# Patient Record
Sex: MALE | Race: WHITE | Employment: OTHER | URBAN - NONMETROPOLITAN AREA
[De-identification: names, ages, dates, MRNs, and addresses within clinical notes are randomized per-mention and may not be internally consistent; named-entity substitution may affect disease eponyms.]

---

## 2017-01-31 DIAGNOSIS — E10.9 TYPE 1 DIABETES, HBA1C GOAL < 7% (H): Primary | ICD-10-CM

## 2017-02-01 RX ORDER — INSULIN ASPART 100 [IU]/ML
INJECTION, SOLUTION INTRAVENOUS; SUBCUTANEOUS
Qty: 9 ML | Refills: 0 | Status: SHIPPED | OUTPATIENT
Start: 2017-02-01 | End: 2017-07-24

## 2017-02-28 DIAGNOSIS — M79.89 SWELLING OF LIMB: ICD-10-CM

## 2017-02-28 DIAGNOSIS — E78.00 HYPERCHOLESTEROLEMIA: ICD-10-CM

## 2017-02-28 RX ORDER — SIMVASTATIN 10 MG
TABLET ORAL
Qty: 90 TABLET | Refills: 1 | Status: SHIPPED | OUTPATIENT
Start: 2017-02-28 | End: 2017-09-19

## 2017-02-28 RX ORDER — CELECOXIB 200 MG/1
CAPSULE ORAL
Qty: 180 CAPSULE | Refills: 1 | Status: SHIPPED | OUTPATIENT
Start: 2017-02-28 | End: 2017-03-20

## 2017-03-04 ENCOUNTER — TRANSFERRED RECORDS (OUTPATIENT)
Dept: HEALTH INFORMATION MANAGEMENT | Facility: HOSPITAL | Age: 82
End: 2017-03-04

## 2017-03-04 ENCOUNTER — HOSPITAL ENCOUNTER (EMERGENCY)
Facility: HOSPITAL | Age: 82
Discharge: ANOTHER HOSPITAL | End: 2017-03-04
Attending: PHYSICIAN ASSISTANT | Admitting: PHYSICIAN ASSISTANT
Payer: MEDICARE

## 2017-03-04 VITALS
HEART RATE: 95 BPM | DIASTOLIC BLOOD PRESSURE: 62 MMHG | SYSTOLIC BLOOD PRESSURE: 98 MMHG | OXYGEN SATURATION: 96 % | RESPIRATION RATE: 20 BRPM | TEMPERATURE: 98 F

## 2017-03-04 DIAGNOSIS — K92.2 GASTROINTESTINAL HEMORRHAGE, UNSPECIFIED GASTROINTESTINAL HEMORRHAGE TYPE: ICD-10-CM

## 2017-03-04 LAB
ABO + RH BLD: NORMAL
ABO + RH BLD: NORMAL
ANION GAP SERPL CALCULATED.3IONS-SCNC: 10 MMOL/L (ref 3–14)
APTT PPP: 24 SEC (ref 24–37)
BASOPHILS # BLD AUTO: 0 10E9/L (ref 0–0.2)
BASOPHILS NFR BLD AUTO: 0.4 %
BLD GP AB SCN SERPL QL: NORMAL
BLOOD BANK CMNT PATIENT-IMP: NORMAL
BUN SERPL-MCNC: 36 MG/DL (ref 7–30)
CALCIUM SERPL-MCNC: 8.8 MG/DL (ref 8.5–10.1)
CHLORIDE SERPL-SCNC: 106 MMOL/L (ref 94–109)
CO2 SERPL-SCNC: 27 MMOL/L (ref 20–32)
CREAT SERPL-MCNC: 1.72 MG/DL (ref 0.66–1.25)
DIFFERENTIAL METHOD BLD: ABNORMAL
EOSINOPHIL # BLD AUTO: 0.1 10E9/L (ref 0–0.7)
EOSINOPHIL NFR BLD AUTO: 1.8 %
ERYTHROCYTE [DISTWIDTH] IN BLOOD BY AUTOMATED COUNT: 13.5 % (ref 10–15)
GFR SERPL CREATININE-BSD FRML MDRD: 38 ML/MIN/1.7M2
GLUCOSE SERPL-MCNC: 180 MG/DL (ref 70–99)
HCT VFR BLD AUTO: 33.6 % (ref 40–53)
HGB BLD-MCNC: 11 G/DL (ref 13.3–17.7)
IMM GRANULOCYTES # BLD: 0.1 10E9/L (ref 0–0.4)
IMM GRANULOCYTES NFR BLD: 0.8 %
INR PPP: 1.12 (ref 0.8–1.2)
LYMPHOCYTES # BLD AUTO: 1.1 10E9/L (ref 0.8–5.3)
LYMPHOCYTES NFR BLD AUTO: 15.5 %
MCH RBC QN AUTO: 31.6 PG (ref 26.5–33)
MCHC RBC AUTO-ENTMCNC: 32.7 G/DL (ref 31.5–36.5)
MCV RBC AUTO: 97 FL (ref 78–100)
MONOCYTES # BLD AUTO: 0.6 10E9/L (ref 0–1.3)
MONOCYTES NFR BLD AUTO: 8.4 %
NEUTROPHILS # BLD AUTO: 5.3 10E9/L (ref 1.6–8.3)
NEUTROPHILS NFR BLD AUTO: 73.1 %
NRBC # BLD AUTO: 0 10*3/UL
NRBC BLD AUTO-RTO: 0 /100
PLATELET # BLD AUTO: 145 10E9/L (ref 150–450)
POTASSIUM SERPL-SCNC: 4.2 MMOL/L (ref 3.4–5.3)
RBC # BLD AUTO: 3.48 10E12/L (ref 4.4–5.9)
SODIUM SERPL-SCNC: 143 MMOL/L (ref 133–144)
SPECIMEN EXP DATE BLD: NORMAL
WBC # BLD AUTO: 7.2 10E9/L (ref 4–11)

## 2017-03-04 PROCEDURE — 86850 RBC ANTIBODY SCREEN: CPT | Performed by: PHYSICIAN ASSISTANT

## 2017-03-04 PROCEDURE — 86901 BLOOD TYPING SEROLOGIC RH(D): CPT | Performed by: PHYSICIAN ASSISTANT

## 2017-03-04 PROCEDURE — 80048 BASIC METABOLIC PNL TOTAL CA: CPT | Performed by: PHYSICIAN ASSISTANT

## 2017-03-04 PROCEDURE — 86900 BLOOD TYPING SEROLOGIC ABO: CPT | Performed by: PHYSICIAN ASSISTANT

## 2017-03-04 PROCEDURE — 93005 ELECTROCARDIOGRAM TRACING: CPT

## 2017-03-04 PROCEDURE — 25000128 H RX IP 250 OP 636: Performed by: PHYSICIAN ASSISTANT

## 2017-03-04 PROCEDURE — 99284 EMERGENCY DEPT VISIT MOD MDM: CPT | Performed by: PHYSICIAN ASSISTANT

## 2017-03-04 PROCEDURE — 99285 EMERGENCY DEPT VISIT HI MDM: CPT | Mod: 25

## 2017-03-04 PROCEDURE — 93010 ELECTROCARDIOGRAM REPORT: CPT | Performed by: INTERNAL MEDICINE

## 2017-03-04 PROCEDURE — 85730 THROMBOPLASTIN TIME PARTIAL: CPT | Performed by: PHYSICIAN ASSISTANT

## 2017-03-04 PROCEDURE — 96361 HYDRATE IV INFUSION ADD-ON: CPT

## 2017-03-04 PROCEDURE — 85025 COMPLETE CBC W/AUTO DIFF WBC: CPT | Performed by: PHYSICIAN ASSISTANT

## 2017-03-04 PROCEDURE — 96374 THER/PROPH/DIAG INJ IV PUSH: CPT

## 2017-03-04 PROCEDURE — 36415 COLL VENOUS BLD VENIPUNCTURE: CPT | Performed by: PHYSICIAN ASSISTANT

## 2017-03-04 PROCEDURE — 85610 PROTHROMBIN TIME: CPT | Performed by: PHYSICIAN ASSISTANT

## 2017-03-04 PROCEDURE — 25000125 ZZHC RX 250: Performed by: PHYSICIAN ASSISTANT

## 2017-03-04 RX ORDER — SODIUM CHLORIDE 9 MG/ML
INJECTION, SOLUTION INTRAVENOUS CONTINUOUS
Status: DISCONTINUED | OUTPATIENT
Start: 2017-03-04 | End: 2017-03-04 | Stop reason: HOSPADM

## 2017-03-04 RX ORDER — SODIUM CHLORIDE 9 MG/ML
1000 INJECTION, SOLUTION INTRAVENOUS CONTINUOUS
Status: DISCONTINUED | OUTPATIENT
Start: 2017-03-04 | End: 2017-03-04 | Stop reason: HOSPADM

## 2017-03-04 RX ADMIN — PANTOPRAZOLE SODIUM 40 MG: 40 INJECTION, POWDER, FOR SOLUTION INTRAVENOUS at 11:58

## 2017-03-04 RX ADMIN — SODIUM CHLORIDE 1000 ML: 9 INJECTION, SOLUTION INTRAVENOUS at 12:06

## 2017-03-04 RX ADMIN — SODIUM CHLORIDE: 9 INJECTION, SOLUTION INTRAVENOUS at 11:05

## 2017-03-04 RX ADMIN — SODIUM CHLORIDE: 9 INJECTION, SOLUTION INTRAVENOUS at 13:09

## 2017-03-04 ASSESSMENT — ENCOUNTER SYMPTOMS
SHORTNESS OF BREATH: 0
LIGHT-HEADEDNESS: 1
WEAKNESS: 0
NAUSEA: 0
DIAPHORESIS: 0
APPETITE CHANGE: 0
PALPITATIONS: 0
ACTIVITY CHANGE: 0
BRUISES/BLEEDS EASILY: 0
ABDOMINAL PAIN: 0
DIZZINESS: 1
VOMITING: 0

## 2017-03-04 NOTE — ED NOTES
Attempting to get pt's blood pressure; as he is laying on his right side, and keeps moving his arm regardless of telling him to keep still. Blood pressure attempting to be obtained left upper arm with large cuff, arm is upright position.

## 2017-03-04 NOTE — ED PROVIDER NOTES
History     Chief Complaint   Patient presents with     Rectal Bleeding     noted blood in stool after going to the      The history is provided by the patient.     Trey Washington is a 81 year old male who presented to the ED via EMS for evaluation of rectal bleeding.  He tells me that this morning he had a BM that was pure blood.  He felt dizzy and weak so 911 was activated.  No blood thinners other than low dose ASA.  No abdominal pain.  No chest pain.  No unusual dyspnea.  No vomiting.     Past Medical History   Diagnosis Date     Chronic kidney disease, stage III (moderate)      Obesity, unspecified      Obstructive sleep apnea (adult) (pediatric)      Osteoarthrosis, unspecified whether generalized or localized, lower leg      Other abnormal blood chemistry      hyperuricemia     Other B-complex deficiencies      Other choreas      Hemiballism     Pure hypercholesterolemia      Skin ulcer (H)      Type II or unspecified type diabetes mellitus without mention of complication, not stated as uncontrolled      Unspecified essential hypertension       Past Surgical History   Procedure Laterality Date     Genitourinary surgery       removal of kidney stones     Orthopedic surgery       knee arthroscopy     Orthopedic surgery  2003     bilateral knee replacement     Orthopedic surgery       right knee arthroscopy     Tonsillectomy & adenoidectomy  1942     Ent surgery       lanced abcess in left ear     Phacoemulsification with standard intraocular lens implant  2/11/2014     Procedure: PHACOEMULSIFICATION WITH STANDARD INTRAOCULAR LENS IMPLANT;  CATARACT EXTRACTION WITH INTRA OCULAR LENS RIGHT;  Surgeon: Luis James MD;  Location: [facility]     Phacoemulsification with standard intraocular lens implant  3/11/2014     Procedure: PHACOEMULSIFICATION WITH STANDARD INTRAOCULAR LENS IMPLANT;  CATARACT EXTRACTION WITH INTRA OCULAR LENS LEFT  ;  Surgeon: Luis James MD;  Location: [facility] OR     Colonoscopy         Social History     Social History     Marital status:      Spouse name: N/A     Number of children: N/A     Years of education: N/A     Occupational History           retired     Social History Main Topics     Smoking status: Former Smoker     Years: 10.00     Types: Cigars     Quit date: 9/29/1987     Smokeless tobacco: Never Used     Alcohol use No     Drug use: No     Sexual activity: No      Comment:      Other Topics Concern     Parent/Sibling W/ Cabg, Mi Or Angioplasty Before 65f 55m? No      Service Yes     [employer]     Blood Transfusions Yes     Permits if needed     Seat Belt Yes     Social History Narrative      Family History   Problem Relation Age of Onset     C.A.D. Father      MI     Other - See Comments Father      shell shocked in the war     DIABETES Paternal Grandmother      I have reviewed the Medications, Allergies, Past Medical and Surgical History, and Social History in the Epic system.    Review of Systems   Constitutional: Negative for activity change, appetite change and diaphoresis.   Respiratory: Negative for shortness of breath.    Cardiovascular: Negative for chest pain and palpitations.   Gastrointestinal: Negative for abdominal pain, nausea and vomiting.   Genitourinary: Negative.    Skin: Negative.    Neurological: Positive for dizziness and light-headedness. Negative for weakness.        Resolved    Hematological: Does not bruise/bleed easily.       Physical Exam   BP: 96/79  Pulse: 95  Heart Rate: 78  Temp: 98.1  F (36.7  C)  Resp: (!) 28  SpO2: 93 %  Physical Exam   Constitutional: He is oriented to person, place, and time. He appears well-developed and well-nourished. No distress.   Pleasant and talkative    Cardiovascular: Normal rate and regular rhythm.    Pulmonary/Chest: Effort normal and breath sounds normal.   Abdominal: Soft. There is no tenderness. There is no guarding.   Obese    Genitourinary:   Genitourinary Comments: Kendell blood at  rectum    Neurological: He is alert and oriented to person, place, and time.   Skin: Skin is warm and dry.   Psychiatric: He has a normal mood and affect.   Nursing note and vitals reviewed.      ED Course     ED Course     Procedures        Medications   0.9% sodium chloride infusion ( Intravenous Stopped 3/4/17 1204)   0.9% sodium chloride BOLUS (1,000 mLs Intravenous New Bag 3/4/17 1206)     Followed by   0.9% sodium chloride infusion (not administered)   0.9% sodium chloride infusion (not administered)   pantoprazole (PROTONIX) 40 mg IV push injection (40 mg Intravenous Given 3/4/17 1158)     Results for orders placed or performed during the hospital encounter of 03/04/17 (from the past 24 hour(s))   CBC with platelets differential   Result Value Ref Range    WBC 7.2 4.0 - 11.0 10e9/L    RBC Count 3.48 (L) 4.4 - 5.9 10e12/L    Hemoglobin 11.0 (L) 13.3 - 17.7 g/dL    Hematocrit 33.6 (L) 40.0 - 53.0 %    MCV 97 78 - 100 fl    MCH 31.6 26.5 - 33.0 pg    MCHC 32.7 31.5 - 36.5 g/dL    RDW 13.5 10.0 - 15.0 %    Platelet Count 145 (L) 150 - 450 10e9/L    Diff Method Automated Method     % Neutrophils 73.1 %    % Lymphocytes 15.5 %    % Monocytes 8.4 %    % Eosinophils 1.8 %    % Basophils 0.4 %    % Immature Granulocytes 0.8 %    Nucleated RBCs 0 0 /100    Absolute Neutrophil 5.3 1.6 - 8.3 10e9/L    Absolute Lymphocytes 1.1 0.8 - 5.3 10e9/L    Absolute Monocytes 0.6 0.0 - 1.3 10e9/L    Absolute Eosinophils 0.1 0.0 - 0.7 10e9/L    Absolute Basophils 0.0 0.0 - 0.2 10e9/L    Abs Immature Granulocytes 0.1 0 - 0.4 10e9/L    Absolute Nucleated RBC 0.0    Basic metabolic panel   Result Value Ref Range    Sodium 143 133 - 144 mmol/L    Potassium 4.2 3.4 - 5.3 mmol/L    Chloride 106 94 - 109 mmol/L    Carbon Dioxide 27 20 - 32 mmol/L    Anion Gap 10 3 - 14 mmol/L    Glucose 180 (H) 70 - 99 mg/dL    Urea Nitrogen 36 (H) 7 - 30 mg/dL    Creatinine 1.72 (H) 0.66 - 1.25 mg/dL    GFR Estimate 38 (L) >60 mL/min/1.7m2    GFR Estimate  If Black 46 (L) >60 mL/min/1.7m2    Calcium 8.8 8.5 - 10.1 mg/dL   ABO/Rh type and screen   Result Value Ref Range    ABO O     RH(D)  Pos     Antibody Screen Neg     Test Valid Only At [facility]     Specimen Expires 03/07/2017    INR   Result Value Ref Range    INR 1.12 0.80 - 1.20   Partial thromboplastin time   Result Value Ref Range    PTT 24 24.00 - 37.00 sec        Critical Care time:  none               Labs Ordered and Resulted from Time of ED Arrival Up to the Time of Departure from the ED   CBC WITH PLATELETS DIFFERENTIAL - Abnormal; Notable for the following:        Result Value    RBC Count 3.48 (*)     Hemoglobin 11.0 (*)     Hematocrit 33.6 (*)     Platelet Count 145 (*)     All other components within normal limits   BASIC METABOLIC PANEL - Abnormal; Notable for the following:     Glucose 180 (*)     Urea Nitrogen 36 (*)     Creatinine 1.72 (*)     GFR Estimate 38 (*)     GFR Estimate If Black 46 (*)     All other components within normal limits   INR   PARTIAL THROMBOPLASTIN TIME   ABO/RH TYPE AND SCREEN       Assessments & Plan (with Medical Decision Making)   BP soft on arrival.  Responded nicely to fluid.  No further bleeding in the ED.  Asymptomatic.  Discussed with hospitalist service who apparently consulted surgery and they do not believe that he is appropriate for [facility].  Discussed with Dr. Ambrose at [facility], who graciously accepted his care for observation.  Transfer in stable condition, via ground EMS.    I have reviewed the nursing notes.    I have reviewed the findings, diagnosis, plan and need for follow up with the patient.    New Prescriptions    No medications on file       Final diagnoses:   Gastrointestinal hemorrhage, unspecified gastrointestinal hemorrhage type       3/4/2017   [facility]     Meek Stringer PA-C  03/04/17 6111

## 2017-03-04 NOTE — ED NOTES
MD at bedside. Assessing vital signs, pt status.  Pt's home care aid providing a picture of pt's blood in the toilet, which is bright red.  Pt had a bowel movement this morning between 5830-8329. That was bright red in color, large amounts. No nausea/vomiting or abdominal pain. Pt reports no chest pains, and not recent illness

## 2017-03-04 NOTE — ED NOTES
Pt laid supine, reported feeling dizzy, blood pressure noted on monitor to be 67/45. HR 90. IV started and labs drawn

## 2017-03-05 ENCOUNTER — TRANSFERRED RECORDS (OUTPATIENT)
Dept: HEALTH INFORMATION MANAGEMENT | Facility: HOSPITAL | Age: 82
End: 2017-03-05

## 2017-03-11 ENCOUNTER — HOSPITAL ENCOUNTER (EMERGENCY)
Facility: HOSPITAL | Age: 82
Discharge: ANOTHER HOSPITAL | End: 2017-03-11
Payer: MEDICARE

## 2017-03-11 VITALS
RESPIRATION RATE: 23 BRPM | DIASTOLIC BLOOD PRESSURE: 106 MMHG | HEART RATE: 78 BPM | SYSTOLIC BLOOD PRESSURE: 166 MMHG | OXYGEN SATURATION: 94 % | TEMPERATURE: 98.5 F

## 2017-03-11 DIAGNOSIS — D64.9 SYMPTOMATIC ANEMIA: ICD-10-CM

## 2017-03-11 DIAGNOSIS — E87.20 LACTIC ACIDOSIS: ICD-10-CM

## 2017-03-11 DIAGNOSIS — R55 SYNCOPE, NEAR: ICD-10-CM

## 2017-03-11 LAB
ALBUMIN SERPL-MCNC: 3 G/DL (ref 3.4–5)
ALBUMIN UR-MCNC: 100 MG/DL
ALP SERPL-CCNC: 41 U/L (ref 40–150)
ALT SERPL W P-5'-P-CCNC: 12 U/L (ref 0–70)
AMMONIA PLAS-SCNC: 15 UMOL/L (ref 10–50)
ANION GAP SERPL CALCULATED.3IONS-SCNC: 9 MMOL/L (ref 3–14)
APPEARANCE UR: ABNORMAL
AST SERPL W P-5'-P-CCNC: 14 U/L (ref 0–45)
BACTERIA #/AREA URNS HPF: ABNORMAL /HPF
BASOPHILS # BLD AUTO: 0 10E9/L (ref 0–0.2)
BASOPHILS NFR BLD AUTO: 0.3 %
BILIRUB SERPL-MCNC: 0.5 MG/DL (ref 0.2–1.3)
BILIRUB UR QL STRIP: NEGATIVE
BUN SERPL-MCNC: 21 MG/DL (ref 7–30)
CALCIUM SERPL-MCNC: 8.8 MG/DL (ref 8.5–10.1)
CHLORIDE SERPL-SCNC: 106 MMOL/L (ref 94–109)
CO2 SERPL-SCNC: 29 MMOL/L (ref 20–32)
COLOR UR AUTO: YELLOW
CREAT SERPL-MCNC: 1.88 MG/DL (ref 0.66–1.25)
DIFFERENTIAL METHOD BLD: ABNORMAL
EOSINOPHIL # BLD AUTO: 0.2 10E9/L (ref 0–0.7)
EOSINOPHIL NFR BLD AUTO: 1.5 %
ERYTHROCYTE [DISTWIDTH] IN BLOOD BY AUTOMATED COUNT: 15.2 % (ref 10–15)
GFR SERPL CREATININE-BSD FRML MDRD: 35 ML/MIN/1.7M2
GLUCOSE SERPL-MCNC: 170 MG/DL (ref 70–99)
GLUCOSE UR STRIP-MCNC: NEGATIVE MG/DL
HCT VFR BLD AUTO: 27.6 % (ref 40–53)
HEMOCCULT SP1 STL QL: NEGATIVE
HGB BLD-MCNC: 8.9 G/DL (ref 13.3–17.7)
HGB UR QL STRIP: NEGATIVE
HYALINE CASTS #/AREA URNS LPF: 7 /LPF (ref 0–2)
IMM GRANULOCYTES # BLD: 0.1 10E9/L (ref 0–0.4)
IMM GRANULOCYTES NFR BLD: 0.7 %
KETONES UR STRIP-MCNC: 5 MG/DL
LACTATE SERPL-SCNC: 2.9 MMOL/L (ref 0.4–2)
LEUKOCYTE ESTERASE UR QL STRIP: ABNORMAL
LYMPHOCYTES # BLD AUTO: 1 10E9/L (ref 0.8–5.3)
LYMPHOCYTES NFR BLD AUTO: 10.4 %
MCH RBC QN AUTO: 31.4 PG (ref 26.5–33)
MCHC RBC AUTO-ENTMCNC: 32.2 G/DL (ref 31.5–36.5)
MCV RBC AUTO: 98 FL (ref 78–100)
MONOCYTES # BLD AUTO: 0.9 10E9/L (ref 0–1.3)
MONOCYTES NFR BLD AUTO: 9 %
MUCOUS THREADS #/AREA URNS LPF: PRESENT /LPF
NEUTROPHILS # BLD AUTO: 7.8 10E9/L (ref 1.6–8.3)
NEUTROPHILS NFR BLD AUTO: 78.1 %
NITRATE UR QL: NEGATIVE
NRBC # BLD AUTO: 0 10*3/UL
NRBC BLD AUTO-RTO: 0 /100
PH UR STRIP: 5.5 PH (ref 4.7–8)
PLATELET # BLD AUTO: 178 10E9/L (ref 150–450)
POTASSIUM SERPL-SCNC: 4.1 MMOL/L (ref 3.4–5.3)
PROT SERPL-MCNC: 6.6 G/DL (ref 6.8–8.8)
RBC # BLD AUTO: 2.83 10E12/L (ref 4.4–5.9)
RBC #/AREA URNS AUTO: 1 /HPF (ref 0–2)
SODIUM SERPL-SCNC: 144 MMOL/L (ref 133–144)
SP GR UR STRIP: 1.01 (ref 1–1.03)
TROPONIN I SERPL-MCNC: 0.03 UG/L (ref 0–0.04)
TSH SERPL DL<=0.05 MIU/L-ACNC: 1.93 MU/L (ref 0.4–4)
URN SPEC COLLECT METH UR: ABNORMAL
UROBILINOGEN UR STRIP-MCNC: NORMAL MG/DL (ref 0–2)
WBC # BLD AUTO: 9.9 10E9/L (ref 4–11)
WBC #/AREA URNS AUTO: 8 /HPF (ref 0–2)

## 2017-03-11 PROCEDURE — 99285 EMERGENCY DEPT VISIT HI MDM: CPT | Mod: 25

## 2017-03-11 PROCEDURE — 84443 ASSAY THYROID STIM HORMONE: CPT

## 2017-03-11 PROCEDURE — 82274 ASSAY TEST FOR BLOOD FECAL: CPT

## 2017-03-11 PROCEDURE — 93010 ELECTROCARDIOGRAM REPORT: CPT | Performed by: INTERNAL MEDICINE

## 2017-03-11 PROCEDURE — 85025 COMPLETE CBC W/AUTO DIFF WBC: CPT

## 2017-03-11 PROCEDURE — 93005 ELECTROCARDIOGRAM TRACING: CPT

## 2017-03-11 PROCEDURE — 36415 COLL VENOUS BLD VENIPUNCTURE: CPT

## 2017-03-11 PROCEDURE — 84484 ASSAY OF TROPONIN QUANT: CPT

## 2017-03-11 PROCEDURE — 70450 CT HEAD/BRAIN W/O DYE: CPT | Mod: TC

## 2017-03-11 PROCEDURE — 82140 ASSAY OF AMMONIA: CPT

## 2017-03-11 PROCEDURE — 83605 ASSAY OF LACTIC ACID: CPT

## 2017-03-11 PROCEDURE — 80053 COMPREHEN METABOLIC PANEL: CPT

## 2017-03-11 PROCEDURE — 99285 EMERGENCY DEPT VISIT HI MDM: CPT

## 2017-03-11 PROCEDURE — 81001 URINALYSIS AUTO W/SCOPE: CPT

## 2017-03-11 ASSESSMENT — ENCOUNTER SYMPTOMS
FEVER: 0
SHORTNESS OF BREATH: 1
ACTIVITY CHANGE: 1
COLOR CHANGE: 0
HEADACHES: 0
EYE REDNESS: 0
MYALGIAS: 1
ARTHRALGIAS: 1
WEAKNESS: 1
FATIGUE: 1
CONFUSION: 0
APPETITE CHANGE: 1
ABDOMINAL PAIN: 0
NECK STIFFNESS: 0
DIFFICULTY URINATING: 0

## 2017-03-12 ENCOUNTER — TRANSFERRED RECORDS (OUTPATIENT)
Dept: HEALTH INFORMATION MANAGEMENT | Facility: HOSPITAL | Age: 82
End: 2017-03-12

## 2017-03-12 NOTE — PROGRESS NOTES
CT Head results routed to Dr Desai, IMPRESSION:  1.  NO EVIDENCE OF ACUTE INTRACRANIAL ABNORMALITY.    2.  MODERATE GENERALIZED ATROPHY.    3.  WHITE-MATTER CHANGES SUGGEST SMALL-VESSEL DISEASE.    4.  RIGHT MAXILLARY SINUS MUCOUS RETENTION CYST.  Pt transferred to [facility] 3/11/17.

## 2017-03-12 NOTE — ED NOTES
Patient incontinent of stool and urine. Stool loose and semi-formed, medium brown colored. Patient awaiting ambulance transfer. Caregiver family still present at the bedside. Remains alert, oriented, and no further episodes of confusion, speech difficulty or altered mental status. VSS.

## 2017-03-12 NOTE — ED NOTES
Patient left via [facility] to [facility] in [place]. VSS. Family caregivers present at departure.

## 2017-03-12 NOTE — ED NOTES
Patient and caregiver family Adri and Itz updated on the patient's status and recommendation for transfer to [place] for services not available here. Bed status received from [facility] with accepting physician Dr. Gilman.

## 2017-03-12 NOTE — ED NOTES
Stool sample obtained rectally, stool medium brown color and loose. No irritation to the rectal area.

## 2017-03-12 NOTE — ED NOTES
Patient's caregiver Maria Eugenia here. Lab draw in progress. Patient repositioned on his right side for comfort, 4 staff assist.

## 2017-03-12 NOTE — ED NOTES
Patient was discharged from [facility] in [place] yesterday, hospitalized for about a week with lower GI bleed. Episode of confusion last night and today prior to arrival. Patient now reports he is having trouble getting his words out. Negative FAST exam. GCS 15.

## 2017-03-12 NOTE — ED PROVIDER NOTES
History     Chief Complaint   Patient presents with     Altered Mental Status     had a 3 minute episode of confusion-pt did not know who he was or where he was but was aware of incident. similar episode last night. d/c yest from [facility] for GI bleed. recently stopped celebrex and ASA      HPI  Trey Washington is a 81 year old male, IDDM, morbid obesity, discharged yesterday from [facility] with lower GI bleed, internal hemorrhoids presents to ED via EMS for evaluation of altered mental status. PCA reports a 3 minute episode of confusion, altered mental status, couldn't remember his dog/PCA, where he was. Similar episode yesterday. Pt denies confusion at present time, does report to right arm paresthesia, no change in vision or hearing. States he feels weak, denies unilateral weakness. No fever, no cp or sob. Has been taking mediations as prescribed. Reports to nausea today, although has been eating normal meals. Normal BM yesterday. Denies active rectal bleeding.     Per discharge summary Dr. Larkin 3/10: Trey Washington is a 81 year old male Admitted on transfer from [place] ED with painless rectal bleeding and tachycardia. He was stabilized by IV fluid administration. He underwent EGD followed by colonoscopy which did find a small non-bleeding polyp at 30 Cm. No bleeding source was found. He re-developer rectal bleeding on 3/8. Sigmoidoscopy then was also unrevealing as to a bleeding source. 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 He tolerated dietary advancement with no subsequent bleeding. His Hgb on 3/10 was stable at 9.9. The slight increase in creatinine is presumably due to his GI bleeding. He maintained acceptable glycemic control during this hospitalization. Aspirin and celebrex (both taken regularly prior to admission) will be held for at least the next week - until cleared to resume by his primary physician. I talked with him and his caregiver regarding expectations of bowel function after  discharge. Should vigorous bleeding resume, He will need angiographic evaluation for embolization (probably of the inferior mesenteric artery).       I have reviewed the Medications, Allergies, Past Medical and Surgical History, and Social History in the Epic system.    Review of Systems   Constitutional: Positive for activity change, appetite change and fatigue. Negative for fever.   HENT: Negative for congestion.    Eyes: Negative for redness.   Respiratory: Positive for shortness of breath.    Cardiovascular: Negative for chest pain.   Gastrointestinal: Negative for abdominal pain.   Genitourinary: Negative for difficulty urinating.   Musculoskeletal: Positive for arthralgias and myalgias. Negative for neck stiffness.   Skin: Negative for color change.   Neurological: Positive for weakness. Negative for headaches.        Episode of altered mental status; right arm paresthesia.    Psychiatric/Behavioral: Negative for confusion.       Physical Exam   BP: 119/73  Pulse: 78  Temp: 98.5  F (36.9  C)  Resp: 20  SpO2: 97 %  Physical Exam   Constitutional: He is oriented to person, place, and time. No distress.   Morbidly obese   HENT:   Head: Normocephalic and atraumatic.   Mouth/Throat: No oropharyngeal exudate.   Eyes: Conjunctivae and EOM are normal. Pupils are equal, round, and reactive to light. No scleral icterus.   Neck: Normal range of motion. Neck supple. No thyromegaly present.   Cardiovascular: Normal rate and regular rhythm.    Pulmonary/Chest: Effort normal and breath sounds normal. No respiratory distress.   Abdominal: Soft. Bowel sounds are normal. There is no tenderness.   Genitourinary: Rectal exam shows guaiac negative stool.   Musculoskeletal: Normal range of motion.   Neurological: He is alert and oriented to person, place, and time. He has normal strength. No cranial nerve deficit. GCS eye subscore is 4. GCS verbal subscore is 5. GCS motor subscore is 6.   Mild sensory deficit right arm, full rom to  fingers, full hand grasp   Skin: Skin is warm and dry. He is not diaphoretic.   Nursing note and vitals reviewed.      ED Course     Procedures             EKG Interpretation:      Interpreted by Emerita Salinas  Time reviewed: 1900  Symptoms at time of EKG: syncope   Rhythm: 1 degree AV block  Rate: 75  Axis: Normal  Ectopy: none  Conduction: normal  ST Segments/ T Waves: Non-specific ST-T wave changes  Q Waves: none  Comparison to prior: Unchanged    Clinical Impression: no acute changes    Labs Ordered and Resulted from Time of ED Arrival Up to the Time of Departure from the ED   UA MACROSCOPIC WITH REFLEX TO MICRO AND CULTURE - Abnormal; Notable for the following:        Result Value    Ketones Urine 5 (*)     Protein Albumin Urine 100 (*)     Leukocyte Esterase Urine Small (*)     WBC Urine 8 (*)     Bacteria Urine Few (*)     Mucous Urine Present (*)     Hyaline Casts 7 (*)     All other components within normal limits   CBC WITH PLATELETS DIFFERENTIAL - Abnormal; Notable for the following:     RBC Count 2.83 (*)     Hemoglobin 8.9 (*)     Hematocrit 27.6 (*)     RDW 15.2 (*)     All other components within normal limits   COMPREHENSIVE METABOLIC PANEL - Abnormal; Notable for the following:     Glucose 170 (*)     Creatinine 1.88 (*)     GFR Estimate 35 (*)     GFR Estimate If Black 42 (*)     Albumin 3.0 (*)     Protein Total 6.6 (*)     All other components within normal limits   LACTIC ACID - Abnormal; Notable for the following:     Lactic Acid 2.9 (*)     All other components within normal limits   AMMONIA   TROPONIN I   TSH   IMMUNOS OCCULT BLOOD       Assessments & Plan (with Medical Decision Making)   Pt presents with syncopal episode, altered mental status. Discharged from [facility] yesterday with lower GI bleed. VSS, NAD, neuro status as above. CT head shows no acute abnormality. Arm paresthesia likely from laying on it. Labs show Hgb 8.9, 1 gram drop from yesterday. Lactic acid 2.9, glucose 170. Occult  negative.   IV fluids given.   Given syncopal episode, recent discharge with GI bleed, 1 gm drop in Hgb since yesterday, consulted with Dr. Stinson for possible admission/observation. Dr. Stinson recommended transfer to [facility] stating we do not have GI coverage at this facility. Consulted with Dr. Gilman, who felt pt could safely be admitted here for observation, although accepted transfer. Pt agrees with plan.  Pt transferred to [facility] via BLS in stable condition.     I have reviewed the nursing notes.    I have reviewed the findings, diagnosis, plan and need for follow up with the patient.    Current Discharge Medication List          Final diagnoses:   Syncope, near   Symptomatic anemia   Lactic acidosis       3/11/2017   [facility]     Emerita Salinas, ARELY FNP  03/11/17 [id number]

## 2017-03-17 ENCOUNTER — HOSPITAL ENCOUNTER (EMERGENCY)
Facility: HOSPITAL | Age: 82
Discharge: HOME | End: 2017-03-17
Attending: PHYSICIAN ASSISTANT | Admitting: PHYSICIAN ASSISTANT
Payer: MEDICARE

## 2017-03-17 ENCOUNTER — TELEPHONE (OUTPATIENT)
Dept: FAMILY MEDICINE | Facility: OTHER | Age: 82
End: 2017-03-17

## 2017-03-17 VITALS
TEMPERATURE: 98 F | HEART RATE: 61 BPM | DIASTOLIC BLOOD PRESSURE: 102 MMHG | RESPIRATION RATE: 16 BRPM | OXYGEN SATURATION: 100 % | SYSTOLIC BLOOD PRESSURE: 184 MMHG

## 2017-03-17 DIAGNOSIS — N18.30 CKD (CHRONIC KIDNEY DISEASE) STAGE 3, GFR 30-59 ML/MIN (H): ICD-10-CM

## 2017-03-17 DIAGNOSIS — R41.89 SPELL OF ALTERED COGNITION: ICD-10-CM

## 2017-03-17 DIAGNOSIS — D64.9 ANEMIA, UNSPECIFIED TYPE: ICD-10-CM

## 2017-03-17 DIAGNOSIS — N18.30 CHRONIC KIDNEY DISEASE, STAGE III (MODERATE) (H): ICD-10-CM

## 2017-03-17 LAB
ANION GAP SERPL CALCULATED.3IONS-SCNC: 5 MMOL/L (ref 3–14)
BASOPHILS # BLD AUTO: 0 10E9/L (ref 0–0.2)
BASOPHILS NFR BLD AUTO: 0.6 %
BUN SERPL-MCNC: 24 MG/DL (ref 7–30)
CALCIUM SERPL-MCNC: 9.3 MG/DL (ref 8.5–10.1)
CHLORIDE SERPL-SCNC: 104 MMOL/L (ref 94–109)
CO2 SERPL-SCNC: 32 MMOL/L (ref 20–32)
CREAT SERPL-MCNC: 1.73 MG/DL (ref 0.66–1.25)
DIFFERENTIAL METHOD BLD: ABNORMAL
EOSINOPHIL # BLD AUTO: 0.2 10E9/L (ref 0–0.7)
EOSINOPHIL NFR BLD AUTO: 3.4 %
ERYTHROCYTE [DISTWIDTH] IN BLOOD BY AUTOMATED COUNT: 14.6 % (ref 10–15)
GFR SERPL CREATININE-BSD FRML MDRD: 38 ML/MIN/1.7M2
GLUCOSE SERPL-MCNC: 106 MG/DL (ref 70–99)
HCT VFR BLD AUTO: 27.8 % (ref 40–53)
HGB BLD-MCNC: 8.8 G/DL (ref 13.3–17.7)
IMM GRANULOCYTES # BLD: 0 10E9/L (ref 0–0.4)
IMM GRANULOCYTES NFR BLD: 0.8 %
LYMPHOCYTES # BLD AUTO: 1.1 10E9/L (ref 0.8–5.3)
LYMPHOCYTES NFR BLD AUTO: 20.6 %
MCH RBC QN AUTO: 30.8 PG (ref 26.5–33)
MCHC RBC AUTO-ENTMCNC: 31.7 G/DL (ref 31.5–36.5)
MCV RBC AUTO: 97 FL (ref 78–100)
MONOCYTES # BLD AUTO: 0.6 10E9/L (ref 0–1.3)
MONOCYTES NFR BLD AUTO: 11.7 %
NEUTROPHILS # BLD AUTO: 3.3 10E9/L (ref 1.6–8.3)
NEUTROPHILS NFR BLD AUTO: 62.9 %
NRBC # BLD AUTO: 0 10*3/UL
NRBC BLD AUTO-RTO: 0 /100
PLATELET # BLD AUTO: 220 10E9/L (ref 150–450)
POTASSIUM SERPL-SCNC: 4.2 MMOL/L (ref 3.4–5.3)
RBC # BLD AUTO: 2.86 10E12/L (ref 4.4–5.9)
SODIUM SERPL-SCNC: 141 MMOL/L (ref 133–144)
WBC # BLD AUTO: 5.3 10E9/L (ref 4–11)

## 2017-03-17 PROCEDURE — 36415 COLL VENOUS BLD VENIPUNCTURE: CPT | Performed by: PHYSICIAN ASSISTANT

## 2017-03-17 PROCEDURE — 99283 EMERGENCY DEPT VISIT LOW MDM: CPT

## 2017-03-17 PROCEDURE — 99284 EMERGENCY DEPT VISIT MOD MDM: CPT | Performed by: PHYSICIAN ASSISTANT

## 2017-03-17 PROCEDURE — 85025 COMPLETE CBC W/AUTO DIFF WBC: CPT | Performed by: PHYSICIAN ASSISTANT

## 2017-03-17 PROCEDURE — 80048 BASIC METABOLIC PNL TOTAL CA: CPT | Performed by: PHYSICIAN ASSISTANT

## 2017-03-17 ASSESSMENT — ENCOUNTER SYMPTOMS
PHOTOPHOBIA: 0
FEVER: 0
DIZZINESS: 0
VOMITING: 0
ABDOMINAL PAIN: 0
CHILLS: 0
NAUSEA: 0
LIGHT-HEADEDNESS: 0
HEADACHES: 0
SHORTNESS OF BREATH: 0
WEAKNESS: 0
BACK PAIN: 0
SPEECH DIFFICULTY: 1

## 2017-03-17 NOTE — ED NOTES
Pt comes in via [facility] after witnessed seizure at home today at 1300.  Pt has caregiver who cares for him 12 hours a day.  Pt is now alert and oriented.  Pt placed on monitors.

## 2017-03-17 NOTE — ED AVS SNAPSHOT
[facility]    [address]    [facility] [address]    Phone:  [phone]                                       Trey Washington   MRN: [id number]    Department:  [facility]   Date of Visit:  3/17/2017           Patient Information     Date Of Birth          1935        Your diagnoses for this visit were:     Spell of altered cognition     Anemia, unspecified type     CKD (chronic kidney disease) stage 3, GFR 30-59 ml/min     Chronic kidney disease, stage III (moderate)        You were seen by Meek Stringer PA-C.      Follow-up Information     Follow up with Moreno Desai MD.    Specialty:  Family Practice    Contact information:    [facility]  [address] [facility] [address] [phone]          Follow up with [facility].    Specialty:  EMERGENCY MEDICINE    Why:  If symptoms worsen    Contact information:    [address] [address] [phone]    Additional information:    From [place]: Take [address]. Turn left at [address]. Turn left at the first stoplight on [address]. At the first stop sign, take a right onto [address]. Take a left into the parking lot and continue through until you reach the North enterance of the building.       From [place]: Take [address]. Take the [address] ramp towards [place]. Turn left onto [address]. Take a slight right onto [address]. Turn left at the first stoplight on [address]. At the first stop sign, take a right onto [address]. Take a left into the parking lot and continue through until you reach the North enterance of the building.       From [place]: Take [address]. Take a right at [address]. At the first stop sign, take a right onto [address]. Take a left into the parking lot and continue through until you reach the North enterance of the building.         Discharge  Instructions       Please follow-up with Dr. Desai.     Return HERE for any other concerns or questions.     Future Appointments        Provider Department Dept Phone Center    3/20/2017 11:00 AM Moreno Desai MD [facility] [phone] [facility]         Review of your medicines      CONTINUE these medicines which may have CHANGED, or have new prescriptions. If we are uncertain of the size of tablets/capsules you have at home, strength may be listed as something that might have changed.        Dose / Directions Last dose taken    captopril 50 MG tablet   Commonly known as:  CAPOTEN   Dose:  100 mg   What changed:  how much to take   Quantity:  270 tablet        Take 2 tablets (100 mg) by mouth 3 times daily   Refills:  3        * insulin glargine 100 UNIT/ML injection   Commonly known as:  LANTUS SOLOSTAR   What changed:    - how much to take  - how to take this  - when to take this  - additional instructions   Quantity:  15 mL        Inject 18 units subcutaneous at bedtime.   Refills:  6        * LANTUS SOLOSTAR 100 UNIT/ML injection   What changed:  Another medication with the same name was changed. Make sure you understand how and when to take each.   Quantity:  15 mL   Generic drug:  insulin glargine        INJECT 18 UNITS SUBQ EVERY DAY AT BEDTIME   Refills:  2        ketoconazole 2 % cream   Commonly known as:  NIZORAL   What changed:    - when to take this  - reasons to take this   Quantity:  120 g        Apply topically 2 times daily   Refills:  1        * Notice:  This list has 2 medication(s) that are the same as other medications prescribed for you. Read the directions carefully, and ask your doctor or other care provider to review them with you.      Our records show that you are taking the medicines listed below. If these are incorrect, please call your family doctor or clinic.        Dose / Directions Last dose taken    allopurinol 300 MG tablet   Commonly known as:  ZYLOPRIM  "  Quantity:  90 tablet        TAKE 1 TABLET BY MOUTH EVERY DAY   Refills:  0        [facility] EXTRA HYDROF[facility] 4\"X5\" Pads   Dose:  0.25 each   Quantity:  5 each        Externally apply 0.25 each topically every other day Use as directed by wound center   Refills:  3        aspirin 81 MG tablet   Dose:  1 tablet        Take 1 tablet by mouth daily   Refills:  0        blood glucose lancing device   Quantity:  200 each        Use to test blood sugars 2 times daily or as directed.   Refills:  3        blood glucose monitoring meter device kit   Quantity:  1 kit        Use to test blood sugars 2 times daily or as directed.   Refills:  0        blood glucose monitoring test strip   Commonly known as:  KEO CONTOUR   Quantity:  200 each        Use to test blood sugars 2 times daily or as directed.   Refills:  3        calcium carbonate 500 MG tablet   Commonly known as:  OS-MARILIN 500 mg [place]. Ca   Dose:  500 mg   Quantity:  180 tablet        Take 1 tablet (500 mg) by mouth 2 times daily   Refills:  1        celecoxib 200 MG capsule   Commonly known as:  celeBREX   Quantity:  180 capsule        TAKE 1 CAPSULE BY MOUTH TWICE DAILY   Refills:  1        cyanocolbalamin 500 MCG tablet   Commonly known as:  vitamin  B-12   Quantity:  180 tablet        TAKE 2 TABLETS (1,000 MCG) BY MOUTH DAILY   Refills:  1        DOCQLACE 100 MG capsule   Quantity:  100 capsule   Generic drug:  docusate sodium        TAKE 1 CAPSULE BY MOUTH TWICE DAILY   Refills:  3        insulin pen needle 30G X 8 MM   Commonly known as:  NOVOFINE 30   Quantity:  100 each        Use 4 pen needles daily or as directed.   Refills:  6        metFORMIN 1000 MG tablet   Commonly known as:  GLUCOPHAGE   Quantity:  90 tablet        TAKE ONE TABLET BY MOUTH EVERY DAY WITH A MEAL   Refills:  0        metoprolol 25 MG tablet   Commonly known as:  LOPRESSOR   Quantity:  90 tablet        TAKE 1/2 TABLET TWO TIMES A DAY   Refills:  1        miconazole 2 % Aerp powder "   Commonly known as:  MICATIN   Dose:  113 g   Quantity:  1 each        Apply 113 g topically 2 times daily   Refills:  3        NovoLOG FLEXPEN 100 UNIT/ML injection   Quantity:  9 mL   Generic drug:  insulin aspart        INJECT 8 UNITS SUBQ BEFORE BEDTIME IN ADDITION TO SLIDING SCALE   Refills:  0        * order for DME   Quantity:  1 each        Equipment being ordered: Oxygen 2 LPM per nasal cannula during the day, portable also   Refills:  11        * order for DME   Quantity:  1 each        Equipment being ordered: Wheelchair   Refills:  0        * order for DME   Quantity:  1 each        Equipment being ordered: Meplilex Transfer 6 X 8 inch ([id number]) - disp 3;  Aquacel AG 4 X 5 ([id number]); disp 3 - Refills 6   Refills:  6        order for DME   Quantity:  1 Units        4x4 bulk SOFT gauze (disp 1 sleeve, refill x 5)   Refills:  0        Oyster Shell Calcium 500 MG tablet   Commonly known as:  OS-Morris 500 mg [place] Ca   Quantity:  180 tablet        TAKE ONE TABLET BY MOUTH TWO TIMES A DAY   Refills:  3        polyethylene glycol powder   Commonly known as:  MIRALAX/GLYCOLAX   Quantity:  527 g        TAKE 17 GRAMS BY MOUTH DAILY MIXED AS DIRECTED.   Refills:  6        sertraline 100 MG tablet   Commonly known as:  ZOLOFT   Quantity:  90 tablet        TAKE 1 TABLET BY MOUTH ONCE DAILY   Refills:  3        simvastatin 10 MG tablet   Commonly known as:  ZOCOR   Quantity:  90 tablet        TAKE 1 TABLET (10 MG) BY MOUTH AT BEDTIME   Refills:  1        T.E.D. BELOW KNEE/L-REGULAR Misc   Quantity:  6 each        Apply in am and take off in the evening   Refills:  11        terazosin 10 MG capsule   Commonly known as:  HYTRIN   Quantity:  90 capsule        TAKE 1 CAPSULE BY MOUTH NIGHTLY AT BEDTIME   Refills:  2        TYLENOL PO   Dose:  500 mg        Take 500 mg by mouth Takes 2 tablets at HS   Refills:  0        UNABLE TO FIND        CPAP   Refills:  0        UNISTIK 3 NORMAL Misc   Quantity:  100 each        USE TO  TEST BLOOD SUGARS TWO TIMES A DAY   Refills:  1        VITAMIN C PO        Take by mouth daily   Refills:  0        VITAMIN D HIGH POTENCY 1000 UNITS Caps   Quantity:  90 capsule        TAKE 1 CAPSULE BY MOUTH DAILY   Refills:  3        vitamin E 100 UNIT capsule   Commonly known as:  TOCOPHEROL   Dose:  100 Units        Take 100 Units by mouth daily Uncertain of dose   Refills:  0        * Notice:  This list has 3 medication(s) that are the same as other medications prescribed for you. Read the directions carefully, and ask your doctor or other care provider to review them with you.            Procedures and tests performed during your visit     Basic metabolic panel    CBC with platelets differential      Orders Needing Specimen Collection     None      Pending Results     No orders found from 3/15/2017 to 3/18/2017.            Pending Culture Results     No orders found from 3/15/2017 to 3/18/2017.            Thank you for choosing [facility]       Thank you for choosing [facility] for your care. Our goal is always to provide you with excellent care. Hearing back from our patients is one way we can continue to improve our services. Please take a few minutes to complete the written survey that you may receive in the mail after you visit with us. Thank you!        [facility] Information     [facility] gives you secure access to your electronic health record. If you see a primary care provider, you can also send messages to your care team and make appointments. If you have questions, please call your primary care clinic.  If you do not have a primary care provider, please call [phone] and they will assist you.        Care EveryWhere ID     This is your Care EveryWhere ID. This could be used by other organizations to access your [facility] medical records  [id number]        After Visit Summary       This is your record. Keep this with you and show to your community pharmacist(s) and doctor(s) at your next visit.

## 2017-03-17 NOTE — ED PROVIDER NOTES
"  History     Chief Complaint   Patient presents with     Seizures     had witnesed 3-4 minute seizure today at 1300     The history is provided by the patient.     Trey Washington is a 81 year old male who presented to the ED via EMS for evaluation of a possible seizure.  [facility] reports that he had an approx 3-4 minute episode of \"spacing out and slurred speech.\"  Mr. Washington realized when these things happen and the symptoms resolve as fast that they happen.  He was transferred to [place] on 3/11/17 due to near syncope and continued rectal bleeding.  Discharged yesterday. He underwent an extensive work-up for these \"spells.\"  He had a head CT, neck CT, carotid US, and EEG.  He was to [facility] for the MRI machine.  After the work-up, neurology believed that he likely had an underlying seizure disorder. He was started on Keppra.  On his arrival here, he was pleasant and talkative.  No further episodes.  No further rectal bleeding.  He actually had no concerns or questions.     Past Medical History   Diagnosis Date     Chronic kidney disease, stage III (moderate)      Obesity, unspecified      Obstructive sleep apnea (adult) (pediatric)      Osteoarthrosis, unspecified whether generalized or localized, lower leg      Other abnormal blood chemistry      hyperuricemia     Other B-complex deficiencies      Other choreas      Hemiballism     Pure hypercholesterolemia      Skin ulcer (H)      Type II or unspecified type diabetes mellitus without mention of complication, not stated as uncontrolled      Unspecified essential hypertension       Past Surgical History   Procedure Laterality Date     Genitourinary surgery       removal of kidney stones     Orthopedic surgery       knee arthroscopy     Orthopedic surgery  2003     bilateral knee replacement     Orthopedic surgery       right knee arthroscopy     Tonsillectomy & adenoidectomy  1942     Ent surgery       lanced june in left ear     Phacoemulsification with standard " intraocular lens implant  2/11/2014     Procedure: PHACOEMULSIFICATION WITH STANDARD INTRAOCULAR LENS IMPLANT;  CATARACT EXTRACTION WITH INTRA OCULAR LENS RIGHT;  Surgeon: Luis James MD;  Location: [facility]     Phacoemulsification with standard intraocular lens implant  3/11/2014     Procedure: PHACOEMULSIFICATION WITH STANDARD INTRAOCULAR LENS IMPLANT;  CATARACT EXTRACTION WITH INTRA OCULAR LENS LEFT  ;  Surgeon: Luis James MD;  Location: [facility]     Colonoscopy        Social History     Social History     Marital status:      Spouse name: N/A     Number of children: N/A     Years of education: N/A     Occupational History           retired     Social History Main Topics     Smoking status: Former Smoker     Years: 10.00     Types: Cigars     Quit date: 9/29/1987     Smokeless tobacco: Never Used     Alcohol use No     Drug use: No     Sexual activity: No      Comment:      Other Topics Concern     Parent/Sibling W/ Cabg, Mi Or Angioplasty Before 65f 55m? No      Service Yes     [employer]     Blood Transfusions Yes     Permits if needed     Seat Belt Yes     Social History Narrative      Family History   Problem Relation Age of Onset     C.A.D. Father      MI     Other - See Comments Father      shell shocked in the war     DIABETES Paternal Grandmother        I have reviewed the Medications, Allergies, Past Medical and Surgical History, and Social History in the Epic system.    Review of Systems   Constitutional: Negative for chills and fever.   Eyes: Negative for photophobia and visual disturbance.   Respiratory: Negative for shortness of breath.    Cardiovascular: Negative for chest pain.   Gastrointestinal: Negative for abdominal pain, nausea and vomiting.   Genitourinary: Negative.    Musculoskeletal: Negative for back pain.   Neurological: Positive for speech difficulty. Negative for dizziness, weakness, light-headedness and headaches.       Physical Exam   BP:  159/92  Pulse: 66  Temp: 97.4  F (36.3  C)  Resp: 18  SpO2: (!) 91 %  Physical Exam   Constitutional: He is oriented to person, place, and time. He appears well-developed and well-nourished. No distress.   Cardiovascular: Normal rate and regular rhythm.    Pulmonary/Chest: Effort normal and breath sounds normal.   Abdominal: Soft. There is no tenderness. There is no guarding.   Obese    Neurological: He is alert and oriented to person, place, and time.   No focal findings    Skin: Skin is warm and dry.   Psychiatric: He has a normal mood and affect.   Nursing note and vitals reviewed.      ED Course     ED Course     Procedures        Medications - No data to display     Results for orders placed or performed during the hospital encounter of 03/17/17 (from the past 24 hour(s))   CBC with platelets differential   Result Value Ref Range    WBC 5.3 4.0 - 11.0 10e9/L    RBC Count 2.86 (L) 4.4 - 5.9 10e12/L    Hemoglobin 8.8 (L) 13.3 - 17.7 g/dL    Hematocrit 27.8 (L) 40.0 - 53.0 %    MCV 97 78 - 100 fl    MCH 30.8 26.5 - 33.0 pg    MCHC 31.7 31.5 - 36.5 g/dL    RDW 14.6 10.0 - 15.0 %    Platelet Count 220 150 - 450 10e9/L    Diff Method Automated Method     % Neutrophils 62.9 %    % Lymphocytes 20.6 %    % Monocytes 11.7 %    % Eosinophils 3.4 %    % Basophils 0.6 %    % Immature Granulocytes 0.8 %    Nucleated RBCs 0 0 /100    Absolute Neutrophil 3.3 1.6 - 8.3 10e9/L    Absolute Lymphocytes 1.1 0.8 - 5.3 10e9/L    Absolute Monocytes 0.6 0.0 - 1.3 10e9/L    Absolute Eosinophils 0.2 0.0 - 0.7 10e9/L    Absolute Basophils 0.0 0.0 - 0.2 10e9/L    Abs Immature Granulocytes 0.0 0 - 0.4 10e9/L    Absolute Nucleated RBC 0.0    Basic metabolic panel   Result Value Ref Range    Sodium 141 133 - 144 mmol/L    Potassium 4.2 3.4 - 5.3 mmol/L    Chloride 104 94 - 109 mmol/L    Carbon Dioxide 32 20 - 32 mmol/L    Anion Gap 5 3 - 14 mmol/L    Glucose 106 (H) 70 - 99 mg/dL    Urea Nitrogen 24 7 - 30 mg/dL    Creatinine 1.73 (H) 0.66 -  1.25 mg/dL    GFR Estimate 38 (L) >60 mL/min/1.7m2    GFR Estimate If Black 46 (L) >60 mL/min/1.7m2    Calcium 9.3 8.5 - 10.1 mg/dL        Critical Care time:  none               Labs Ordered and Resulted from Time of ED Arrival Up to the Time of Departure from the ED   CBC WITH PLATELETS DIFFERENTIAL - Abnormal; Notable for the following:        Result Value    RBC Count 2.86 (*)     Hemoglobin 8.8 (*)     Hematocrit 27.8 (*)     All other components within normal limits   BASIC METABOLIC PANEL - Abnormal; Notable for the following:     Glucose 106 (*)     Creatinine 1.73 (*)     GFR Estimate 38 (*)     GFR Estimate If Black 46 (*)     All other components within normal limits       Assessments & Plan (with Medical Decision Making)   Ms. Washington was observed in the ED for 2 hours and did well.  He had no spells or seizures.  After reviewing his previous visits at [facility], it appears that they believe he may be having seizures.  He had a CT of his brain and neck, EEG, and carotid US.  He is currently on ASA.  I had a long and detailed discussion with Hari.  He would like to go home.  I believe that this is certainly reasonable.  He has had an extensive work-up at a tertiary center with no actual concrete diagnosis.  This may be from TIAs but neurology believes that it is likely seizures.  He has had multiple rectal bleeds recently so adding plavix or the like would have more risks than benefits. There is nothing we can do in the ED for intermittent absence seizures.  He is currently on Keppra.  His labs are unchanged from baseline.  He is pleasant, talkative, and joking.  No fevers.  No falls.  No reasonable medical indication for repeat imaging or further work-up today.  No indication for admission. This was all discussed with Mr. Washington.  He voiced complete understanding and was agreeable.     I have reviewed the nursing notes.    I have reviewed the findings, diagnosis, plan and need for follow up with the  patient.    New Prescriptions    No medications on file       Final diagnoses:   Spell of altered cognition   Anemia, unspecified type   CKD (chronic kidney disease) stage 3, GFR 30-59 ml/min       3/17/2017   [facility]     Meek Stringer PA-C  03/17/17 1851       Meek Stringer PA-C  03/17/17 1914

## 2017-03-17 NOTE — TELEPHONE ENCOUNTER
I called [facility] and notified she states that they will not do anything for him. They have done this twice. I discussed this with her and she will follow the doctor's advice.

## 2017-03-17 NOTE — PLAN OF CARE
Pt offered meal, declines at this time. Pt caregiver updated via telephone.  Caregiver is able to transfer home when ready.

## 2017-03-17 NOTE — ED AVS SNAPSHOT
[facility]    [address]    [place] [address]    Phone:  [phone]                                       Trey Washington   MRN: [id number]    Department:  [facility]   Date of Visit:  3/17/2017           After Visit Summary Signature Page     I have received my discharge instructions, and my questions have been answered. I have discussed any challenges I see with this plan with the nurse or doctor.    ..........................................................................................................................................  Patient/Patient Representative Signature      ..........................................................................................................................................  Patient Representative Print Name and Relationship to Patient    ..................................................               ................................................  Date                                            Time    ..........................................................................................................................................  Reviewed by Signature/Title    ...................................................              ..............................................  Date                                                            Time

## 2017-03-17 NOTE — TELEPHONE ENCOUNTER
[facility] Eugenia Willett calls pt is out of [facility] and has had another episode today which was longer than the one's he had while in the hospital. He is on Keppra 1,000mg bid. He thinks he was on twice that dose while in the hospital. They cannot get any advice from [facility] what would you recommend?

## 2017-03-18 NOTE — ED NOTES
"Pt caregiver updated on pt discharge.  Caregiver upset that \"nothing is being done\".  Attempted to explain plan of care to caregiver.  Pt states she is home for 24 hours and then she \"has to send them him back in\".    "

## 2017-03-20 ENCOUNTER — OFFICE VISIT (OUTPATIENT)
Dept: FAMILY MEDICINE | Facility: OTHER | Age: 82
End: 2017-03-20
Attending: FAMILY MEDICINE
Payer: MEDICARE

## 2017-03-20 VITALS
RESPIRATION RATE: 20 BRPM | DIASTOLIC BLOOD PRESSURE: 54 MMHG | HEART RATE: 72 BPM | OXYGEN SATURATION: 88 % | TEMPERATURE: 97.3 F | SYSTOLIC BLOOD PRESSURE: 108 MMHG

## 2017-03-20 DIAGNOSIS — Z53.9 ERRONEOUS ENCOUNTER--DISREGARD: Primary | ICD-10-CM

## 2017-03-20 DIAGNOSIS — G40.909 SEIZURE DISORDER (H): ICD-10-CM

## 2017-03-20 DIAGNOSIS — K92.2 GASTROINTESTINAL HEMORRHAGE, UNSPECIFIED GASTROINTESTINAL HEMORRHAGE TYPE: ICD-10-CM

## 2017-03-20 PROBLEM — R55 CONVULSIVE SYNCOPE: Status: ACTIVE | Noted: 2017-03-12

## 2017-03-20 LAB
ANION GAP SERPL CALCULATED.3IONS-SCNC: 11 MMOL/L (ref 3–14)
BASOPHILS # BLD AUTO: 0 10E9/L (ref 0–0.2)
BASOPHILS NFR BLD AUTO: 0.7 %
BUN SERPL-MCNC: 27 MG/DL (ref 7–30)
CALCIUM SERPL-MCNC: 9.2 MG/DL (ref 8.5–10.1)
CHLORIDE SERPL-SCNC: 105 MMOL/L (ref 94–109)
CO2 SERPL-SCNC: 27 MMOL/L (ref 20–32)
CREAT SERPL-MCNC: 1.74 MG/DL (ref 0.66–1.25)
DIFFERENTIAL METHOD BLD: ABNORMAL
EOSINOPHIL # BLD AUTO: 0.2 10E9/L (ref 0–0.7)
EOSINOPHIL NFR BLD AUTO: 4.1 %
ERYTHROCYTE [DISTWIDTH] IN BLOOD BY AUTOMATED COUNT: 14.8 % (ref 10–15)
GFR SERPL CREATININE-BSD FRML MDRD: 38 ML/MIN/1.7M2
GLUCOSE SERPL-MCNC: 139 MG/DL (ref 70–99)
HCT VFR BLD AUTO: 29.3 % (ref 40–53)
HGB BLD-MCNC: 9 G/DL (ref 13.3–17.7)
LYMPHOCYTES # BLD AUTO: 1.2 10E9/L (ref 0.8–5.3)
LYMPHOCYTES NFR BLD AUTO: 19.6 %
MCH RBC QN AUTO: 30.8 PG (ref 26.5–33)
MCHC RBC AUTO-ENTMCNC: 30.7 G/DL (ref 31.5–36.5)
MCV RBC AUTO: 100 FL (ref 78–100)
MONOCYTES # BLD AUTO: 0.6 10E9/L (ref 0–1.3)
MONOCYTES NFR BLD AUTO: 9.7 %
NEUTROPHILS # BLD AUTO: 3.9 10E9/L (ref 1.6–8.3)
NEUTROPHILS NFR BLD AUTO: 65.9 %
PLATELET # BLD AUTO: 221 10E9/L (ref 150–450)
POTASSIUM SERPL-SCNC: 4 MMOL/L (ref 3.4–5.3)
RBC # BLD AUTO: 2.92 10E12/L (ref 4.4–5.9)
SODIUM SERPL-SCNC: 143 MMOL/L (ref 133–144)
WBC # BLD AUTO: 5.9 10E9/L (ref 4–11)

## 2017-03-20 PROCEDURE — 85025 COMPLETE CBC W/AUTO DIFF WBC: CPT | Performed by: FAMILY MEDICINE

## 2017-03-20 PROCEDURE — 99212 OFFICE O/P EST SF 10 MIN: CPT

## 2017-03-20 PROCEDURE — 99214 OFFICE O/P EST MOD 30 MIN: CPT | Performed by: FAMILY MEDICINE

## 2017-03-20 PROCEDURE — 36415 COLL VENOUS BLD VENIPUNCTURE: CPT | Performed by: FAMILY MEDICINE

## 2017-03-20 PROCEDURE — 80048 BASIC METABOLIC PNL TOTAL CA: CPT | Performed by: FAMILY MEDICINE

## 2017-03-20 RX ORDER — LEVETIRACETAM 1000 MG/1
1000 TABLET ORAL 2 TIMES DAILY
COMMUNITY
Start: 2017-03-16 | End: 2017-03-28

## 2017-03-20 RX ORDER — CHLORAL HYDRATE 500 MG
1000 CAPSULE ORAL DAILY
COMMUNITY

## 2017-03-20 RX ORDER — UREA 10 %
220 LOTION (ML) TOPICAL DAILY
COMMUNITY
End: 2017-07-28

## 2017-03-20 ASSESSMENT — PAIN SCALES - GENERAL: PAINLEVEL: NO PAIN (0)

## 2017-03-20 NOTE — NURSING NOTE
"Chief Complaint   Patient presents with     Hospital F/U     Pt is in for a FU after hospitalization. Pt has a 3-4 minutes seizure on 03/17/17 and went to the ER for evaluation. Pt was hospitalized at [facility] for lower GI bleed. Pt has not had an MRI due to weight.  Pt's HGB has decreased from hospital discharge to ER visit. Pt has had no bleeding or abd pain since discharge, He has not had a spell since Friday. Pt was taken off of his Celebrex and arm/shoulder pain has returned. Pt has tingling right arm. This started with the seizures.        continuation     Pt was supposed to be takne off of the Metformin due to kidneys but did not.        Initial /54 (BP Location: Right arm, Patient Position: Chair, Cuff Size: Adult Large)  Pulse 72  Temp 97.3  F (36.3  C) (Tympanic)  Resp 20  SpO2 (!) 88% Estimated body mass index is 50.22 kg/(m^2) as calculated from the following:    Height as of 10/5/16: 5' 10\" (1.778 m).    Weight as of 10/5/16: 350 lb (158.8 kg).  Medication Reconciliation: complete   Krystyna Galindo    "

## 2017-03-20 NOTE — PROGRESS NOTES
Trey Washington    March 20, 2017    Chief Complaint   Patient presents with     Hospital F/U     Pt is in for a FU after hospitalization. Pt has a 3-4 minutes seizure on 03/17/17 and went to the ER for evaluation. Pt was hospitalized at [facility] for lower GI bleed. Pt has not had an MRI due to weight.  Pt's HGB has decreased from hospital discharge to ER visit. Pt has had no bleeding or abd pain since discharge, He has not had a spell since Friday. Pt was taken off of his Celebrex and arm/shoulder pain has returned. Pt has tingling right arm. This started with the seizures.        continuation     Pt was supposed to be takne off of the Metformin due to kidneys but did not.        SUBJECTIVE:  Here for f/u.  Had a GIB and is having confusing spells at home.  Caregiver frustrated as she does not know what is going on.  I reviewed and tried to explain.  See below.      Past Medical History   Diagnosis Date     Chronic kidney disease, stage III (moderate)      Obesity, unspecified      Obstructive sleep apnea (adult) (pediatric)      Osteoarthrosis, unspecified whether generalized or localized, lower leg      Other abnormal blood chemistry      hyperuricemia     Other B-complex deficiencies      Other choreas      Hemiballism     Pure hypercholesterolemia      Skin ulcer (H)      Type II or unspecified type diabetes mellitus without mention of complication, not stated as uncontrolled      Unspecified essential hypertension        Past Surgical History   Procedure Laterality Date     Genitourinary surgery       removal of kidney stones     Orthopedic surgery       knee arthroscopy     Orthopedic surgery  2003     bilateral knee replacement     Orthopedic surgery       right knee arthroscopy     Tonsillectomy & adenoidectomy  1942     Ent surgery       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     Phacoemulsification with standard intraocular lens implant  2/11/2014     Procedure: PHACOEMULSIFICATION WITH STANDARD INTRAOCULAR LENS  IMPLANT;  CATARACT EXTRACTION WITH INTRA OCULAR LENS RIGHT;  Surgeon: Luis James MD;  Location: [facility] OR     Phacoemulsification with standard intraocular lens implant  3/11/2014     Procedure: PHACOEMULSIFICATION WITH STANDARD INTRAOCULAR LENS IMPLANT;  CATARACT EXTRACTION WITH INTRA OCULAR LENS LEFT  ;  Surgeon: Luis James MD;  Location: [facility] OR     Colonoscopy         Current Outpatient Prescriptions   Medication Sig Dispense Refill     levETIRAcetam 1000 MG TABS Take 1,000 mg by mouth 2 times daily       fish oil-omega-3 fatty acids 1000 MG capsule Take 1,000 mg by mouth daily       Zinc Sulfate 220 (50 ZN) MG TABS Take 220 mg by mouth daily       simvastatin (ZOCOR) 10 MG tablet TAKE 1 TABLET (10 MG) BY MOUTH AT BEDTIME 90 tablet 1     Lancets Misc. (UNISTIK 3 NORMAL) MISC USE TO TEST BLOOD SUGARS TWO TIMES A  each 1     allopurinol (ZYLOPRIM) 300 MG tablet TAKE 1 TABLET BY MOUTH EVERY DAY 90 tablet 0     metFORMIN (GLUCOPHAGE) 1000 MG tablet TAKE ONE TABLET BY MOUTH EVERY DAY WITH A MEAL 90 tablet 0     cyanocolbalamin (VITAMIN  B-12) 500 MCG tablet TAKE 2 TABLETS (1,000 MCG) BY MOUTH DAILY 180 tablet 1     NOVOLOG FLEXPEN 100 UNIT/ML soln INJECT 8 UNITS SUBQ BEFORE BEDTIME IN ADDITION TO SLIDING SCALE 9 mL 0     terazosin (HYTRIN) 10 MG capsule TAKE 1 CAPSULE BY MOUTH NIGHTLY AT BEDTIME 90 capsule 2     sertraline (ZOLOFT) 100 MG tablet TAKE 1 TABLET BY MOUTH ONCE DAILY 90 tablet 3     polyethylene glycol (MIRALAX/GLYCOLAX) powder TAKE 17 GRAMS BY MOUTH DAILY MIXED AS DIRECTED. 527 g 6     DOCQLACE 100 MG capsule TAKE 1 CAPSULE BY MOUTH TWICE DAILY 100 capsule 3     Oyster Shell Calcium (OS-MARILIN 500 MG [place]. [place]) 500 MG tablet TAKE ONE TABLET BY MOUTH TWO TIMES A  tablet 3     Cholecalciferol (VITAMIN D HIGH POTENCY) 1000 UNITS CAPS TAKE 1 CAPSULE BY MOUTH DAILY 90 capsule 3     metoprolol (LOPRESSOR) 25 MG tablet TAKE 1/2 TABLET TWO TIMES A DAY 90 tablet 1     Acetaminophen (TYLENOL PO)  "Take 500 mg by mouth Takes 2 tablets at HS       blood glucose monitoring (KEO CONTOUR MONITOR) meter device kit Use to test blood sugars 2 times daily or as directed. 1 kit 0     blood glucose monitoring (KEO CONTOUR) test strip Use to test blood sugars 2 times daily or as directed. 200 each 3     blood glucose (KEO MICROLET 2) lancing device Use to test blood sugars 2 times daily or as directed. 200 each 3     insulin pen needle (NOVOFINE 30) 30G X 8 MM Use 4 pen needles daily or as directed. 100 each 6     Silver-Carboxymethylcellulose (AQUACEL-AG EXTRA HYDROFIBER) 4\"X5\" PADS Externally apply 0.25 each topically every other day Use as directed by wound center 5 each 3     LANTUS SOLOSTAR 100 UNIT/ML soln INJECT 18 UNITS SUBQ EVERY DAY AT BEDTIME 15 mL 2     order for DME 4x4 bulk SOFT gauze (disp 1 sleeve, refill x 5) 1 Units 0     Ascorbic Acid (VITAMIN C PO) Take by mouth daily       vitamin E (TOCOPHEROL) 100 UNIT capsule Take 100 Units by mouth daily Uncertain of dose       miconazole (MICATIN) 2 % AERP Apply 113 g topically 2 times daily 1 each 3     captopril (CAPOTEN) 50 MG tablet Take 2 tablets (100 mg) by mouth 3 times daily (Patient taking differently: Take 50 mg by mouth 3 times daily ) 270 tablet 3     order for DME Equipment being ordered: Meplilex Transfer 6 X 8 inch (327456) - disp 3;  Aquacel AG 4 X 5 ([id number]); disp 3 - Refills 6 1 each 6     order for DME Equipment being ordered: Oxygen 2 LPM per nasal cannula during the day, portable also 1 each 11     order for DME Equipment being ordered: Wheelchair 1 each 0     Elastic Bandages & Supports (T.E.D. BELOW KNEE/L-REGULAR) MISC Apply in am and take off in the evening 6 each 11     insulin glargine (LANTUS SOLOSTAR) 100 UNIT/ML vial Inject 18 units subcutaneous at bedtime. (Patient taking differently: Inject 18 Units Subcutaneous At Bedtime Inject 18 units subcutaneous at bedtime.) 15 mL 6     ketoconazole (NIZORAL) 2 % cream Apply topically " 2 times daily (Patient taking differently: Apply topically 2 times daily as needed ) 120 g 1     calcium carbonate (OS-MARILIN 500 MG [place]. [place]) 500 MG tablet Take 1 tablet (500 mg) by mouth 2 times daily 180 tablet 1     UNABLE TO FIND CPAP       aspirin 81 MG tablet Take 1 tablet by mouth daily         Allergies   Allergen Reactions     Hydrocodone      Intolerance       Penicillins Swelling       Family History   Problem Relation Age of Onset     C.A.D. Father      MI     Other - See Comments Father      shell shocked in the war     DIABETES Paternal Grandmother        Social History     Social History     Marital status:      Spouse name: N/A     Number of children: N/A     Years of education: N/A     Occupational History           retired     Social History Main Topics     Smoking status: Former Smoker     Years: 10.00     Types: Cigars     Quit date: 9/29/1987     Smokeless tobacco: Never Used     Alcohol use No     Drug use: No     Sexual activity: No      Comment:      Other Topics Concern     Parent/Sibling W/ Cabg, Mi Or Angioplasty Before 65f 55m? No      Service Yes     [employer]     Blood Transfusions Yes     Permits if needed     Seat Belt Yes     Social History Narrative       5 point ROS negative except as noted above in HPI, including Gen., Resp., CV, GI &  system review.     OBJECTIVE:  B/P: 108/54, T: 97.3, P: 72, R: 20    GENERAL APPEARANCE: Alert, no acute distress  CV: regular rate and rhythm, no murmur, rub or gallop  RESP: lungs clear to auscultation bilaterally  ABDOMEN: normal bowel sounds, soft, nontender, no hepatosplenomegaly or other masses  SKIN: no suspicious lesions or rashes to visualized skin  NEURO: Alert, oriented x 3, speech and mentation normal    ASSESSMENT and PLAN:  (R60.26) Spells  (primary encounter diagnosis)  Comment: ongoing  Plan: suspect maybe due to low bp following the GIB. Either way, reviewed all studies including EEG and echo and  brain imaging from D/C summary from [facility].      (K92.2) Gastrointestinal hemorrhage, unspecified gastrointestinal hemorrhage type  Comment: discussed.    Plan: no source found.  No ongoing bleeding.  Maria Eugenia knows this is an emergency if it happens.     (G40.909) Seizure disorder (H)  Comment: reviewed.    Plan: continuing the same dose of keppra without change.  Reviewed nonspecific EEG findings.      30 minutes spent with patient and caregiver.  They are pleased with the explanations about the spells, the workup, the GIB.  Over 50% in counseling about all of this.  2 week f/u set up as there is a lot going on here.

## 2017-03-20 NOTE — MR AVS SNAPSHOT
After Visit Summary   3/20/2017    Trey Washington    MRN: [id number]           Patient Information     Date Of Birth          1935        Visit Information        Provider Department      3/20/2017 11:00 AM Moreno Desai MD [facility]        Today's Diagnoses     Spells    -  1    Gastrointestinal hemorrhage, unspecified gastrointestinal hemorrhage type        Seizure disorder (H)          Care Instructions    F/u with ongoing concerns.         Follow-ups after your visit        Your next 10 appointments already scheduled     Apr 03, 2017 10:15 AM CDT   (Arrive by 10:00 AM)   SHORT with Moreno Desai MD   [facility] ([facility])    [address]  [facility] [address]   [phone]              Who to contact     If you have questions or need follow up information about today's clinic visit or your schedule please contact [facility] directly at [phone].  Normal or non-critical lab and imaging results will be communicated to you by MyChart, letter or phone within 4 business days after the clinic has received the results. If you do not hear from us within 7 days, please contact the clinic through [employer] or phone. If you have a critical or abnormal lab result, we will notify you by phone as soon as possible.  Submit refill requests through [employer] or call your pharmacy and they will forward the refill request to us. Please allow 3 business days for your refill to be completed.          Additional Information About Your Visit        MyChart Information     [employer] gives you secure access to your electronic health record. If you see a primary care provider, you can also send messages to your care team and make appointments. If you have questions, please call your primary care clinic.  If you do not have a primary care provider, please call [phone] and they will assist you.        Care EveryWhere ID     This is your Care  EveryWhere ID. This could be used by other organizations to access your [facility] medical records  [id number]        Your Vitals Were     Pulse Temperature Respirations Pulse Oximetry          72 97.3  F (36.3  C) (Tympanic) 20 88%         Blood Pressure from Last 3 Encounters:   03/20/17 108/54   03/17/17 (!) 184/102   03/11/17 (!) 166/106    Weight from Last 3 Encounters:   10/05/16 (!) 350 lb (158.8 kg)   06/29/16 (!) 355 lb (161 kg)   05/12/16 (!) 350 lb (158.8 kg)              We Performed the Following     Basic metabolic panel     CBC with platelets and differential          Today's Medication Changes          These changes are accurate as of: 3/20/17 12:01 PM.  If you have any questions, ask your nurse or doctor.               These medicines have changed or have updated prescriptions.        Dose/Directions    captopril 50 MG tablet   Commonly known as:  CAPOTEN   This may have changed:  how much to take   Used for:  Essential hypertension        Dose:  100 mg   Take 2 tablets (100 mg) by mouth 3 times daily   Quantity:  270 tablet   Refills:  3       * insulin glargine 100 UNIT/ML injection   Commonly known as:  LANTUS SOLOSTAR   This may have changed:    - how much to take  - how to take this  - when to take this  - additional instructions   Used for:  Type II or unspecified type diabetes mellitus without mention of complication, not stated as uncontrolled   Changed by:  Moreno Desai MD        Inject 18 units subcutaneous at bedtime.   Quantity:  15 mL   Refills:  6       * LANTUS SOLOSTAR 100 UNIT/ML injection   This may have changed:  Another medication with the same name was changed. Make sure you understand how and when to take each.   Used for:  Type 2 diabetes mellitus without complication (H)   Generic drug:  insulin glargine   Changed by:  Moreno Desai MD        INJECT 18 UNITS SUBQ EVERY DAY AT BEDTIME   Quantity:  15 mL   Refills:  2       ketoconazole 2 % cream   Commonly known as:  " CLEMENTINAZORAL   This may have changed:    - when to take this  - reasons to take this   Used for:  Tinea cruris        Apply topically 2 times daily   Quantity:  120 g   Refills:  1       * Notice:  This list has 2 medication(s) that are the same as other medications prescribed for you. Read the directions carefully, and ask your doctor or other care provider to review them with you.      Stop taking these medicines if you haven't already. Please contact your care team if you have questions.     metoprolol 25 MG tablet   Commonly known as:  LOPRESSOR   Stopped by:  Moreno Desai MD                    Primary Care Provider Office Phone # Fax #    Moreno Desai [phone]       [address] [address]        Thank you!     Thank you for choosing [facility]  for your care. Our goal is always to provide you with excellent care. Hearing back from our patients is one way we can continue to improve our services. Please take a few minutes to complete the written survey that you may receive in the mail after your visit with us. Thank you!             Your Updated Medication List - Protect others around you: Learn how to safely use, store and throw away your medicines at www.disposemymeds.org.          This list is accurate as of: 3/20/17 12:01 PM.  Always use your most recent med list.                   Brand Name Dispense Instructions for use    allopurinol 300 MG tablet    ZYLOPRIM    90 tablet    TAKE 1 TABLET BY MOUTH EVERY DAY       AQUACEL-AG EXTRA HYDROFIBER 4\"X5\" Pads     5 each    Externally apply 0.25 each topically every other day Use as directed by wound center       aspirin 81 MG tablet      Take 1 tablet by mouth daily       blood glucose lancing device     200 each    Use to test blood sugars 2 times daily or as directed.       blood glucose monitoring meter device kit     1 kit    Use to test blood sugars 2 times daily or as directed.    "    blood glucose monitoring test strip    KEO CONTOUR    200 each    Use to test blood sugars 2 times daily or as directed.       calcium carbonate 500 MG tablet    OS-MARILIN 500 mg [place]. [place]    180 tablet    Take 1 tablet (500 mg) by mouth 2 times daily       captopril 50 MG tablet    CAPOTEN    270 tablet    Take 2 tablets (100 mg) by mouth 3 times daily       cyanocolbalamin 500 MCG tablet    vitamin  B-12    180 tablet    TAKE 2 TABLETS (1,000 MCG) BY MOUTH DAILY       DOCQLACE 100 MG capsule   Generic drug:  docusate sodium     100 capsule    TAKE 1 CAPSULE BY MOUTH TWICE DAILY       fish oil-omega-3 fatty acids 1000 MG capsule      Take 1,000 mg by mouth daily       * insulin glargine 100 UNIT/ML injection    LANTUS SOLOSTAR    15 mL    Inject 18 units subcutaneous at bedtime.       * LANTUS SOLOSTAR 100 UNIT/ML injection   Generic drug:  insulin glargine     15 mL    INJECT 18 UNITS SUBQ EVERY DAY AT BEDTIME       insulin pen needle 30G X 8 MM    NOVOFINE 30    100 each    Use 4 pen needles daily or as directed.       ketoconazole 2 % cream    NIZORAL    120 g    Apply topically 2 times daily       levETIRAcetam 1000 MG Tabs      Take 1,000 mg by mouth 2 times daily       metFORMIN 1000 MG tablet    GLUCOPHAGE    90 tablet    TAKE ONE TABLET BY MOUTH EVERY DAY WITH A MEAL       miconazole 2 % Aerp powder    MICATIN    1 each    Apply 113 g topically 2 times daily       NovoLOG FLEXPEN 100 UNIT/ML injection   Generic drug:  insulin aspart     9 mL    INJECT 8 UNITS SUBQ BEFORE BEDTIME IN ADDITION TO SLIDING SCALE       * order for DME     1 each    Equipment being ordered: Oxygen 2 LPM per nasal cannula during the day, portable also       * order for DME     1 each    Equipment being ordered: Wheelchair       * order for DME     1 each    Equipment being ordered: Meplilex Transfer 6 X 8 inch ([id number]) - disp 3;  Aquacel AG 4 X 5 ([id number]); disp 3 - Refills 6       order for DME     1 Units    4x4 bulk SOFT gauze  (disp 1 sleeve, refill x 5)       Oyster Shell Calcium 500 MG tablet    OS-[place] 500 mg [place]. [place]    180 tablet    TAKE ONE TABLET BY MOUTH TWO TIMES A DAY       polyethylene glycol powder    MIRALAX/GLYCOLAX    527 g    TAKE 17 GRAMS BY MOUTH DAILY MIXED AS DIRECTED.       sertraline 100 MG tablet    ZOLOFT    90 tablet    TAKE 1 TABLET BY MOUTH ONCE DAILY       simvastatin 10 MG tablet    ZOCOR    90 tablet    TAKE 1 TABLET (10 MG) BY MOUTH AT BEDTIME       T.E.D. BELOW KNEE/L-REGULAR Misc     6 each    Apply in am and take off in the evening       terazosin 10 MG capsule    HYTRIN    90 capsule    TAKE 1 CAPSULE BY MOUTH NIGHTLY AT BEDTIME       TYLENOL PO      Take 500 mg by mouth Takes 2 tablets at HS       UNABLE TO FIND      CPAP       UNISTIK 3 NORMAL Misc     100 each    USE TO TEST BLOOD SUGARS TWO TIMES A DAY       VITAMIN C PO      Take by mouth daily       VITAMIN D HIGH POTENCY 1000 UNITS Caps     90 capsule    TAKE 1 CAPSULE BY MOUTH DAILY       vitamin E 100 UNIT capsule    TOCOPHEROL     Take 100 Units by mouth daily Uncertain of dose       Zinc Sulfate 220 (50 ZN) MG Tabs      Take 220 mg by mouth daily       * Notice:  This list has 5 medication(s) that are the same as other medications prescribed for you. Read the directions carefully, and ask your doctor or other care provider to review them with you.

## 2017-03-21 ENCOUNTER — TELEPHONE (OUTPATIENT)
Dept: FAMILY MEDICINE | Facility: OTHER | Age: 82
End: 2017-03-21

## 2017-03-21 DIAGNOSIS — E78.5 HYPERLIPIDEMIA LDL GOAL <100: ICD-10-CM

## 2017-03-21 NOTE — TELEPHONE ENCOUNTER
I called Maria Eugenia with the pt's test results. He needs labs at his next appt. Please sign his lab orders. Would you like to repeat his CBC at that time also?

## 2017-03-28 DIAGNOSIS — G40.909 SEIZURE DISORDER (H): Primary | ICD-10-CM

## 2017-03-28 RX ORDER — LEVETIRACETAM 1000 MG/1
1000 TABLET ORAL 2 TIMES DAILY
Qty: 60 TABLET | Refills: 3 | Status: SHIPPED | OUTPATIENT
Start: 2017-03-28 | End: 2017-06-27

## 2017-03-28 RX ORDER — LEVETIRACETAM 1000 MG/1
1000 TABLET ORAL 2 TIMES DAILY
Qty: 60 TABLET | Refills: 3 | Status: CANCELLED | OUTPATIENT
Start: 2017-03-28

## 2017-04-03 ENCOUNTER — OFFICE VISIT (OUTPATIENT)
Dept: FAMILY MEDICINE | Facility: OTHER | Age: 82
End: 2017-04-03
Attending: FAMILY MEDICINE
Payer: MEDICARE

## 2017-04-03 VITALS
HEIGHT: 70 IN | TEMPERATURE: 97.1 F | SYSTOLIC BLOOD PRESSURE: 132 MMHG | WEIGHT: 315 LBS | HEART RATE: 73 BPM | DIASTOLIC BLOOD PRESSURE: 86 MMHG | RESPIRATION RATE: 20 BRPM | BODY MASS INDEX: 45.1 KG/M2 | OXYGEN SATURATION: 86 %

## 2017-04-03 DIAGNOSIS — I95.1 ORTHOSTATIC HYPOTENSION: ICD-10-CM

## 2017-04-03 DIAGNOSIS — Z51.5 END OF LIFE CARE: ICD-10-CM

## 2017-04-03 DIAGNOSIS — E78.5 HYPERLIPIDEMIA LDL GOAL <100: ICD-10-CM

## 2017-04-03 DIAGNOSIS — E11.9 CONTROLLED TYPE 2 DIABETES MELLITUS WITHOUT COMPLICATION, WITH LONG-TERM CURRENT USE OF INSULIN (H): Primary | ICD-10-CM

## 2017-04-03 DIAGNOSIS — M79.622 PAIN OF LEFT UPPER ARM: ICD-10-CM

## 2017-04-03 DIAGNOSIS — Z79.4 CONTROLLED TYPE 2 DIABETES MELLITUS WITHOUT COMPLICATION, WITH LONG-TERM CURRENT USE OF INSULIN (H): Primary | ICD-10-CM

## 2017-04-03 DIAGNOSIS — D64.9 ANEMIA, UNSPECIFIED TYPE: ICD-10-CM

## 2017-04-03 LAB
ANION GAP SERPL CALCULATED.3IONS-SCNC: 11 MMOL/L (ref 3–14)
BUN SERPL-MCNC: 32 MG/DL (ref 7–30)
CALCIUM SERPL-MCNC: 9.4 MG/DL (ref 8.5–10.1)
CHLORIDE SERPL-SCNC: 104 MMOL/L (ref 94–109)
CHOLEST SERPL-MCNC: 182 MG/DL
CO2 SERPL-SCNC: 27 MMOL/L (ref 20–32)
CREAT SERPL-MCNC: 1.61 MG/DL (ref 0.66–1.25)
CREAT UR-MCNC: 65 MG/DL
ERYTHROCYTE [DISTWIDTH] IN BLOOD BY AUTOMATED COUNT: 14.9 % (ref 10–15)
EST. AVERAGE GLUCOSE BLD GHB EST-MCNC: 100 MG/DL
GFR SERPL CREATININE-BSD FRML MDRD: 41 ML/MIN/1.7M2
GLUCOSE SERPL-MCNC: 113 MG/DL (ref 70–99)
HBA1C MFR BLD: 5.1 % (ref 4.3–6)
HCT VFR BLD AUTO: 33.6 % (ref 40–53)
HDLC SERPL-MCNC: 48 MG/DL
HGB BLD-MCNC: 10.5 G/DL (ref 13.3–17.7)
LDLC SERPL CALC-MCNC: 103 MG/DL
MCH RBC QN AUTO: 31.1 PG (ref 26.5–33)
MCHC RBC AUTO-ENTMCNC: 31.3 G/DL (ref 31.5–36.5)
MCV RBC AUTO: 99 FL (ref 78–100)
MICROALBUMIN UR-MCNC: 469 MG/L
MICROALBUMIN/CREAT UR: 720.43 MG/G CR (ref 0–17)
NONHDLC SERPL-MCNC: 134 MG/DL
PLATELET # BLD AUTO: 165 10E9/L (ref 150–450)
POTASSIUM SERPL-SCNC: 4.1 MMOL/L (ref 3.4–5.3)
RBC # BLD AUTO: 3.38 10E12/L (ref 4.4–5.9)
SODIUM SERPL-SCNC: 142 MMOL/L (ref 133–144)
TRIGL SERPL-MCNC: 155 MG/DL
WBC # BLD AUTO: 5.5 10E9/L (ref 4–11)

## 2017-04-03 PROCEDURE — 85027 COMPLETE CBC AUTOMATED: CPT | Performed by: FAMILY MEDICINE

## 2017-04-03 PROCEDURE — 36415 COLL VENOUS BLD VENIPUNCTURE: CPT | Performed by: FAMILY MEDICINE

## 2017-04-03 PROCEDURE — 82043 UR ALBUMIN QUANTITATIVE: CPT | Performed by: FAMILY MEDICINE

## 2017-04-03 PROCEDURE — 99212 OFFICE O/P EST SF 10 MIN: CPT

## 2017-04-03 PROCEDURE — 40000788 ZZHCL STATISTIC ESTIMATED AVERAGE GLUCOSE: Performed by: FAMILY MEDICINE

## 2017-04-03 PROCEDURE — 80048 BASIC METABOLIC PNL TOTAL CA: CPT | Performed by: FAMILY MEDICINE

## 2017-04-03 PROCEDURE — 83036 HEMOGLOBIN GLYCOSYLATED A1C: CPT | Performed by: FAMILY MEDICINE

## 2017-04-03 PROCEDURE — 99214 OFFICE O/P EST MOD 30 MIN: CPT | Performed by: FAMILY MEDICINE

## 2017-04-03 PROCEDURE — 80061 LIPID PANEL: CPT | Performed by: FAMILY MEDICINE

## 2017-04-03 ASSESSMENT — PAIN SCALES - GENERAL: PAINLEVEL: NO PAIN (0)

## 2017-04-03 NOTE — MR AVS SNAPSHOT
After Visit Summary   4/3/2017    Trey Washington    MRN: [id number]           Patient Information     Date Of Birth          1935        Visit Information        Provider Department      4/3/2017 10:15 AM Moreno Desai MD [facility]        Today's Diagnoses     Controlled type 2 diabetes mellitus without complication, with long-term current use of insulin (H)    -  1    Hyperlipidemia LDL goal <100        IDDM (insulin dependent diabetes mellitus) (H)        Anemia, unspecified type        Orthostatic hypotension        Pain of left upper arm        End of life care          Care Instructions    F/u with ongoing concerns.         Follow-ups after your visit        Who to contact     If you have questions or need follow up information about today's clinic visit or your schedule please contact [facility] directly at [phone].  Normal or non-critical lab and imaging results will be communicated to you by MyChart, letter or phone within 4 business days after the clinic has received the results. If you do not hear from us within 7 days, please contact the clinic through MyChart or phone. If you have a critical or abnormal lab result, we will notify you by phone as soon as possible.  Submit refill requests through [employer] or call your pharmacy and they will forward the refill request to us. Please allow 3 business days for your refill to be completed.          Additional Information About Your Visit        MyChart Information     [employer] gives you secure access to your electronic health record. If you see a primary care provider, you can also send messages to your care team and make appointments. If you have questions, please call your primary care clinic.  If you do not have a primary care provider, please call [phone] and they will assist you.        Care EveryWhere ID     This is your Care EveryWhere ID. This could be used by other organizations to access  "your [place] medical records  [id number]        Your Vitals Were     Pulse Temperature Respirations Height Pulse Oximetry BMI (Body Mass Index)    73 97.1  F (36.2  C) (Tympanic) 20 5' 10\" (1.778 m) 86% 52.52 kg/m2       Blood Pressure from Last 3 Encounters:   04/03/17 132/86   03/20/17 108/54   03/17/17 (!) 184/102    Weight from Last 3 Encounters:   04/03/17 (!) 366 lb (166 kg)   10/05/16 (!) 350 lb (158.8 kg)   06/29/16 (!) 355 lb (161 kg)              We Performed the Following     Basic metabolic panel     CBC with platelets     Estimated Average Glucose     Hemoglobin A1c     Lipid Profile (Chol, Trig, HDL, LDL calc)     Microalbumin quantitative random urine          Today's Medication Changes          These changes are accurate as of: 4/3/17 12:46 PM.  If you have any questions, ask your nurse or doctor.               These medicines have changed or have updated prescriptions.        Dose/Directions    captopril 50 MG tablet   Commonly known as:  CAPOTEN   This may have changed:  how much to take   Used for:  Essential hypertension        Dose:  100 mg   Take 2 tablets (100 mg) by mouth 3 times daily   Quantity:  270 tablet   Refills:  3       * insulin glargine 100 UNIT/ML injection   Commonly known as:  LANTUS SOLOSTAR   This may have changed:    - how much to take  - how to take this  - when to take this  - additional instructions   Used for:  Type II or unspecified type diabetes mellitus without mention of complication, not stated as uncontrolled   Changed by:  Moreno Desai MD        Inject 18 units subcutaneous at bedtime.   Quantity:  15 mL   Refills:  6       * LANTUS SOLOSTAR 100 UNIT/ML injection   This may have changed:  Another medication with the same name was changed. Make sure you understand how and when to take each.   Used for:  Type 2 diabetes mellitus without complication (H)   Generic drug:  insulin glargine   Changed by:  Moreno Desai MD        INJECT 18 UNITS SUBQ EVERY " "DAY AT BEDTIME   Quantity:  15 mL   Refills:  2       ketoconazole 2 % cream   Commonly known as:  NIZORAL   This may have changed:    - when to take this  - reasons to take this   Used for:  Tinea cruris        Apply topically 2 times daily   Quantity:  120 g   Refills:  1       * Notice:  This list has 2 medication(s) that are the same as other medications prescribed for you. Read the directions carefully, and ask your doctor or other care provider to review them with you.             Primary Care Provider Office Phone # Fax #    Moreno Desai [phone]       [facility] [address] [facility] [address]        Thank you!     Thank you for choosing [facility]  for your care. Our goal is always to provide you with excellent care. Hearing back from our patients is one way we can continue to improve our services. Please take a few minutes to complete the written survey that you may receive in the mail after your visit with us. Thank you!             Your Updated Medication List - Protect others around you: Learn how to safely use, store and throw away your medicines at www.disposemymeds.org.          This list is accurate as of: 4/3/17 12:46 PM.  Always use your most recent med list.                   Brand Name Dispense Instructions for use    allopurinol 300 MG tablet    ZYLOPRIM    90 tablet    TAKE 1 TABLET BY MOUTH EVERY DAY       [facility] Health[facility]AG EXTRA HYDROFIBER 4\"X5\" Pads     5 each    Externally apply 0.25 each topically every other day Use as directed by wound center       aspirin 81 MG tablet      Take 1 tablet by mouth daily       blood glucose lancing device     200 each    Use to test blood sugars 2 times daily or as directed.       blood glucose monitoring meter device kit     1 kit    Use to test blood sugars 2 times daily or as directed.       blood glucose monitoring test strip    KEO CONTOUR    200 each    Use to test blood sugars 2 times daily or as " directed.       calcium carbonate 500 MG tablet    OS-MORRIS 500 mg [place]. Ca    180 tablet    Take 1 tablet (500 mg) by mouth 2 times daily       captopril 50 MG tablet    CAPOTEN    270 tablet    Take 2 tablets (100 mg) by mouth 3 times daily       cyanocolbalamin 500 MCG tablet    vitamin  B-12    180 tablet    TAKE 2 TABLETS (1,000 MCG) BY MOUTH DAILY       DOCQLACE 100 MG capsule   Generic drug:  docusate sodium     100 capsule    TAKE 1 CAPSULE BY MOUTH TWICE DAILY       fish oil-omega-3 fatty acids 1000 MG capsule      Take 1,000 mg by mouth daily       * insulin glargine 100 UNIT/ML injection    LANTUS SOLOSTAR    15 mL    Inject 18 units subcutaneous at bedtime.       * LANTUS SOLOSTAR 100 UNIT/ML injection   Generic drug:  insulin glargine     15 mL    INJECT 18 UNITS SUBQ EVERY DAY AT BEDTIME       insulin pen needle 30G X 8 MM    NOVOFINE 30    100 each    Use 4 pen needles daily or as directed.       ketoconazole 2 % cream    NIZORAL    120 g    Apply topically 2 times daily       levETIRAcetam 1000 MG Tabs     60 tablet    Take 1,000 mg by mouth 2 times daily       metFORMIN 1000 MG tablet    GLUCOPHAGE    90 tablet    TAKE ONE TABLET BY MOUTH EVERY DAY WITH A MEAL       miconazole 2 % Aerp powder    MICATIN    1 each    Apply 113 g topically 2 times daily       NovoLOG FLEXPEN 100 UNIT/ML injection   Generic drug:  insulin aspart     9 mL    INJECT 8 UNITS SUBQ BEFORE BEDTIME IN ADDITION TO SLIDING SCALE       * order for DME     1 each    Equipment being ordered: Oxygen 2 LPM per nasal cannula during the day, portable also       * order for DME     1 each    Equipment being ordered: Wheelchair       * order for DME     1 each    Equipment being ordered: Meplilex Transfer 6 X 8 inch ([id number]) - disp 3;  Aquacel AG 4 X 5 ([id number]); disp 3 - Refills 6       order for DME     1 Units    4x4 bulk SOFT gauze (disp 1 sleeve, refill x 5)       Oyster Shell Calcium 500 MG tablet    OS-[place] 500 mg [place]. Ca    180  tablet    TAKE ONE TABLET BY MOUTH TWO TIMES A DAY       polyethylene glycol powder    MIRALAX/GLYCOLAX    527 g    TAKE 17 GRAMS BY MOUTH DAILY MIXED AS DIRECTED.       sertraline 100 MG tablet    ZOLOFT    90 tablet    TAKE 1 TABLET BY MOUTH ONCE DAILY       simvastatin 10 MG tablet    ZOCOR    90 tablet    TAKE 1 TABLET (10 MG) BY MOUTH AT BEDTIME       T.E.D. BELOW KNEE/L-REGULAR Misc     6 each    Apply in am and take off in the evening       terazosin 10 MG capsule    HYTRIN    90 capsule    TAKE 1 CAPSULE BY MOUTH NIGHTLY AT BEDTIME       TYLENOL PO      Take 500 mg by mouth Takes 2 tablets at HS       UNABLE TO FIND      CPAP       UNISTIK 3 NORMAL Misc     100 each    USE TO TEST BLOOD SUGARS TWO TIMES A DAY       VITAMIN C PO      Take by mouth daily       VITAMIN D HIGH POTENCY 1000 UNITS Caps     90 capsule    TAKE 1 CAPSULE BY MOUTH DAILY       vitamin E 100 UNIT capsule    TOCOPHEROL     Take 100 Units by mouth daily Uncertain of dose       Zinc Sulfate 220 (50 ZN) MG Tabs      Take 220 mg by mouth daily       * Notice:  This list has 5 medication(s) that are the same as other medications prescribed for you. Read the directions carefully, and ask your doctor or other care provider to review them with you.

## 2017-04-03 NOTE — PROGRESS NOTES
Trey Washington    April 3, 2017    Chief Complaint   Patient presents with     RECHECK     Pt is in for a FU on 03/20/17 appt. Pt is feeling better. Pt had one day of pain related to umbilical hernia.      Diabetes     Pt had DM labs done this am.       SUBJECTIVE:  Here for f/u.  Had orthostasis, I stopped the beta blocker, and he couldn't be more pleased.  Doing great now.  No further GI bleeding.  He is able to do more at home.  No more spells.  Due for dm f/u.      Past Medical History:   Diagnosis Date     Chronic kidney disease, stage III (moderate)      Obesity, unspecified      Obstructive sleep apnea (adult) (pediatric)      Osteoarthrosis, unspecified whether generalized or localized, lower leg      Other abnormal blood chemistry     hyperuricemia     Other B-complex deficiencies      Other choreas     Hemiballism     Pure hypercholesterolemia      Skin ulcer (H)      Type II or unspecified type diabetes mellitus without mention of complication, not stated as uncontrolled      Unspecified essential hypertension        Past Surgical History:   Procedure Laterality Date     COLONOSCOPY       ENT SURGERY      lanced abcess in left ear     GENITOURINARY SURGERY      removal of kidney stones     ORTHOPEDIC SURGERY      knee arthroscopy     ORTHOPEDIC SURGERY  2003    bilateral knee replacement     ORTHOPEDIC SURGERY      right knee arthroscopy     PHACOEMULSIFICATION WITH STANDARD INTRAOCULAR LENS IMPLANT  2/11/2014    Procedure: PHACOEMULSIFICATION WITH STANDARD INTRAOCULAR LENS IMPLANT;  CATARACT EXTRACTION WITH INTRA OCULAR LENS RIGHT;  Surgeon: Luis James MD;  Location: [facility] OR     PHACOEMULSIFICATION WITH STANDARD INTRAOCULAR LENS IMPLANT  3/11/2014    Procedure: PHACOEMULSIFICATION WITH STANDARD INTRAOCULAR LENS IMPLANT;  CATARACT EXTRACTION WITH INTRA OCULAR LENS LEFT  ;  Surgeon: Luis James MD;  Location: [facility] OR     TONSILLECTOMY & ADENOIDECTOMY  1942       Current Outpatient Prescriptions    Medication Sig Dispense Refill     levETIRAcetam 1000 MG TABS Take 1,000 mg by mouth 2 times daily 60 tablet 3     fish oil-omega-3 fatty acids 1000 MG capsule Take 1,000 mg by mouth daily       Zinc Sulfate 220 (50 ZN) MG TABS Take 220 mg by mouth daily       simvastatin (ZOCOR) 10 MG tablet TAKE 1 TABLET (10 MG) BY MOUTH AT BEDTIME 90 tablet 1     Lancets Misc. (UNISTIK 3 NORMAL) MISC USE TO TEST BLOOD SUGARS TWO TIMES A  each 1     allopurinol (ZYLOPRIM) 300 MG tablet TAKE 1 TABLET BY MOUTH EVERY DAY 90 tablet 0     metFORMIN (GLUCOPHAGE) 1000 MG tablet TAKE ONE TABLET BY MOUTH EVERY DAY WITH A MEAL 90 tablet 0     cyanocolbalamin (VITAMIN  B-12) 500 MCG tablet TAKE 2 TABLETS (1,000 MCG) BY MOUTH DAILY 180 tablet 1     NOVOLOG FLEXPEN 100 UNIT/ML soln INJECT 8 UNITS SUBQ BEFORE BEDTIME IN ADDITION TO SLIDING SCALE 9 mL 0     terazosin (HYTRIN) 10 MG capsule TAKE 1 CAPSULE BY MOUTH NIGHTLY AT BEDTIME 90 capsule 2     sertraline (ZOLOFT) 100 MG tablet TAKE 1 TABLET BY MOUTH ONCE DAILY 90 tablet 3     polyethylene glycol (MIRALAX/GLYCOLAX) powder TAKE 17 GRAMS BY MOUTH DAILY MIXED AS DIRECTED. 527 g 6     DOCQLACE 100 MG capsule TAKE 1 CAPSULE BY MOUTH TWICE DAILY 100 capsule 3     Oyster Shell Calcium (OS-MARILIN 500 MG [place]. [place]) 500 MG tablet TAKE ONE TABLET BY MOUTH TWO TIMES A  tablet 3     Cholecalciferol (VITAMIN D HIGH POTENCY) 1000 UNITS CAPS TAKE 1 CAPSULE BY MOUTH DAILY 90 capsule 3     Acetaminophen (TYLENOL PO) Take 500 mg by mouth Takes 2 tablets at HS       blood glucose monitoring (KEO CONTOUR MONITOR) meter device kit Use to test blood sugars 2 times daily or as directed. 1 kit 0     blood glucose monitoring (KEO CONTOUR) test strip Use to test blood sugars 2 times daily or as directed. 200 each 3     blood glucose (KEO MICROLET 2) lancing device Use to test blood sugars 2 times daily or as directed. 200 each 3     insulin pen needle (NOVOFINE 30) 30G X 8 MM Use 4 pen needles daily  "or as directed. 100 each 6     Silver-Carboxymethylcellulose (AQUACEL-AG EXTRA HYDROFIBER) 4\"X5\" PADS Externally apply 0.25 each topically every other day Use as directed by wound center 5 each 3     LANTUS SOLOSTAR 100 UNIT/ML soln INJECT 18 UNITS SUBQ EVERY DAY AT BEDTIME 15 mL 2     order for DME 4x4 bulk SOFT gauze (disp 1 sleeve, refill x 5) 1 Units 0     Ascorbic Acid (VITAMIN C PO) Take by mouth daily       vitamin E (TOCOPHEROL) 100 UNIT capsule Take 100 Units by mouth daily Uncertain of dose       miconazole (MICATIN) 2 % AERP Apply 113 g topically 2 times daily 1 each 3     captopril (CAPOTEN) 50 MG tablet Take 2 tablets (100 mg) by mouth 3 times daily (Patient taking differently: Take 50 mg by mouth 3 times daily ) 270 tablet 3     order for DME Equipment being ordered: Meplilex Transfer 6 X 8 inch ([id number]) - disp 3;  Aquacel AG 4 X 5 ([id number]); disp 3 - Refills 6 1 each 6     order for DME Equipment being ordered: Oxygen 2 LPM per nasal cannula during the day, portable also 1 each 11     order for DME Equipment being ordered: Wheelchair 1 each 0     Elastic Bandages & Supports (T.E.D. BELOW KNEE/L-REGULAR) MISC Apply in am and take off in the evening 6 each 11     insulin glargine (LANTUS SOLOSTAR) 100 UNIT/ML vial Inject 18 units subcutaneous at bedtime. (Patient taking differently: Inject 18 Units Subcutaneous At Bedtime Inject 18 units subcutaneous at bedtime.) 15 mL 6     ketoconazole (NIZORAL) 2 % cream Apply topically 2 times daily (Patient taking differently: Apply topically 2 times daily as needed ) 120 g 1     calcium carbonate (OS-MRAILIN 500 MG [place]. CA) 500 MG tablet Take 1 tablet (500 mg) by mouth 2 times daily 180 tablet 1     UNABLE TO FIND CPAP       aspirin 81 MG tablet Take 1 tablet by mouth daily         Allergies   Allergen Reactions     Hydrocodone      Intolerance       Penicillins Swelling       Family History   Problem Relation Age of Onset     C.A.D. Father      MI     Other - See " Comments Father      shell shocked in the war     DIABETES Paternal Grandmother        Social History     Social History     Marital status:      Spouse name: N/A     Number of children: N/A     Years of education: N/A     Occupational History           retired     Social History Main Topics     Smoking status: Former Smoker     Years: 10.00     Types: Cigars     Quit date: 9/29/1987     Smokeless tobacco: Never Used     Alcohol use No     Drug use: No     Sexual activity: No      Comment:      Other Topics Concern     Parent/Sibling W/ Cabg, Mi Or Angioplasty Before 65f 55m? No      Service Yes     [employer]     Blood Transfusions Yes     Permits if needed     Seat Belt Yes     Social History Narrative       5 point ROS negative except as noted above in HPI, including Gen., Resp., CV, GI &  system review.     OBJECTIVE:  B/P: 132/86, T: 97.1, P: 73, R: 20    GENERAL APPEARANCE: Alert, no acute distress  CV: regular rate and rhythm, no murmur, rub or gallop  RESP: lungs clear to auscultation bilaterally  ABDOMEN: normal bowel sounds, soft, nontender, no hepatosplenomegaly or other masses  SKIN: no suspicious lesions or rashes to visualized skin  NEURO: Alert, oriented x 3, speech and mentation normal    ASSESSMENT and PLAN:  (E11.9,  Z79.4) Controlled type 2 diabetes mellitus without complication, with long-term current use of insulin (H)  (primary encounter diagnosis)  Comment: doing well.  Plan: Estimated Average Glucose         Update labs and follow.  Doing very well without issue.      (E78.5) Hyperlipidemia LDL goal <100  Comment: stable.   Plan: update labs.    (D64.9) Anemia, unspecified type  Comment: improved.   Plan: no further bleeding.      (I95.1) Orthostatic hypotension  Comment: resolved.   Plan: he is feeling great.  No change there.      (M79.622) Pain of left upper arm  Comment: improved.   Plan: no change there either.  Ok with the tylenol alone.      (Z51.5)  End of life care  Comment: discussed at some length.   Plan: he is going to be DNR.  I gave him the paperwork.  His caregiver Maria Eugenia is here.  I spoke about this topic for 10 minutes alone with them.

## 2017-04-03 NOTE — NURSING NOTE
"Chief Complaint   Patient presents with     RECHECK     Pt is in for a FU on 03/20/17 appt. Pt is feeling better. Pt had one day of pain related to umbilical hernia.      Diabetes     Pt had DM labs done this am.       Initial /86 (BP Location: Right arm, Patient Position: Chair, Cuff Size: Adult Large)  Pulse 73  Temp 97.1  F (36.2  C) (Tympanic)  Resp 20  Ht 5' 10\" (1.778 m)  Wt (!) 366 lb (166 kg)  SpO2 (!) 86%  BMI 52.52 kg/m2 Estimated body mass index is 52.52 kg/(m^2) as calculated from the following:    Height as of this encounter: 5' 10\" (1.778 m).    Weight as of this encounter: 366 lb (166 kg).  Medication Reconciliation: complete   Krystyna Galindo    "

## 2017-04-04 DIAGNOSIS — I10 ESSENTIAL HYPERTENSION: ICD-10-CM

## 2017-04-04 RX ORDER — CAPTOPRIL 50 MG/1
100 TABLET ORAL 3 TIMES DAILY
Qty: 270 TABLET | Refills: 3 | Status: ON HOLD | OUTPATIENT
Start: 2017-04-04 | End: 2017-12-22

## 2017-04-25 DIAGNOSIS — K59.00 CONSTIPATION: ICD-10-CM

## 2017-04-25 DIAGNOSIS — I10 ESSENTIAL HYPERTENSION: ICD-10-CM

## 2017-04-27 RX ORDER — DOCUSATE SODIUM 100 MG/1
TABLET ORAL
Qty: 100 CAPSULE | Refills: 1 | Status: SHIPPED | OUTPATIENT
Start: 2017-04-27 | End: 2017-07-24

## 2017-04-27 RX ORDER — TERAZOSIN 10 MG/1
CAPSULE ORAL
Qty: 90 CAPSULE | Refills: 0 | Status: SHIPPED | OUTPATIENT
Start: 2017-04-27 | End: 2017-10-30

## 2017-05-09 DIAGNOSIS — E11.9 DIABETES MELLITUS, TYPE 2 (H): ICD-10-CM

## 2017-05-09 DIAGNOSIS — E11.9 TYPE 2 DIABETES MELLITUS WITHOUT COMPLICATION (H): ICD-10-CM

## 2017-05-10 NOTE — TELEPHONE ENCOUNTER
loraine         Last Written Prescription Date: 7/28/16  Last Fill Quantity: 15ml, # refills: 2  Last Office Visit with [facility] or [facility] prescribing provider:  4/3/17        BP Readings from Last 3 Encounters:   04/03/17 132/86   03/20/17 108/54   03/17/17 (!) 184/102     Lab Results   Component Value Date    MICROL 469 04/03/2017     Lab Results   Component Value Date    UMALCR 720.43 04/03/2017     Creatinine   Date Value Ref Range Status   04/03/2017 1.61 (H) 0.66 - 1.25 mg/dL Final   ]  GFR Estimate   Date Value Ref Range Status   04/03/2017 41 (L) >60 mL/min/1.7m2 Final     Comment:     Non  GFR Calc   03/20/2017 38 (L) >60 mL/min/1.7m2 Final     Comment:     Non  GFR Calc   03/17/2017 38 (L) >60 mL/min/1.7m2 Final     Comment:     Non  GFR Calc     GFR Estimate If Black   Date Value Ref Range Status   04/03/2017 50 (L) >60 mL/min/1.7m2 Final     Comment:      GFR Calc   03/20/2017 46 (L) >60 mL/min/1.7m2 Final     Comment:      GFR Calc   03/17/2017 46 (L) >60 mL/min/1.7m2 Final     Comment:      GFR Calc     Lab Results   Component Value Date    CHOL 182 04/03/2017     Lab Results   Component Value Date    HDL 48 04/03/2017     Lab Results   Component Value Date     04/03/2017     Lab Results   Component Value Date    TRIG 155 04/03/2017     Lab Results   Component Value Date    CHOLHDLRATIO 3.7 05/06/2015     Lab Results   Component Value Date    AST 14 03/11/2017     Lab Results   Component Value Date    ALT 12 03/11/2017     Lab Results   Component Value Date    A1C 5.1 04/03/2017    A1C 6.1 10/05/2016    A1C 6.3 04/18/2016    A1C 6.2 12/29/2015    A1C 7.6 10/13/2015     Potassium   Date Value Ref Range Status   04/03/2017 4.1 3.4 - 5.3 mmol/L Final

## 2017-05-11 RX ORDER — INSULIN GLARGINE 100 [IU]/ML
INJECTION, SOLUTION SUBCUTANEOUS
Qty: 15 ML | Refills: 0 | Status: SHIPPED | OUTPATIENT
Start: 2017-05-11 | End: 2017-06-27

## 2017-05-11 RX ORDER — LANCETS 23 GAUGE
EACH MISCELLANEOUS
Qty: 100 EACH | Refills: 2 | Status: SHIPPED | OUTPATIENT
Start: 2017-05-11 | End: 2017-10-17

## 2017-05-13 ENCOUNTER — MEDICAL CORRESPONDENCE (OUTPATIENT)
Dept: HEALTH INFORMATION MANAGEMENT | Facility: HOSPITAL | Age: 82
End: 2017-05-13

## 2017-06-06 DIAGNOSIS — E11.9 DIABETES MELLITUS, TYPE 2 (H): ICD-10-CM

## 2017-06-06 DIAGNOSIS — M10.00 IDIOPATHIC GOUT, UNSPECIFIED CHRONICITY, UNSPECIFIED SITE: ICD-10-CM

## 2017-06-08 RX ORDER — ALLOPURINOL 300 MG/1
TABLET ORAL
Qty: 90 TABLET | Refills: 0 | Status: SHIPPED | OUTPATIENT
Start: 2017-06-08 | End: 2017-08-21

## 2017-06-08 NOTE — TELEPHONE ENCOUNTER
Allopurinol 300mg      Last Written Prescription Date: 11/25/13  Last Fill Quantity: 90,  # refills: 0   Last Office Visit with [facility], [facility] or [facility] prescribing provider: 4/3/17    metformine 100mg         Last Written Prescription Date: 11/25/13  Last Fill Quantity: 90, # refills: 0  Last Office Visit with [facility], [facility] or [facility] prescribing provider:  4/3/17        BP Readings from Last 3 Encounters:   04/03/17 132/86   03/20/17 108/54   03/17/17 (!) 184/102     Lab Results   Component Value Date    MICROL 469 04/03/2017     Lab Results   Component Value Date    UMALCR 720.43 04/03/2017     Creatinine   Date Value Ref Range Status   04/03/2017 1.61 (H) 0.66 - 1.25 mg/dL Final   ]  GFR Estimate   Date Value Ref Range Status   04/03/2017 41 (L) >60 mL/min/1.7m2 Final     Comment:     Non  GFR Calc   03/20/2017 38 (L) >60 mL/min/1.7m2 Final     Comment:     Non  GFR Calc   03/17/2017 38 (L) >60 mL/min/1.7m2 Final     Comment:     Non  GFR Calc     GFR Estimate If Black   Date Value Ref Range Status   04/03/2017 50 (L) >60 mL/min/1.7m2 Final     Comment:      GFR Calc   03/20/2017 46 (L) >60 mL/min/1.7m2 Final     Comment:      GFR Calc   03/17/2017 46 (L) >60 mL/min/1.7m2 Final     Comment:      GFR Calc     Lab Results   Component Value Date    CHOL 182 04/03/2017     Lab Results   Component Value Date    HDL 48 04/03/2017     Lab Results   Component Value Date     04/03/2017     Lab Results   Component Value Date    TRIG 155 04/03/2017     Lab Results   Component Value Date    CHOLHDLRATIO 3.7 05/06/2015     Lab Results   Component Value Date    AST 14 03/11/2017     Lab Results   Component Value Date    ALT 12 03/11/2017     Lab Results   Component Value Date    A1C 5.1 04/03/2017    A1C 6.1 10/05/2016    A1C 6.3 04/18/2016    A1C 6.2 12/29/2015    A1C 7.6 10/13/2015     Potassium   Date Value Ref Range Status    04/03/2017 4.1 3.4 - 5.3 mmol/L Final

## 2017-06-19 DIAGNOSIS — E56.9 VITAMIN DEFICIENCY: ICD-10-CM

## 2017-06-19 RX ORDER — UREA 10 %
LOTION (ML) TOPICAL
Qty: 180 TABLET | Refills: 0 | Status: SHIPPED | OUTPATIENT
Start: 2017-06-19 | End: 2017-10-18

## 2017-06-26 ENCOUNTER — MEDICAL CORRESPONDENCE (OUTPATIENT)
Dept: HEALTH INFORMATION MANAGEMENT | Facility: HOSPITAL | Age: 82
End: 2017-06-26

## 2017-06-27 DIAGNOSIS — G40.909 SEIZURE DISORDER (H): ICD-10-CM

## 2017-06-27 DIAGNOSIS — E11.9 TYPE 2 DIABETES MELLITUS WITHOUT COMPLICATION (H): ICD-10-CM

## 2017-06-29 RX ORDER — INSULIN GLARGINE 100 [IU]/ML
INJECTION, SOLUTION SUBCUTANEOUS
Qty: 15 ML | Refills: 0 | Status: SHIPPED | OUTPATIENT
Start: 2017-06-29 | End: 2017-10-17

## 2017-06-29 RX ORDER — LEVETIRACETAM 1000 MG/1
TABLET ORAL
Qty: 60 TABLET | Refills: 0 | Status: SHIPPED | OUTPATIENT
Start: 2017-06-29 | End: 2017-08-21

## 2017-06-29 NOTE — TELEPHONE ENCOUNTER
[place]          Last Written Prescription Date: 5/11/2017  Last Fill Quantity: 15mL, # refills: 0  Last Office Visit with [facility], [facility] or [facility] prescribing provider:  04/03/2017        BP Readings from Last 3 Encounters:   04/03/17 132/86   03/20/17 108/54   03/17/17 (!) 184/102     Lab Results   Component Value Date    MICROL 469 04/03/2017     Lab Results   Component Value Date    UMALCR 720.43 04/03/2017     Creatinine   Date Value Ref Range Status   04/03/2017 1.61 (H) 0.66 - 1.25 mg/dL Final   ]  GFR Estimate   Date Value Ref Range Status   04/03/2017 41 (L) >60 mL/min/1.7m2 Final     Comment:     Non  GFR Calc   03/20/2017 38 (L) >60 mL/min/1.7m2 Final     Comment:     Non  GFR Calc   03/17/2017 38 (L) >60 mL/min/1.7m2 Final     Comment:     Non  GFR Calc     GFR Estimate If Black   Date Value Ref Range Status   04/03/2017 50 (L) >60 mL/min/1.7m2 Final     Comment:      GFR Calc   03/20/2017 46 (L) >60 mL/min/1.7m2 Final     Comment:      GFR Calc   03/17/2017 46 (L) >60 mL/min/1.7m2 Final     Comment:      GFR Calc     Lab Results   Component Value Date    CHOL 182 04/03/2017     Lab Results   Component Value Date    HDL 48 04/03/2017     Lab Results   Component Value Date     04/03/2017     Lab Results   Component Value Date    TRIG 155 04/03/2017     Lab Results   Component Value Date    CHOLHDLRATIO 3.7 05/06/2015     Lab Results   Component Value Date    AST 14 03/11/2017     Lab Results   Component Value Date    ALT 12 03/11/2017     Lab Results   Component Value Date    A1C 5.1 04/03/2017    A1C 6.1 10/05/2016    A1C 6.3 04/18/2016    A1C 6.2 12/29/2015    A1C 7.6 10/13/2015     Potassium   Date Value Ref Range Status   04/03/2017 4.1 3.4 - 5.3 mmol/L Final        LEVETIRACETAM 1000 MG TABLET    Last Written Prescription Date: 6/27/2017  Last Fill Quantity: 60, # refills: 0  Last Office Visit with [facility],  [facility] or [facility] prescribing provider: 4/03/2017       Lab Results   Component Value Date    ALT 12 03/11/2017     Lab Results   Component Value Date    WBC 5.5 04/03/2017     Lab Results   Component Value Date    RBC 3.38 04/03/2017     Lab Results   Component Value Date    HGB 10.5 04/03/2017     Lab Results   Component Value Date    HCT 33.6 04/03/2017     No components found for: MCT  Lab Results   Component Value Date    MCV 99 04/03/2017     Lab Results   Component Value Date    MCH 31.1 04/03/2017     Lab Results   Component Value Date    MCHC 31.3 04/03/2017     Lab Results   Component Value Date    RDW 14.9 04/03/2017     Lab Results   Component Value Date     04/03/2017     TSH   Date Value Ref Range Status   03/11/2017 1.93 0.40 - 4.00 mU/L Final     Creatinine   Date Value Ref Range Status   04/03/2017 1.61 (H) 0.66 - 1.25 mg/dL Final       Drug Levels  Depakote: No results found for this or any previous visit.  Dilantin: No results found for this or any previous visit.  Gabitril: No results found for this or any previous visit.  Tegretol: No results found for this or any previous visit.  Zonegran: No results found for this or any previous visit.

## 2017-07-06 DIAGNOSIS — E10.10 TYPE I (JUVENILE TYPE) DIABETES MELLITUS WITH KETOACIDOSIS, NOT STATED AS UNCONTROLLED(250.11): ICD-10-CM

## 2017-07-24 DIAGNOSIS — E56.9 VITAMIN DEFICIENCY: ICD-10-CM

## 2017-07-24 DIAGNOSIS — E10.9 TYPE 1 DIABETES, HBA1C GOAL < 7% (H): ICD-10-CM

## 2017-07-24 DIAGNOSIS — K59.00 CONSTIPATION: ICD-10-CM

## 2017-07-27 RX ORDER — DOCUSATE SODIUM 100 MG/1
TABLET ORAL
Qty: 100 CAPSULE | Refills: 1 | Status: SHIPPED | OUTPATIENT
Start: 2017-07-27 | End: 2018-01-22

## 2017-07-27 RX ORDER — UREA 10 %
LOTION (ML) TOPICAL
Qty: 180 TABLET | Refills: 1 | Status: SHIPPED | OUTPATIENT
Start: 2017-07-27 | End: 2018-06-25

## 2017-07-27 RX ORDER — INSULIN ASPART 100 [IU]/ML
INJECTION, SOLUTION INTRAVENOUS; SUBCUTANEOUS
Qty: 9 ML | Refills: 0 | Status: SHIPPED | OUTPATIENT
Start: 2017-07-27 | End: 2017-12-12

## 2017-07-28 ENCOUNTER — HOSPITAL ENCOUNTER (OUTPATIENT)
Facility: HOSPITAL | Age: 82
Setting detail: OBSERVATION
Discharge: HOME | End: 2017-07-31
Attending: PHYSICIAN ASSISTANT | Admitting: INTERNAL MEDICINE
Payer: MEDICARE

## 2017-07-28 ENCOUNTER — APPOINTMENT (OUTPATIENT)
Dept: OCCUPATIONAL THERAPY | Facility: HOSPITAL | Age: 82
End: 2017-07-28
Attending: INTERNAL MEDICINE
Payer: MEDICARE

## 2017-07-28 DIAGNOSIS — M25.551 RIGHT HIP PAIN: ICD-10-CM

## 2017-07-28 DIAGNOSIS — M25.551 HIP PAIN, RIGHT: Primary | ICD-10-CM

## 2017-07-28 DIAGNOSIS — M62.81 GENERALIZED MUSCLE WEAKNESS: ICD-10-CM

## 2017-07-28 LAB
BASOPHILS # BLD AUTO: 0 10E9/L (ref 0–0.2)
BASOPHILS NFR BLD AUTO: 0.4 %
CRP SERPL-MCNC: 11.3 MG/L (ref 0–8)
DIFFERENTIAL METHOD BLD: ABNORMAL
EOSINOPHIL # BLD AUTO: 0.1 10E9/L (ref 0–0.7)
EOSINOPHIL NFR BLD AUTO: 1.6 %
ERYTHROCYTE [DISTWIDTH] IN BLOOD BY AUTOMATED COUNT: 14.9 % (ref 10–15)
ERYTHROCYTE [SEDIMENTATION RATE] IN BLOOD BY WESTERGREN METHOD: 21 MM/H (ref 0–20)
HCT VFR BLD AUTO: 39.4 % (ref 40–53)
HGB BLD-MCNC: 13 G/DL (ref 13.3–17.7)
IMM GRANULOCYTES # BLD: 0 10E9/L (ref 0–0.4)
IMM GRANULOCYTES NFR BLD: 0.3 %
LYMPHOCYTES # BLD AUTO: 1.3 10E9/L (ref 0.8–5.3)
LYMPHOCYTES NFR BLD AUTO: 16.5 %
MCH RBC QN AUTO: 31.3 PG (ref 26.5–33)
MCHC RBC AUTO-ENTMCNC: 33 G/DL (ref 31.5–36.5)
MCV RBC AUTO: 95 FL (ref 78–100)
MONOCYTES # BLD AUTO: 0.8 10E9/L (ref 0–1.3)
MONOCYTES NFR BLD AUTO: 9.8 %
NEUTROPHILS # BLD AUTO: 5.5 10E9/L (ref 1.6–8.3)
NEUTROPHILS NFR BLD AUTO: 71.4 %
NRBC # BLD AUTO: 0 10*3/UL
NRBC BLD AUTO-RTO: 0 /100
PLATELET # BLD AUTO: 149 10E9/L (ref 150–450)
RBC # BLD AUTO: 4.15 10E12/L (ref 4.4–5.9)
WBC # BLD AUTO: 7.7 10E9/L (ref 4–11)

## 2017-07-28 PROCEDURE — 25000132 ZZH RX MED GY IP 250 OP 250 PS 637: Mod: GY | Performed by: PHYSICIAN ASSISTANT

## 2017-07-28 PROCEDURE — 85025 COMPLETE CBC W/AUTO DIFF WBC: CPT | Performed by: PHYSICIAN ASSISTANT

## 2017-07-28 PROCEDURE — 73502 X-RAY EXAM HIP UNI 2-3 VIEWS: CPT | Mod: TC,RT

## 2017-07-28 PROCEDURE — 36415 COLL VENOUS BLD VENIPUNCTURE: CPT | Performed by: PHYSICIAN ASSISTANT

## 2017-07-28 PROCEDURE — G0378 HOSPITAL OBSERVATION PER HR: HCPCS

## 2017-07-28 PROCEDURE — 73700 CT LOWER EXTREMITY W/O DYE: CPT | Mod: TC,RT

## 2017-07-28 PROCEDURE — 86140 C-REACTIVE PROTEIN: CPT | Performed by: PHYSICIAN ASSISTANT

## 2017-07-28 PROCEDURE — 99284 EMERGENCY DEPT VISIT MOD MDM: CPT | Performed by: PHYSICIAN ASSISTANT

## 2017-07-28 PROCEDURE — 99220 ZZC INITIAL OBSERVATION CARE,LEVL III: CPT | Performed by: INTERNAL MEDICINE

## 2017-07-28 PROCEDURE — 99285 EMERGENCY DEPT VISIT HI MDM: CPT | Mod: 25

## 2017-07-28 PROCEDURE — 85652 RBC SED RATE AUTOMATED: CPT | Performed by: PHYSICIAN ASSISTANT

## 2017-07-28 PROCEDURE — A9270 NON-COVERED ITEM OR SERVICE: HCPCS | Mod: GY | Performed by: PHYSICIAN ASSISTANT

## 2017-07-28 RX ORDER — ACETAMINOPHEN 325 MG/1
650 TABLET ORAL ONCE
Status: COMPLETED | OUTPATIENT
Start: 2017-07-28 | End: 2017-07-28

## 2017-07-28 RX ADMIN — ACETAMINOPHEN 650 MG: 325 TABLET, FILM COATED ORAL at 20:20

## 2017-07-28 ASSESSMENT — ENCOUNTER SYMPTOMS
ARTHRALGIAS: 1
CARDIOVASCULAR NEGATIVE: 1
RESPIRATORY NEGATIVE: 1
FEVER: 0
WOUND: 0
PSYCHIATRIC NEGATIVE: 1
COLOR CHANGE: 0

## 2017-07-28 NOTE — IP AVS SNAPSHOT
[facility]    [address] [place] [address]    [place] [address]    Phone:  [phone]    Fax:  [phone]                                       After Visit Summary   7/28/2017    Trey Washington    MRN: [id number]           After Visit Summary Signature Page     I have received my discharge instructions, and my questions have been answered. I have discussed any challenges I see with this plan with the nurse or doctor.    ..........................................................................................................................................  Patient/Patient Representative Signature      ..........................................................................................................................................  Patient Representative Print Name and Relationship to Patient    ..................................................               ................................................  Date                                            Time    ..........................................................................................................................................  Reviewed by Signature/Title    ...................................................              ..............................................  Date                                                            Time

## 2017-07-28 NOTE — ED NOTES
"Pt brought in by [place] [facility]. Pt lives alone but has a PCA during the day. Pt has a history of right hip pain for last 3 weeks that increased today \"I can't even stand on it it hurts too much\". Pt denies any falls. Pt states he is on O2 2lpm at home. Pt sounds SOB with activity (used angela lift to get pt into bed from ambulance cart) but pt denies any change in his breathing, denies any pain with deep breath.  "

## 2017-07-28 NOTE — ED PROVIDER NOTES
History     Chief Complaint   Patient presents with     Hip Pain     c/o rt hip pain, notes difficulty with ambulation     The history is provided by the patient. No  was used.     Trey Washington is a 82 year old male with Hx of DMwho presents with several days of right hip pain that has become severe since this morning. Pain is associates with sensation of hip giving way/weakness, prompting visit this evening. He reports pain to lay on the right hip also. Mr Washington denies any direct trauma. He denies fall. He denies redness, warmth about the hip. 1000mg tylenol today has not helped.     I have reviewed the Medications, Allergies, Past Medical and Surgical History, and Social History in the Epic system.    Allergies as of 07/28/2017 - Edgardo as Reviewed 07/28/2017   Allergen Reaction Noted     Hydrocodone  12/29/2015     Penicillins Swelling 11/25/2013     Current Discharge Medication List      CONTINUE these medications which have NOT CHANGED    Details   METOPROLOL TARTRATE PO Take 25 mg by mouth 2 times daily      NOVOLOG FLEXPEN 100 UNIT/ML soln INJECT 8 UNITS SUBCUTANEOUS BEFORE BEDTIME IN ADDITION TO SLIDING SCALE  Qty: 9 mL, Refills: 0    Associated Diagnoses: Type 1 diabetes, HbA1c goal < 7% (H)      DOCQLACE 100 MG capsule TAKE 1 CAPSULE BY MOUTH TWICE DAILY  Qty: 100 capsule, Refills: 1    Comments: This prescription was filled on 7/24/2017. Any refills authorized will be placed on file.  Associated Diagnoses: Constipation      !! cyanocolbalamin (VITAMIN  B-12) 500 MCG tablet TAKE 2 TABLETS (1,000 MCG) BY MOUTH DAILY  Qty: 180 tablet, Refills: 1    Associated Diagnoses: Vitamin deficiency      LANTUS SOLOSTAR 100 UNIT/ML soln INJECT 18 UNITS SUBCUTANEOUSLY EVERY DAY AT BEDTIME  Qty: 15 mL, Refills: 0    Associated Diagnoses: Type 2 diabetes mellitus without complication (H)      levETIRAcetam 1000 MG TABS TAKE 1 TABLET BY MOUTH TWICE DAILY  Qty: 60 tablet, Refills: 0    Comments:  This prescription was filled on 6/27/2017. Any refills authorized will be placed on file.  Associated Diagnoses: Seizure disorder (H)      !! cyanocolbalamin (VITAMIN  B-12) 500 MCG tablet TAKE 2 TABLETS (1,000 MCG) BY MOUTH DAILY  Qty: 180 tablet, Refills: 0    Associated Diagnoses: Vitamin deficiency      allopurinol (ZYLOPRIM) 300 MG tablet TAKE 1 TABLET BY MOUTH EVERY DAY  Qty: 90 tablet, Refills: 0    Associated Diagnoses: Idiopathic gout, unspecified chronicity, unspecified site      metFORMIN (GLUCOPHAGE) 1000 MG tablet TAKE ONE TABLET BY MOUTH EVERY DAY WITH A MEAL  Qty: 90 tablet, Refills: 0    Associated Diagnoses: Diabetes mellitus, type 2 (H)      terazosin (HYTRIN) 10 MG capsule TAKE 1 CAPSULE BY MOUTH NIGHTLY AT BEDTIME  Qty: 90 capsule, Refills: 0    Comments: This prescription was filled today(4/25/2017). Any refills authorized will be placed on file.  Associated Diagnoses: Essential hypertension      captopril (CAPOTEN) 50 MG tablet Take 2 tablets (100 mg) by mouth 3 times daily  Qty: 270 tablet, Refills: 3    Associated Diagnoses: Essential hypertension      fish oil-omega-3 fatty acids 1000 MG capsule Take 1,000 mg by mouth daily      simvastatin (ZOCOR) 10 MG tablet TAKE 1 TABLET (10 MG) BY MOUTH AT BEDTIME  Qty: 90 tablet, Refills: 1    Associated Diagnoses: Hypercholesterolemia      sertraline (ZOLOFT) 100 MG tablet TAKE 1 TABLET BY MOUTH ONCE DAILY  Qty: 90 tablet, Refills: 3    Associated Diagnoses: Dysthymia      polyethylene glycol (MIRALAX/GLYCOLAX) powder TAKE 17 GRAMS BY MOUTH DAILY MIXED AS DIRECTED.  Qty: 527 g, Refills: 6    Associated Diagnoses: Constipation      Cholecalciferol (VITAMIN D HIGH POTENCY) 1000 UNITS CAPS TAKE 1 CAPSULE BY MOUTH DAILY  Qty: 90 capsule, Refills: 3    Associated Diagnoses: Vitamin D deficiency      Ascorbic Acid (VITAMIN C PO) Take by mouth daily      vitamin E (TOCOPHEROL) 100 UNIT capsule Take 100 Units by mouth daily Uncertain of dose      !! order for  "DME Equipment being ordered: Oxygen 2 LPM per nasal cannula during the day, portable also  Qty: 1 each, Refills: 11    Associated Diagnoses: Hypoxia      calcium carbonate (OS-MARILIN 500 MG [place]) 500 MG tablet Take 1 tablet (500 mg) by mouth 2 times daily  Qty: 180 tablet, Refills: 1    Associated Diagnoses: HTN (hypertension)      UNABLE TO FIND CPAP      aspirin 81 MG tablet Take 1 tablet by mouth daily      NOVOFINE 30G X 8 MM insulin pen needle USE 4 PENS NEEDLES DAILY OR AS DIRECTED  Qty: 100 each, Refills: 6    Comments: This prescription was filled on 7/6/2017. Any refills authorized will be placed on file.  Associated Diagnoses: DM ketoacidosis type I (H)      Lancets Misc. (UNISTIK 3 NORMAL) MISC USE TO TEST BLOOD SUGARS TWO TIMES A DAY  Qty: 100 each, Refills: 2    Associated Diagnoses: Diabetes mellitus, type 2 (H)      Acetaminophen (TYLENOL PO) Take 500 mg by mouth Takes 2 tablets at HS      blood glucose monitoring (KEO CONTOUR MONITOR) meter device kit Use to test blood sugars 2 times daily or as directed.  Qty: 1 kit, Refills: 0    Associated Diagnoses: IDDM (insulin dependent diabetes mellitus) (H)      blood glucose monitoring (KEO CONTOUR) test strip Use to test blood sugars 2 times daily or as directed.  Qty: 200 each, Refills: 3    Associated Diagnoses: IDDM (insulin dependent diabetes mellitus) (H)      blood glucose (KEO MICROLET 2) lancing device Use to test blood sugars 2 times daily or as directed.  Qty: 200 each, Refills: 3    Associated Diagnoses: IDDM (insulin dependent diabetes mellitus) (H)      Silver-Carboxymethylcellulose (AQUACEL-AG EXTRA HYDROFIBER) 4\"X5\" PADS Externally apply 0.25 each topically every other day Use as directed by wound center  Qty: 5 each, Refills: 3    Comments: Ok to substitute any silver hydrofiber/gelling fiber.  Associated Diagnoses: Open wound of abdomen      !! order for DME 4x4 bulk SOFT gauze (disp 1 sleeve, refill x 5)  Qty: 1 Units, Refills: 0 "    Associated Diagnoses: Ulcer of skin (H)      miconazole (MICATIN) 2 % AERP Apply 113 g topically 2 times daily  Qty: 1 each, Refills: 3    Associated Diagnoses: Fungal infection      !! order for DME Equipment being ordered: Meplilex Transfer 6 X 8 inch ([id number]) - disp 3;  Aquacel AG 4 X 5 ([id number]); disp 3 - Refills 6  Qty: 1 each, Refills: 6    Associated Diagnoses: Skin ulcer, limited to breakdown of skin (H)      !! order for DME Equipment being ordered: Wheelchair  Qty: 1 each, Refills: 0    Associated Diagnoses: Morbid obesity, unspecified obesity type (H)      Elastic Bandages & Supports (T.E.D. BELOW KNEE/L-REGULAR) MISC Apply in am and take off in the evening  Qty: 6 each, Refills: 11    Associated Diagnoses: Swelling of limb      ketoconazole (NIZORAL) 2 % cream Apply topically 2 times daily  Qty: 120 g, Refills: 1    Associated Diagnoses: Tinea cruris       !! - Potential duplicate medications found. Please discuss with provider.        Past Medical History:   Diagnosis Date     Chronic kidney disease, stage III (moderate)      Obesity, unspecified      Obstructive sleep apnea (adult) (pediatric)      Osteoarthrosis, unspecified whether generalized or localized, lower leg      Other abnormal blood chemistry     hyperuricemia     Other B-complex deficiencies      Other choreas     Hemiballism     Pure hypercholesterolemia      Skin ulcer (H)      Type II or unspecified type diabetes mellitus without mention of complication, not stated as uncontrolled      Unspecified essential hypertension      Past Surgical History:   Procedure Laterality Date     COLONOSCOPY       ENT SURGERY      lanced abcess in left ear     GENITOURINARY SURGERY      removal of kidney stones     ORTHOPEDIC SURGERY      knee arthroscopy     ORTHOPEDIC SURGERY  2003    bilateral knee replacement     ORTHOPEDIC SURGERY      right knee arthroscopy     PHACOEMULSIFICATION WITH STANDARD INTRAOCULAR LENS IMPLANT  2/11/2014    Procedure:  PHACOEMULSIFICATION WITH STANDARD INTRAOCULAR LENS IMPLANT;  CATARACT EXTRACTION WITH INTRA OCULAR LENS RIGHT;  Surgeon: Luis James MD;  Location: [facility]     PHACOEMULSIFICATION WITH STANDARD INTRAOCULAR LENS IMPLANT  3/11/2014    Procedure: PHACOEMULSIFICATION WITH STANDARD INTRAOCULAR LENS IMPLANT;  CATARACT EXTRACTION WITH INTRA OCULAR LENS LEFT  ;  Surgeon: Luis James MD;  Location: [facility]     TONSILLECTOMY & ADENOIDECTOMY  1942     Family History   Problem Relation Age of Onset     C.A.D. Father      MI     Other - See Comments Father      shell shocked in the war     DIABETES Paternal Grandmother      Social History     Social History     Marital status:      Spouse name: N/A     Number of children: N/A     Years of education: N/A     Occupational History           retired     Social History Main Topics     Smoking status: Former Smoker     Years: 10.00     Types: Cigars     Quit date: 9/29/1987     Smokeless tobacco: Never Used     Alcohol use No     Drug use: No     Sexual activity: No      Comment:      Other Topics Concern     Parent/Sibling W/ Cabg, Mi Or Angioplasty Before 65f 55m? No      Service Yes     [employer]     Blood Transfusions Yes     Permits if needed     Seat Belt Yes     Social History Narrative         Review of Systems   Constitutional: Negative for fever.   Respiratory: Negative.    Cardiovascular: Negative.    Musculoskeletal: Positive for arthralgias.   Skin: Negative for color change, rash and wound.   Psychiatric/Behavioral: Negative.        Physical Exam   BP: (!) 154/110  Heart Rate: 77  Temp: 98.1  F (36.7  C)  Resp: 20  SpO2: 93 %  Physical Exam   Constitutional: He is oriented to person, place, and time. He appears well-developed and well-nourished. No distress.   Cardiovascular: Normal rate.    Pulmonary/Chest: Effort normal.   Musculoskeletal:        Right hip: He exhibits decreased range of motion (passive ROM with internal  rotation of 30 degrees, limited external), decreased strength (due to pain) and tenderness (as indicated). He exhibits no deformity and no laceration.        Legs:  Exam is somewhat difficult due to body habitus   Neurological: He is alert and oriented to person, place, and time.   Skin: Skin is warm and dry.   Psychiatric: He has a normal mood and affect.   Nursing note and vitals reviewed.      ED Course     ED Course     Procedures    Labs Ordered and Resulted from Time of ED Arrival Up to the Time of Departure from the ED   ERYTHROCYTE SEDIMENTATION RATE AUTO - Abnormal; Notable for the following:        Result Value    Sed Rate 21 (*)     All other components within normal limits   CRP INFLAMMATION - Abnormal; Notable for the following:     CRP Inflammation 11.3 (*)     All other components within normal limits   CBC WITH PLATELETS DIFFERENTIAL - Abnormal; Notable for the following:     RBC Count 4.15 (*)     Hemoglobin 13.0 (*)     Hematocrit 39.4 (*)     Platelet Count 149 (*)     All other components within normal limits     CT is ordered due to abnormal appearing cortex of femoral head. CT is negative for fracture.     Medications   acetaminophen (TYLENOL) tablet 650 mg (650 mg Oral Given 7/28/17 2020)   Patient tolerated.     Assessments & Plan (with Medical Decision Making)     I have reviewed the nursing notes.  I have reviewed the findings, diagnosis, plan and need for follow up with the patient.  CT negative for Fx. Pt prefers tylenol and to return home if possible, getting up his stairs being the biggest challenge. He is unfortunately unable to take 2 steps upon rising from the bed with the assistance of 3 nursing staff.  Dr Sutton is consulted and Mr Washington will be admitted for observation, PT, possible skilled care.     Current Discharge Medication List          Final diagnoses:   Hip pain, right       7/28/2017   [facility]     Erasmo Hughes, PA  07/28/17 2179

## 2017-07-28 NOTE — ED NOTES
Pt states his POA is Adri [facility] [phone] and pt gives premission for us to contact her and able to give updates/health information to.

## 2017-07-28 NOTE — IP AVS SNAPSHOT
MRN:[id number]                      After Visit Summary   7/28/2017    Trey Washington    MRN: [id number]           Thank you!     Thank you for choosing [facility] for your care. Our goal is always to provide you with excellent care. Hearing back from our patients is one way we can continue to improve our services. Please take a few minutes to complete the written survey that you may receive in the mail after you visit with us. Thank you!        Patient Information     Date Of Birth          1935        Designated Caregiver       Most Recent Value    Caregiver    Will someone help with your care after discharge? yes    Name of designated caregiver Parth    Phone number of caregiver see POA    Caregiver address see POA      About your hospital stay     You were admitted on:  July 28, 2017 You last received care in the:  [facility]    You were discharged on:  July 31, 2017        Reason for your hospital stay       Right hip pain                  Who to Call     For medical emergencies, please call 911.  For non-urgent questions about your medical care, please call your primary care provider or clinic, [phone]          Attending Provider     Provider Specialty    Erasmo Hughes PA Physician Assistant - [facility]    Alayna Sánchez MD --    Avi Desouza MD Internal Medicine    Veronica Hood NP Nurse Practitioner       Primary Care Provider Office Phone # Fax #    Moreno Desai [phone]      After Care Instructions     Activity       Your activity upon discharge: activity as tolerated            Diet       Follow this diet upon discharge: Orders Placed This Encounter      Moderate Consistent CHO Diet            Discharge Instructions       Continue home o2 as previously prescribed. Continue CPAP while sleeping.                  Follow-up Appointments     Follow-up and recommended labs and tests        Follow up with primary care provider, Moreno ANDERSEN  Lloyd, within 7 days for hospital follow- up.  No follow up labs or test are needed.                  Your next 10 appointments already scheduled     Aug 07, 2017 11:00 AM CDT   (Arrive by 10:45 AM)   SHORT with Moreno Figueroa MD   [facility] ([facility] )    [address]  [facility] [address]   [phone]              Additional Services     Home Care PT Referral for Hospital Discharge       PT to eval and treat    Your provider has ordered home care - physical therapy. If you have not been contacted within 2 days of your discharge please call the department phone number listed on the top of this document.                  Further instructions from your care team       YOU HAVE A FOLLOW-UP APPOINTMENT WITH DR. FIGUEROA ON Monday 8/7/17 AT 10:45AM.     Pending Results     Date and Time Order Name Status Description    7/28/2017 0000 CT IMAGING - HIM SCAN Preliminary             Statement of Approval     Ordered          07/31/17 1620  I have reviewed and agree with all the recommendations and orders detailed in this document.  EFFECTIVE NOW     Approved and electronically signed by:  Avi Desouza MD           07/31/17 8835  I have reviewed and agree with all the recommendations and orders detailed in this document.  EFFECTIVE NOW     Approved and electronically signed by:  Veronica Hood NP             Admission Information     Date & Time Provider Department Dept. Phone    7/28/2017 Veronica Hood NP [facility] [phone]      Your Vitals Were     Blood Pressure Pulse Temperature Respirations Weight Pulse Oximetry    182/67 71 97  F (36.1  C) (Oral) 20 170.1 kg (375 lb) 95%    BMI (Body Mass Index)                   53.81 kg/m2           [employer] Information     [employer] gives you secure access to your electronic health record. If you see a primary care provider, you can also send messages to your care team and make appointments. If you have  "questions, please call your primary care clinic.  If you do not have a primary care provider, please call [phone] and they will assist you.        Care EveryWhere ID     This is your Care EveryWhere ID. This could be used by other organizations to access your [facility] medical records  [id number]        Equal Access to Services     NATHALYRENE PAMELA : Hadleeroy milian hadzaidao Soomaali, waaxda luqadaha, qaybta kaalmada leosureshgricelda, de gossjosecon martínez. So [facility] [phone].    ATENCIÓN: Si habla español, tiene a cabrera disposición servicios gratuitos de asistencia lingüística. Dinora al [phone].    We comply with applicable federal civil rights laws and [place] laws. We do not discriminate on the basis of race, color, national origin, age, disability sex, sexual orientation or gender identity.               Review of your medicines      UNREVIEWED medicines. Ask your doctor about these medicines        Dose / Directions    allopurinol 300 MG tablet   Commonly known as:  ZYLOPRIM   Used for:  Idiopathic gout, unspecified chronicity, unspecified site        TAKE 1 TABLET BY MOUTH EVERY DAY   Quantity:  90 tablet   Refills:  0       AQUACEL-AG EXTRA HYDROFIBER 4\"X5\" Pads   Used for:  Open wound of abdomen        Dose:  0.25 each   Externally apply 0.25 each topically every other day Use as directed by wound center   Quantity:  5 each   Refills:  3       calcium carbonate 1250 MG tablet   Commonly known as:  OS-MARILIN 500 mg [place]. [place]   Used for:  HTN (hypertension)        Dose:  500 mg   Take 1 tablet (500 mg) by mouth 2 times daily   Quantity:  180 tablet   Refills:  1       * cyanocolbalamin 500 MCG tablet   Commonly known as:  vitamin  B-12   Used for:  Vitamin deficiency        TAKE 2 TABLETS (1,000 MCG) BY MOUTH DAILY   Quantity:  180 tablet   Refills:  0       * cyanocolbalamin 500 MCG tablet   Commonly known as:  vitamin  B-12   Used for:  Vitamin deficiency        TAKE 2 TABLETS (1,000 MCG) BY " MOUTH DAILY   Quantity:  180 tablet   Refills:  1       DOCQLACE 100 MG capsule   Used for:  Constipation   Generic drug:  docusate sodium        TAKE 1 CAPSULE BY MOUTH TWICE DAILY   Quantity:  100 capsule   Refills:  1       fish oil-omega-3 fatty acids 1000 MG capsule        Dose:  1000 mg   Take 1,000 mg by mouth daily   Refills:  0       ketoconazole 2 % cream   Commonly known as:  NIZORAL   Used for:  Tinea cruris        Apply topically 2 times daily   Quantity:  120 g   Refills:  1       METOPROLOL TARTRATE PO        Dose:  25 mg   Take 25 mg by mouth 2 times daily   Refills:  0       miconazole 2 % Aerp powder   Commonly known as:  MICATIN   Used for:  Fungal infection        Dose:  113 g   Apply 113 g topically 2 times daily   Quantity:  1 each   Refills:  3       polyethylene glycol powder   Commonly known as:  MIRALAX/GLYCOLAX   Used for:  Constipation        TAKE 17 GRAMS BY MOUTH DAILY MIXED AS DIRECTED.   Quantity:  527 g   Refills:  6       terazosin 10 MG capsule   Commonly known as:  HYTRIN   Used for:  Essential hypertension        TAKE 1 CAPSULE BY MOUTH NIGHTLY AT BEDTIME   Quantity:  90 capsule   Refills:  0       UNABLE TO FIND        CPAP   Refills:  0       VITAMIN C PO        Take by mouth daily   Refills:  0       VITAMIN D HIGH POTENCY 1000 UNITS Caps   Used for:  Vitamin D deficiency        TAKE 1 CAPSULE BY MOUTH DAILY   Quantity:  90 capsule   Refills:  3       vitamin E 100 UNIT capsule   Commonly known as:  TOCOPHEROL        Dose:  100 Units   Take 100 Units by mouth daily Uncertain of dose   Refills:  0       * Notice:  This list has 2 medication(s) that are the same as other medications prescribed for you. Read the directions carefully, and ask your doctor or other care provider to review them with you.      START taking        Dose / Directions    traMADol 50 MG tablet   Commonly known as:  ULTRAM        Dose:  50 mg   Take 1 tablet (50 mg) by mouth every 6 hours as needed for  moderate pain   Quantity:  20 tablet   Refills:  0         CONTINUE these medicines which have NOT CHANGED        Dose / Directions    aspirin 81 MG tablet        Dose:  1 tablet   Take 1 tablet by mouth daily   Refills:  0       blood glucose lancing device   Used for:  IDDM (insulin dependent diabetes mellitus) (H)        Use to test blood sugars 2 times daily or as directed.   Quantity:  200 each   Refills:  3       blood glucose monitoring meter device kit   Used for:  IDDM (insulin dependent diabetes mellitus) (H)        Use to test blood sugars 2 times daily or as directed.   Quantity:  1 kit   Refills:  0       blood glucose monitoring test strip   Commonly known as:  KEO CONTOUR   Used for:  IDDM (insulin dependent diabetes mellitus) (H)        Use to test blood sugars 2 times daily or as directed.   Quantity:  200 each   Refills:  3       captopril 50 MG tablet   Commonly known as:  CAPOTEN   Used for:  Essential hypertension        Dose:  100 mg   Take 2 tablets (100 mg) by mouth 3 times daily   Quantity:  270 tablet   Refills:  3       LANTUS SOLOSTAR 100 UNIT/ML injection   Used for:  Type 2 diabetes mellitus without complication (H)   Generic drug:  insulin glargine        INJECT 18 UNITS SUBCUTANEOUSLY EVERY DAY AT BEDTIME   Quantity:  15 mL   Refills:  0       levETIRAcetam 1000 MG Tabs   Used for:  Seizure disorder (H)        TAKE 1 TABLET BY MOUTH TWICE DAILY   Quantity:  60 tablet   Refills:  0       metFORMIN 1000 MG tablet   Commonly known as:  GLUCOPHAGE   Used for:  Diabetes mellitus, type 2 (H)        TAKE ONE TABLET BY MOUTH EVERY DAY WITH A MEAL   Quantity:  90 tablet   Refills:  0       NOVOFINE 30G X 8 MM   Used for:  DM ketoacidosis type I (H)   Generic drug:  insulin pen needle        USE 4 PENS NEEDLES DAILY OR AS DIRECTED   Quantity:  100 each   Refills:  6       NovoLOG FLEXPEN 100 UNIT/ML injection   Used for:  Type 1 diabetes, HbA1c goal < 7% (H)   Generic drug:  insulin aspart         INJECT 8 UNITS SUBCUTANEOUS BEFORE BEDTIME IN ADDITION TO SLIDING SCALE   Quantity:  9 mL   Refills:  0       * order for DME   Used for:  Hypoxia        Equipment being ordered: Oxygen 2 LPM per nasal cannula during the day, portable also   Quantity:  1 each   Refills:  11       * order for DME   Used for:  Morbid obesity, unspecified obesity type (H)        Equipment being ordered: Wheelchair   Quantity:  1 each   Refills:  0       * order for DME   Used for:  Skin ulcer, limited to breakdown of skin (H)        Equipment being ordered: Meplilex Transfer 6 X 8 inch ([id number]) - disp 3;  Aquacel AG 4 X 5 ([id number]); disp 3 - Refills 6   Quantity:  1 each   Refills:  6       order for DME   Used for:  Ulcer of skin (H)        4x4 bulk SOFT gauze (disp 1 sleeve, refill x 5)   Quantity:  1 Units   Refills:  0       sertraline 100 MG tablet   Commonly known as:  ZOLOFT   Used for:  Dysthymia        TAKE 1 TABLET BY MOUTH ONCE DAILY   Quantity:  90 tablet   Refills:  3       simvastatin 10 MG tablet   Commonly known as:  ZOCOR   Used for:  Hypercholesterolemia        TAKE 1 TABLET (10 MG) BY MOUTH AT BEDTIME   Quantity:  90 tablet   Refills:  1       T.E.D. BELOW KNEE/L-REGULAR Misc   Used for:  Swelling of limb        Apply in am and take off in the evening   Quantity:  6 each   Refills:  11       TYLENOL PO        Dose:  500 mg   Take 500 mg by mouth Takes 2 tablets at HS   Refills:  0       UNISTIK 3 NORMAL Misc   Used for:  Diabetes mellitus, type 2 (H)        USE TO TEST BLOOD SUGARS TWO TIMES A DAY   Quantity:  100 each   Refills:  2       * Notice:  This list has 3 medication(s) that are the same as other medications prescribed for you. Read the directions carefully, and ask your doctor or other care provider to review them with you.         Where to get your medicines      Some of these will need a paper prescription and others can be bought over the counter. Ask your nurse if you have questions.     Bring a  "paper prescription for each of these medications     traMADol 50 MG tablet                Protect others around you: Learn how to safely use, store and throw away your medicines at www.disposemymeds.org.             Medication List: This is a list of all your medications and when to take them. Check marks below indicate your daily home schedule. Keep this list as a reference.      Medications           Morning Afternoon Evening Bedtime As Needed    allopurinol 300 MG tablet   Commonly known as:  ZYLOPRIM   TAKE 1 TABLET BY MOUTH EVERY DAY   Last time this was given:  300 mg on 7/31/2017  9:17 AM                                [facility]- EXTRA HYDROFIBER 4\"X5\" Pads   Externally apply 0.25 each topically every other day Use as directed by wound center                                aspirin 81 MG tablet   Take 1 tablet by mouth daily                                blood glucose lancing device   Use to test blood sugars 2 times daily or as directed.                                blood glucose monitoring meter device kit   Use to test blood sugars 2 times daily or as directed.                                blood glucose monitoring test strip   Commonly known as:  KEO CONTOUR   Use to test blood sugars 2 times daily or as directed.                                calcium carbonate 1250 MG tablet   Commonly known as:  OS-MARILIN 500 mg [place]. [place]   Take 1 tablet (500 mg) by mouth 2 times daily                                captopril 50 MG tablet   Commonly known as:  CAPOTEN   Take 2 tablets (100 mg) by mouth 3 times daily   Last time this was given:  100 mg on 7/31/2017  1:23 PM                                * cyanocolbalamin 500 MCG tablet   Commonly known as:  vitamin  B-12   TAKE 2 TABLETS (1,000 MCG) BY MOUTH DAILY                                * cyanocolbalamin 500 MCG tablet   Commonly known as:  vitamin  B-12   TAKE 2 TABLETS (1,000 MCG) BY MOUTH DAILY                                DOCQLACE 100 MG capsule   TAKE 1 " CAPSULE BY MOUTH TWICE DAILY   Last time this was given:  100 mg on 7/31/2017  9:16 AM   Generic drug:  docusate sodium                                fish oil-omega-3 fatty acids 1000 MG capsule   Take 1,000 mg by mouth daily                                ketoconazole 2 % cream   Commonly known as:  NIZORAL   Apply topically 2 times daily                                LANTUS SOLOSTAR 100 UNIT/ML injection   INJECT 18 UNITS SUBCUTANEOUSLY EVERY DAY AT BEDTIME   Last time this was given:  18 Units on 7/30/2017  9:52 PM   Generic drug:  insulin glargine                                levETIRAcetam 1000 MG Tabs   TAKE 1 TABLET BY MOUTH TWICE DAILY   Last time this was given:  1,000 mg on 7/31/2017  9:17 AM                                metFORMIN 1000 MG tablet   Commonly known as:  GLUCOPHAGE   TAKE ONE TABLET BY MOUTH EVERY DAY WITH A MEAL   Last time this was given:  500 mg on 7/31/2017  9:16 AM                                METOPROLOL TARTRATE PO   Take 25 mg by mouth 2 times daily   Last time this was given:  25 mg on 7/31/2017  9:17 AM                                miconazole 2 % Aerp powder   Commonly known as:  MICATIN   Apply 113 g topically 2 times daily                                NOVOFINE 30G X 8 MM   USE 4 PENS NEEDLES DAILY OR AS DIRECTED   Generic drug:  insulin pen needle                                NovoLOG FLEXPEN 100 UNIT/ML injection   INJECT 8 UNITS SUBCUTANEOUS BEFORE BEDTIME IN ADDITION TO SLIDING SCALE   Last time this was given:  8 Units on 7/31/2017  9:17 AM   Generic drug:  insulin aspart                                * order for DME   Equipment being ordered: Oxygen 2 LPM per nasal cannula during the day, portable also                                * order for DME   Equipment being ordered: Wheelchair                                * order for DME   Equipment being ordered: Meplilex Transfer 6 X 8 inch ([id number]) - disp 3;  Aquacel AG 4 X 5 ([id number]); disp 3 - Refills 6                                 order for DME   4x4 bulk SOFT gauze (disp 1 sleeve, refill x 5)                                polyethylene glycol powder   Commonly known as:  MIRALAX/GLYCOLAX   TAKE 17 GRAMS BY MOUTH DAILY MIXED AS DIRECTED.                                sertraline 100 MG tablet   Commonly known as:  ZOLOFT   TAKE 1 TABLET BY MOUTH ONCE DAILY   Last time this was given:  100 mg on 7/31/2017  9:17 AM                                simvastatin 10 MG tablet   Commonly known as:  ZOCOR   TAKE 1 TABLET (10 MG) BY MOUTH AT BEDTIME   Last time this was given:  10 mg on 7/30/2017  9:50 PM                                T.E.D. BELOW KNEE/L-REGULAR Misc   Apply in am and take off in the evening                                terazosin 10 MG capsule   Commonly known as:  HYTRIN   TAKE 1 CAPSULE BY MOUTH NIGHTLY AT BEDTIME   Last time this was given:  2 mg on 7/30/2017  9:50 PM                                traMADol 50 MG tablet   Commonly known as:  ULTRAM   Take 1 tablet (50 mg) by mouth every 6 hours as needed for moderate pain   Last time this was given:  50 mg on 7/31/2017 10:07 AM                                TYLENOL PO   Take 500 mg by mouth Takes 2 tablets at HS   Last time this was given:  975 mg on 7/29/2017 12:50 AM                                UNABLE TO FIND   CPAP                                UNISTIK 3 NORMAL Misc   USE TO TEST BLOOD SUGARS TWO TIMES A DAY                                VITAMIN C PO   Take by mouth daily                                VITAMIN D HIGH POTENCY 1000 UNITS Caps   TAKE 1 CAPSULE BY MOUTH DAILY                                vitamin E 100 UNIT capsule   Commonly known as:  TOCOPHEROL   Take 100 Units by mouth daily Uncertain of dose                                * Notice:  This list has 5 medication(s) that are the same as other medications prescribed for you. Read the directions carefully, and ask your doctor or other care provider to review them with you.              More  Information        Communicating About Pain    You have a right to have your pain evaluated and treated as effectively as possible. Untreated pain can limit eating, sleeping, and activity. Tell your health care provider where and how much you hurt. It may not be possible to relieve all the pain. But your health care provider can help you reach a pain level you can live with.  Your role  Tell your health care provider about the pain and your health problems. Be sure to:    Mention all the medicines you take. This includes any you buy over-the-counter and any herbs, teas, or vitamins you take.    Mention any pain relief techniques you use, like massage or meditation.    Measure pain as directed by your health care provider.    If your health care provider asks you to, keep a diary of your pain, the treatments you are using and how well they work. You may also be asked to describe how strong your pain is on a scale of zero to 10. Zero is no pain and 10 is the worst pain you can imagine. Be prepared to do this for your health care provider.    Follow your treatment plan as directed by your health care provider. Tell your health care provider how your treatment is working.  As pain is reduced, you ll feel better. Less pain means less stress on your body and mind.  Your health care provider s role  Your health care provider will help you understand and manage pain. You will be told about your pain control options. These will most likely include medicines. Options like physical therapy and acupuncture may also help.  Note for family and friends  It may be hard to understand how your loved one feels. But the pain he or she has is real. You may not be able to stop the pain. You can help in other ways, though. Spend time with your loved one. This helps distract from the pain. And help him or her take medicines on time and in the correct amount.   Date Last Reviewed: 6/11/2015 2000-2017 [employer] [address]  [place] [address]. All rights reserved. This information is not intended as a substitute for professional medical care. Always follow your healthcare professional's instructions.

## 2017-07-29 ENCOUNTER — APPOINTMENT (OUTPATIENT)
Dept: PHYSICAL THERAPY | Facility: HOSPITAL | Age: 82
End: 2017-07-29
Attending: INTERNAL MEDICINE
Payer: MEDICARE

## 2017-07-29 PROBLEM — M25.551 RIGHT HIP PAIN: Status: ACTIVE | Noted: 2017-07-29

## 2017-07-29 LAB
CREAT SERPL-MCNC: 1.59 MG/DL (ref 0.66–1.25)
GFR SERPL CREATININE-BSD FRML MDRD: 42 ML/MIN/1.7M2
GLUCOSE BLDC GLUCOMTR-MCNC: 100 MG/DL (ref 70–99)
GLUCOSE BLDC GLUCOMTR-MCNC: 112 MG/DL (ref 70–99)
GLUCOSE BLDC GLUCOMTR-MCNC: 112 MG/DL (ref 70–99)
GLUCOSE BLDC GLUCOMTR-MCNC: 114 MG/DL (ref 70–99)

## 2017-07-29 PROCEDURE — G0378 HOSPITAL OBSERVATION PER HR: HCPCS

## 2017-07-29 PROCEDURE — 25000131 ZZH RX MED GY IP 250 OP 636 PS 637: Mod: GY | Performed by: INTERNAL MEDICINE

## 2017-07-29 PROCEDURE — 00000146 ZZHCL STATISTIC GLUCOSE BY METER IP

## 2017-07-29 PROCEDURE — 40000193 ZZH STATISTIC PT WARD VISIT

## 2017-07-29 PROCEDURE — 40000133 ZZH STATISTIC OT WARD VISIT

## 2017-07-29 PROCEDURE — 25000132 ZZH RX MED GY IP 250 OP 250 PS 637: Mod: GY | Performed by: INTERNAL MEDICINE

## 2017-07-29 PROCEDURE — 25000131 ZZH RX MED GY IP 250 OP 636 PS 637: Mod: GY

## 2017-07-29 PROCEDURE — 97166 OT EVAL MOD COMPLEX 45 MIN: CPT | Mod: GO

## 2017-07-29 PROCEDURE — 99225 ZZC SUBSEQUENT OBSERVATION CARE,LEVEL II: CPT | Performed by: INTERNAL MEDICINE

## 2017-07-29 PROCEDURE — A9270 NON-COVERED ITEM OR SERVICE: HCPCS | Mod: GY | Performed by: INTERNAL MEDICINE

## 2017-07-29 PROCEDURE — 40000786 ZZHCL STATISTIC ACTIVE MRSA SURVEILLANCE CULTURE: Performed by: INTERNAL MEDICINE

## 2017-07-29 PROCEDURE — 36415 COLL VENOUS BLD VENIPUNCTURE: CPT | Performed by: INTERNAL MEDICINE

## 2017-07-29 PROCEDURE — 82565 ASSAY OF CREATININE: CPT | Performed by: INTERNAL MEDICINE

## 2017-07-29 PROCEDURE — 97161 PT EVAL LOW COMPLEX 20 MIN: CPT | Mod: GP

## 2017-07-29 RX ORDER — CAPTOPRIL 25 MG/1
100 TABLET ORAL 3 TIMES DAILY
Status: DISCONTINUED | OUTPATIENT
Start: 2017-07-29 | End: 2017-07-31 | Stop reason: HOSPADM

## 2017-07-29 RX ORDER — DEXTROSE MONOHYDRATE 25 G/50ML
25-50 INJECTION, SOLUTION INTRAVENOUS
Status: DISCONTINUED | OUTPATIENT
Start: 2017-07-29 | End: 2017-07-31 | Stop reason: HOSPADM

## 2017-07-29 RX ORDER — ONDANSETRON 4 MG/1
4 TABLET, ORALLY DISINTEGRATING ORAL EVERY 6 HOURS PRN
Status: DISCONTINUED | OUTPATIENT
Start: 2017-07-29 | End: 2017-07-31 | Stop reason: HOSPADM

## 2017-07-29 RX ORDER — ONDANSETRON 2 MG/ML
4 INJECTION INTRAMUSCULAR; INTRAVENOUS EVERY 6 HOURS PRN
Status: DISCONTINUED | OUTPATIENT
Start: 2017-07-29 | End: 2017-07-31 | Stop reason: HOSPADM

## 2017-07-29 RX ORDER — SERTRALINE HYDROCHLORIDE 100 MG/1
100 TABLET, FILM COATED ORAL DAILY
Status: DISCONTINUED | OUTPATIENT
Start: 2017-07-29 | End: 2017-07-31 | Stop reason: HOSPADM

## 2017-07-29 RX ORDER — NICOTINE POLACRILEX 4 MG
15-30 LOZENGE BUCCAL
Status: DISCONTINUED | OUTPATIENT
Start: 2017-07-29 | End: 2017-07-31 | Stop reason: HOSPADM

## 2017-07-29 RX ORDER — MORPHINE SULFATE 2 MG/ML
1 INJECTION, SOLUTION INTRAMUSCULAR; INTRAVENOUS
Status: DISCONTINUED | OUTPATIENT
Start: 2017-07-29 | End: 2017-07-31 | Stop reason: HOSPADM

## 2017-07-29 RX ORDER — SIMVASTATIN 10 MG
10 TABLET ORAL AT BEDTIME
Status: DISCONTINUED | OUTPATIENT
Start: 2017-07-29 | End: 2017-07-31 | Stop reason: HOSPADM

## 2017-07-29 RX ORDER — NALOXONE HYDROCHLORIDE 0.4 MG/ML
.1-.4 INJECTION, SOLUTION INTRAMUSCULAR; INTRAVENOUS; SUBCUTANEOUS
Status: DISCONTINUED | OUTPATIENT
Start: 2017-07-29 | End: 2017-07-31 | Stop reason: HOSPADM

## 2017-07-29 RX ORDER — ALLOPURINOL 300 MG/1
300 TABLET ORAL DAILY
Status: DISCONTINUED | OUTPATIENT
Start: 2017-07-29 | End: 2017-07-31 | Stop reason: HOSPADM

## 2017-07-29 RX ORDER — METOPROLOL TARTRATE 25 MG/1
25 TABLET, FILM COATED ORAL 2 TIMES DAILY
Status: DISCONTINUED | OUTPATIENT
Start: 2017-07-29 | End: 2017-07-31 | Stop reason: HOSPADM

## 2017-07-29 RX ORDER — TERAZOSIN 2 MG/1
2 CAPSULE ORAL AT BEDTIME
Status: DISCONTINUED | OUTPATIENT
Start: 2017-07-29 | End: 2017-07-31 | Stop reason: HOSPADM

## 2017-07-29 RX ORDER — POLYETHYLENE GLYCOL 3350 17 G/17G
17 POWDER, FOR SOLUTION ORAL DAILY
Status: DISCONTINUED | OUTPATIENT
Start: 2017-07-29 | End: 2017-07-31 | Stop reason: HOSPADM

## 2017-07-29 RX ORDER — DOCUSATE SODIUM 100 MG/1
100 CAPSULE, LIQUID FILLED ORAL 2 TIMES DAILY
Status: DISCONTINUED | OUTPATIENT
Start: 2017-07-29 | End: 2017-07-31 | Stop reason: HOSPADM

## 2017-07-29 RX ORDER — CELECOXIB 50 MG/1
50 CAPSULE ORAL 2 TIMES DAILY
Status: DISCONTINUED | OUTPATIENT
Start: 2017-07-29 | End: 2017-07-31 | Stop reason: HOSPADM

## 2017-07-29 RX ORDER — ASPIRIN 81 MG/1
81 TABLET ORAL DAILY
Status: DISCONTINUED | OUTPATIENT
Start: 2017-07-29 | End: 2017-07-31 | Stop reason: HOSPADM

## 2017-07-29 RX ORDER — ACETAMINOPHEN 325 MG/1
975 TABLET ORAL
Status: DISCONTINUED | OUTPATIENT
Start: 2017-07-29 | End: 2017-07-31 | Stop reason: HOSPADM

## 2017-07-29 RX ORDER — LEVETIRACETAM 500 MG/1
1000 TABLET ORAL 2 TIMES DAILY
Status: DISCONTINUED | OUTPATIENT
Start: 2017-07-29 | End: 2017-07-31 | Stop reason: HOSPADM

## 2017-07-29 RX ADMIN — CAPTOPRIL 100 MG: 25 TABLET ORAL at 09:04

## 2017-07-29 RX ADMIN — MICONAZOLE NITRATE: 2 POWDER TOPICAL at 11:06

## 2017-07-29 RX ADMIN — CAPTOPRIL 100 MG: 25 TABLET ORAL at 21:25

## 2017-07-29 RX ADMIN — ASPIRIN 81 MG: 81 TABLET, COATED ORAL at 09:04

## 2017-07-29 RX ADMIN — CELECOXIB 50 MG: 50 CAPSULE ORAL at 11:04

## 2017-07-29 RX ADMIN — METFORMIN HYDROCHLORIDE 500 MG: 500 TABLET ORAL at 17:19

## 2017-07-29 RX ADMIN — CAPTOPRIL 100 MG: 25 TABLET ORAL at 14:44

## 2017-07-29 RX ADMIN — POLYETHYLENE GLYCOL 3350 17 G: 17 POWDER, FOR SOLUTION ORAL at 09:04

## 2017-07-29 RX ADMIN — DOCUSATE SODIUM 100 MG: 100 CAPSULE, LIQUID FILLED ORAL at 09:05

## 2017-07-29 RX ADMIN — METOPROLOL TARTRATE 25 MG: 25 TABLET ORAL at 00:50

## 2017-07-29 RX ADMIN — TERAZOSIN HYDROCHLORIDE 2 MG: 2 CAPSULE ORAL at 21:25

## 2017-07-29 RX ADMIN — INSULIN ASPART 8 UNITS: 100 INJECTION, SOLUTION INTRAVENOUS; SUBCUTANEOUS at 09:44

## 2017-07-29 RX ADMIN — SERTRALINE HYDROCHLORIDE 100 MG: 100 TABLET ORAL at 09:05

## 2017-07-29 RX ADMIN — DOCUSATE SODIUM 100 MG: 100 CAPSULE, LIQUID FILLED ORAL at 21:25

## 2017-07-29 RX ADMIN — METFORMIN HYDROCHLORIDE 500 MG: 500 TABLET ORAL at 09:05

## 2017-07-29 RX ADMIN — LEVETIRACETAM 1000 MG: 500 TABLET ORAL at 00:50

## 2017-07-29 RX ADMIN — MICONAZOLE NITRATE: 2 POWDER TOPICAL at 22:00

## 2017-07-29 RX ADMIN — INSULIN GLARGINE 18 UNITS: 100 INJECTION, SOLUTION SUBCUTANEOUS at 00:50

## 2017-07-29 RX ADMIN — METOPROLOL TARTRATE 25 MG: 25 TABLET ORAL at 21:25

## 2017-07-29 RX ADMIN — ACETAMINOPHEN 975 MG: 325 TABLET, FILM COATED ORAL at 00:50

## 2017-07-29 RX ADMIN — LEVETIRACETAM 1000 MG: 500 TABLET ORAL at 21:25

## 2017-07-29 RX ADMIN — LEVETIRACETAM 1000 MG: 500 TABLET ORAL at 09:05

## 2017-07-29 RX ADMIN — SIMVASTATIN 10 MG: 10 TABLET, FILM COATED ORAL at 00:50

## 2017-07-29 RX ADMIN — SIMVASTATIN 10 MG: 10 TABLET, FILM COATED ORAL at 21:25

## 2017-07-29 RX ADMIN — METOPROLOL TARTRATE 25 MG: 25 TABLET ORAL at 09:05

## 2017-07-29 RX ADMIN — ALLOPURINOL 300 MG: 300 TABLET ORAL at 09:05

## 2017-07-29 ASSESSMENT — ACTIVITIES OF DAILY LIVING (ADL)
NUMBER_OF_TIMES_PATIENT_HAS_FALLEN_WITHIN_LAST_SIX_MONTHS: 1
TRANSFERRING: 1-->ASSISTIVE EQUIPMENT
PREVIOUS_RESPONSIBILITIES: FINANCES
DRESS: 2-->ASSISTIVE PERSON
SWALLOWING: 0-->SWALLOWS FOODS/LIQUIDS WITHOUT DIFFICULTY
COGNITION: 0 - NO COGNITION ISSUES REPORTED
RETIRED_EATING: 0-->INDEPENDENT
BATHING: 2-->ASSISTIVE PERSON
RETIRED_COMMUNICATION: 0-->UNDERSTANDS/COMMUNICATES WITHOUT DIFFICULTY
AMBULATION: 1-->ASSISTIVE EQUIPMENT
WHICH_OF_THE_ABOVE_FUNCTIONAL_RISKS_HAD_A_RECENT_ONSET_OR_CHANGE?: AMBULATION
TOILETING: 2-->ASSISTIVE PERSON
FALL_HISTORY_WITHIN_LAST_SIX_MONTHS: YES

## 2017-07-29 ASSESSMENT — PAIN DESCRIPTION - DESCRIPTORS: DESCRIPTORS: CONSTANT

## 2017-07-29 NOTE — H&P
[facility]    History and Physical  Hospitalist       Date of Admission:  7/28/2017    Assessment & Plan   Mr Trey Washington is a 82 year old  man who presents with Acute right hip pain.    Principal problem:  #1 acute hip pain  No evidence of fracture noticed despite extensive imaging in the emergency room. We will admit the patient to the hospital for pain control and I'm going to consult PT and OT. As this is an observation admission only I doubt that he will be able to go to a skilled nursing facility however he might be a good candidate for acute inpatient rehabilitation.    Active Problems:  #2 type 2 diabetes mellitus  We will continue patient on Lantus insulin and NovoLog insulin in addition to sliding scale.    #3 obstructive sleep apnea and need for CPAP  Patient has to wear CPAP/BiPAP whenever patient is asleep whether day or night.  I asked patient/family members to bring the device from home.  We will use that as per home settings unless clinical condition deteriorates.    #4 chronic kidney disease stage III  We will avoid all nephrotoxic agents, hypotension, intravenous contrast, NSAIDs. We will closely monitor patient's ins and outs and repeat renal panel in the morning.    #5 dyslipidemia, hypertension,, morbid obesity, gout, vitamin B12 deficiency  Stable. Continue home medications. Continue to monitor.    #6 obesity hypoventilation syndrome, possible COPD  Patient is on 3-1/2 L/m of oxygen at baseline. We will continue with supplemental oxygen.    DVT Prophylaxis: Heparin SQ  Code Status: DNR / DNI    Disposition: This is an observation admission only.  I anticipate that the patient will be able to go home or to acute inpatient rehabilitation in the next 24-48 hours.    Alayna Sánchez MD, FACP.    Primary Care Physician   Moreno Desai    Chief Complaint   Right hip pain    History is obtained from patient and ER records, past medical records.    History of Present  Illness   Mr Trey Washington is a pleasant 82 year old  man who presents with Acute right hip pain.    As per ER records:  Trey Washington is a 82 year old male with Hx of DMwho presents with several days of right hip pain that has become severe since this morning. Pain is associates with sensation of hip giving way/weakness, prompting visit this evening. He reports pain to lay on the right hip also. Mr Washington denies any direct trauma. He denies fall. He denies redness, warmth about the hip. 1000mg tylenol today has not helped.     On my interviewing, patient/family members added that He felt weak today. He normally uses cane to ambulate. Today he could not walk at all. He has fallen once in the last 6 months. Patient denies any specific pain in his hips however says that his right buttock hurts. He denies any shooting pain or any previous diagnosis of sciatica.    ER course:  Patient had a hip x-ray, pelvic x-ray and CT scan that did not show any evidence of acute fracture. It was decided to admit him to the hospital for pain control and physical therapy for observation.    Past Medical History    I have reviewed this patient's medical history and updated it with pertinent information if needed.   Past Medical History:   Diagnosis Date     Chronic kidney disease, stage III (moderate)      Obesity, unspecified      Obstructive sleep apnea (adult) (pediatric)      Osteoarthrosis, unspecified whether generalized or localized, lower leg      Other abnormal blood chemistry     hyperuricemia     Other B-complex deficiencies      Other choreas     Hemiballism     Pure hypercholesterolemia      Skin ulcer (H)      Type II or unspecified type diabetes mellitus without mention of complication, not stated as uncontrolled      Unspecified essential hypertension        Past Surgical History   I have reviewed this patient's surgical history and updated it with pertinent information if needed.  Past Surgical History:    Procedure Laterality Date     COLONOSCOPY       ENT SURGERY      lanced abcess in left ear     GENITOURINARY SURGERY      removal of kidney stones     ORTHOPEDIC SURGERY      knee arthroscopy     ORTHOPEDIC SURGERY  2003    bilateral knee replacement     ORTHOPEDIC SURGERY      right knee arthroscopy     PHACOEMULSIFICATION WITH STANDARD INTRAOCULAR LENS IMPLANT  2/11/2014    Procedure: PHACOEMULSIFICATION WITH STANDARD INTRAOCULAR LENS IMPLANT;  CATARACT EXTRACTION WITH INTRA OCULAR LENS RIGHT;  Surgeon: Luis James MD;  Location: [facility] OR     PHACOEMULSIFICATION WITH STANDARD INTRAOCULAR LENS IMPLANT  3/11/2014    Procedure: PHACOEMULSIFICATION WITH STANDARD INTRAOCULAR LENS IMPLANT;  CATARACT EXTRACTION WITH INTRA OCULAR LENS LEFT  ;  Surgeon: Luis James MD;  Location: [facility] OR     TONSILLECTOMY & ADENOIDECTOMY  1942       Prior to Admission Medications   Prior to Admission Medications   Prescriptions Last Dose Informant Patient Reported? Taking?   Acetaminophen (TYLENOL PO) Unknown at Unknown time  Yes No   Sig: Take 500 mg by mouth Takes 2 tablets at HS   Ascorbic Acid (VITAMIN C PO) 7/28/2017 at Unknown time  Yes Yes   Sig: Take by mouth daily   Cholecalciferol (VITAMIN D HIGH POTENCY) 1000 UNITS CAPS 7/28/2017 at Unknown time  No Yes   Sig: TAKE 1 CAPSULE BY MOUTH DAILY   DOCQLACE 100 MG capsule 7/28/2017 at Unknown time  No Yes   Sig: TAKE 1 CAPSULE BY MOUTH TWICE DAILY   Elastic Bandages & Supports (T.E.D. BELOW KNEE/L-REGULAR) MISC Unknown at Unknown time  No No   Sig: Apply in am and take off in the evening   LANTUS SOLOSTAR 100 UNIT/ML soln 7/27/2017 at Unknown time  No Yes   Sig: INJECT 18 UNITS SUBCUTANEOUSLY EVERY DAY AT BEDTIME   Lancets Misc. (UNISTIK 3 NORMAL) MISC More than a month at Unknown time  No No   Sig: USE TO TEST BLOOD SUGARS TWO TIMES A DAY   METOPROLOL TARTRATE PO 7/28/2017 at Unknown time  Yes Yes   Sig: Take 25 mg by mouth 2 times daily   NOVOFINE 30G X 8 MM insulin pen needle  "Unknown at Unknown time  No No   Sig: USE 4 PENS NEEDLES DAILY OR AS DIRECTED   NOVOLOG FLEXPEN 100 UNIT/ML soln 7/28/2017 at Unknown time  No Yes   Sig: INJECT 8 UNITS SUBCUTANEOUS BEFORE BEDTIME IN ADDITION TO SLIDING SCALE   Silver-Carboxymethylcellulose (AQUACEL-AG EXTRA HYDROFIBER) 4\"X5\" PADS Unknown at Unknown time  No No   Sig: Externally apply 0.25 each topically every other day Use as directed by wound center   UNABLE TO FIND 7/27/2017 at Unknown time  Yes Yes   Sig: CPAP   allopurinol (ZYLOPRIM) 300 MG tablet 7/28/2017 at Unknown time  No Yes   Sig: TAKE 1 TABLET BY MOUTH EVERY DAY   aspirin 81 MG tablet 7/28/2017 at Unknown time  Yes Yes   Sig: Take 1 tablet by mouth daily   blood glucose (KEO MICROLET 2) lancing device Unknown at Unknown time  No No   Sig: Use to test blood sugars 2 times daily or as directed.   blood glucose monitoring (KEO CONTOUR MONITOR) meter device kit Unknown at Unknown time  No No   Sig: Use to test blood sugars 2 times daily or as directed.   blood glucose monitoring (KEO CONTOUR) test strip Unknown at Unknown time  No No   Sig: Use to test blood sugars 2 times daily or as directed.   calcium carbonate (OS-MARILIN 500 MG Lime. CA) 500 MG tablet 7/28/2017 at Unknown time  No Yes   Sig: Take 1 tablet (500 mg) by mouth 2 times daily   captopril (CAPOTEN) 50 MG tablet 7/28/2017 at Unknown time  No Yes   Sig: Take 2 tablets (100 mg) by mouth 3 times daily   cyanocolbalamin (VITAMIN  B-12) 500 MCG tablet 7/28/2017 at Unknown time  No Yes   Sig: TAKE 2 TABLETS (1,000 MCG) BY MOUTH DAILY   cyanocolbalamin (VITAMIN  B-12) 500 MCG tablet 7/28/2017 at Unknown time  No Yes   Sig: TAKE 2 TABLETS (1,000 MCG) BY MOUTH DAILY   fish oil-omega-3 fatty acids 1000 MG capsule 7/28/2017 at Unknown time  Yes Yes   Sig: Take 1,000 mg by mouth daily   ketoconazole (NIZORAL) 2 % cream Unknown at Unknown time  No No   Sig: Apply topically 2 times daily   Patient taking differently: Apply topically 2 " times daily as needed    levETIRAcetam 1000 MG TABS 7/28/2017 at Unknown time  No Yes   Sig: TAKE 1 TABLET BY MOUTH TWICE DAILY   metFORMIN (GLUCOPHAGE) 1000 MG tablet 7/28/2017 at Unknown time  No Yes   Sig: TAKE ONE TABLET BY MOUTH EVERY DAY WITH A MEAL   miconazole (MICATIN) 2 % AERP Unknown at Unknown time  No No   Sig: Apply 113 g topically 2 times daily   order for DME 7/28/2017 at Unknown time  No Yes   Sig: Equipment being ordered: Oxygen 2 LPM per nasal cannula during the day, portable also   order for DME Unknown at Unknown time  No No   Sig: Equipment being ordered: Wheelchair   order for DME Unknown at Unknown time  No No   Sig: Equipment being ordered: Meplilex Transfer 6 X 8 inch (170000) - disp 3;  Aquacel AG 4 X 5 ([id number]); disp 3 - Refills 6   order for DME Unknown at Unknown time  No No   Sig: 4x4 bulk SOFT gauze (disp 1 sleeve, refill x 5)   polyethylene glycol (MIRALAX/GLYCOLAX) powder 7/28/2017 at Unknown time  No Yes   Sig: TAKE 17 GRAMS BY MOUTH DAILY MIXED AS DIRECTED.   sertraline (ZOLOFT) 100 MG tablet 7/28/2017 at Unknown time  No Yes   Sig: TAKE 1 TABLET BY MOUTH ONCE DAILY   simvastatin (ZOCOR) 10 MG tablet 7/27/2017 at Unknown time  No Yes   Sig: TAKE 1 TABLET (10 MG) BY MOUTH AT BEDTIME   terazosin (HYTRIN) 10 MG capsule 7/27/2017 at Unknown time  No Yes   Sig: TAKE 1 CAPSULE BY MOUTH NIGHTLY AT BEDTIME   vitamin E (TOCOPHEROL) 100 UNIT capsule 7/28/2017 at Unknown time  Yes Yes   Sig: Take 100 Units by mouth daily Uncertain of dose      Facility-Administered Medications: None     Allergies   Allergies   Allergen Reactions     Hydrocodone      Intolerance       Penicillins Swelling       Social History   I have reviewed this patient's social history and updated it with pertinent information if needed. Trey Washington  reports that he quit smoking about 29 years ago. His smoking use included Cigars. He quit after 10.00 years of use. He has never used smokeless tobacco. He reports that  he does not drink alcohol or use illicit drugs.    Family History   I have reviewed this patient's family history and updated it with pertinent information if needed.   Family History   Problem Relation Age of Onset     C.A.D. Father      MI     Other - See Comments Father      shell shocked in the war     DIABETES Paternal Grandmother        Review of Systems   A complete 14 point review of systems was done and was found to be negative other than what is stated in HPI.    Physical Exam   Temp: 97.8  F (36.6  C) Temp src: Oral BP: 138/100   Heart Rate: 80 Resp: 18 SpO2: 96 % O2 Device: Nasal cannula Oxygen Delivery: 2 LPM  Vital Signs with Ranges  Temp:  [97.8  F (36.6  C)-98.1  F (36.7  C)] 97.8  F (36.6  C)  Heart Rate:  [77-85] 80  Resp:  [18-20] 18  BP: (123-154)/() 138/100  SpO2:  [93 %-96 %] 96 %  375 lbs 0 oz    General: Patient is sitting in wheelchair in no severe distress. Patient has a morbidly obese habitus.     HEAD: Normocephalic, atraumatic.     EYES: EOMI, PERRLA. No scleral icterus.     ENT: Oral cavity: Moist, no ulcers.     NECK: Supple, no carotid bruits or elevated JVP. Thyroid not palpable. No cervical     lymphadenopathy.     LUNGS: Clear to auscultation bilaterally. No wheezes, rales or rhonchi. Bilateral equal air entry.     CARDIOVASCULAR EXAM: S1, S2 heard normal. No murmurs, gallops or rubs. All peripheral     pulses palpable and symmetric.     ABDOMEN: Soft, non-distended, non-tender. No peritoneal signs. No organomegaly. No shifting     dullness or ascites. Bowel sounds are normal.     EXTREMITIES: No cyanosis, clubbing or edema. Surgical scars on both knees from previous knee replacement can be noticed.     SKIN: No rash, decubitus ulcers.     NEUROLOGIC EXAM: Alert and oriented x 3. No focal neurologic deficits. Cranial nerves II-XII     grossly intact. Strength 5/5 in flexors and extensors of upper and lower extremities. Sensation     symmetric and equal in all limbs and on  trunk. Patient is complaining of pain on doing the right-sided straight leg raise test.     LYMPHATIC EXAM: No axillary, cervical or inguinal lymphadenopathy.     MUSCULOSKELETAL: No joint effusions.     MENTAL: Mood appropriate. Not depressed, suicidal or homicidal.    Data   Data reviewed today:  I personally reviewed the CT scan of the hip and x-ray of pelvis image(s) showing no evidence of fracture however I do not have official report in front of me and this information has been relayed to me by the ER provider..    Recent Labs  Lab 07/28/17  1904   WBC 7.7   HGB 13.0*   MCV 95   *       Recent Results (from the past 24 hour(s))   CT Hip Right w/o Contrast    Narrative    CT SCAN OF RIGHT HIP    TECHNIQUE:  CT scan of right hip with sagittal and coronal  reconstructions.    REPORT:  The right hemipelvis is intact. The acetabulum appears  normal.  The right proximal femur appears normal.  No soft tissue  masses are seen.    IMPRESSION:  NO EVIDENCE OF HIP FRACTURE.  Exam Date: Jul 28, 2017 09:20:35 PM  Author: RAFAEL GUPTA  This report is preliminary and transcribed

## 2017-07-29 NOTE — PLAN OF CARE
[facility] Inpatient Admission Note:    Patient admitted to 3234/3234-1 at approximately 2335 via wheel chair accompanied by transport tech from emergency room . Report received from ER in SBAR format at 2330 via telephone. Patient transferred to bed via angela.. Patient is alert and oriented X 3, reports pain; rates at 5 on 0-10 scale.  Patient oriented to room, unit, hourly rounding, and plan of care. Explained admission packet and patient handbook with patient bill of rights brochure. Will continue to monitor and document as needed.     Inpatient Nursing criteria listed below was met:      Health care directives status obtained and documented: Yes      Care Everywhere authorization obtained No      MRSA swab completed for patient 65 years and older: Yes      Patient identifies a surrogate decision maker: Yes If yes, who:POA Contact Information:POA facesheet      Core Measure diagnosis present:No. If yes, state diagnosis: na       If initial lactic acid >2.0, repeat lactic acid drawn within one hour of arrival to unit: NA. If no, state reason: na      Vaccination assessment and education completed: Yes   Vaccinations received prior to admission: Pneumovax no  Influenza(seasonal)  YES   Vaccination(s) ordered: patient declines      Clergy visit ordered if patient requests: N/A      Skin issues/needs documented: N/A      Isolation Patient: no Education given, correct sign in place and documentation row added to PCS:  na      Fall Prevention Yes: Care plan updated, education given and documented, sticker and magnet in place: Yes      Care Plan initiated: Yes      Education Documented (including assessment): Yes      Patient has discharge needs : Yes If yes, please explain:c dariel

## 2017-07-29 NOTE — PLAN OF CARE
Problem: Goal Outcome Summary  Goal: Goal Outcome Summary  Outcome: No Change  Patient admitted to the floor via wheel chair. Unable to ambulate to the bed. Dahlia lift utilized. Patients groin is constantly moist with noted redness. Has a powder for this. Requires extremely heavy assist with all movement. Patient refused repositioning throughout the shift stating that he is only comfortable on his right side. Patient educated on the fact that it is his right hip that is causing him pain so maybe laying on his left would help alleviate the pain. Patient refused any other positioning. Rates pain to the area 5/10. Received PRN tylenol with positive relief. Respiratory set up CPAP for patient use. Hooked to 3.5L O2 per patients home regimen. Vitals remain stable. /79  Pulse 71  Temp 97.3  F (36.3  C) (Oral)  Resp 20  Wt (!) 170.1 kg (375 lb)  SpO2 96%  BMI 53.81 kg/m2. No IV access at this time.         Face to face report given with opportunity to observe patient.    Report given to LON Thayer and LON Mckinney   7/29/2017  7:21 AM

## 2017-07-29 NOTE — PLAN OF CARE
Problem: Patient Goal: Social Work Focus  Goal: 1. Patient Goal: Social Work Focus  Outcome: No Change  Assessment done via flowsheet.     LOC: awake, alert and oriented X3  Others present: no one     Dx: right hip pain     Primary PCP: Moreno Desai  Health Care Directive: has one done, was asked to bring a copy for the EMR  Pharmacy: [facility] in [place]     Living at:home in [place]  Lives with: alone  Support System: personal caregivers that he hires privately; he has one daughter who is handicapped and lives in [place]; his POA is Adri Willett who is also his PCA  Home/county services: none     Adequate Resources for needs (housing, utilities, food/med): yes     Meds and appointments management: independent     Transportation: he does not drive, he has a minivan that he can get into and his PCA's then drive him as needed     Equipment used: CPAP, oxygen from [facility], cane  ADLs: assist of 1; caregivers come in from 9-1 am then again from 5-9 in the evening  Ambulation: he has been able to walk around in the home using a cane or the furniture/walls for support  Falls: recently slipped on his sidewalk, assisted back up by ambulance crew     Household: PCA's  Community/social activity/work: does not leave home except for appointments     [place]: was in the [employer] for several years but was told he did not qualify as a   VA referral line called: no     Able to Return to Prior Living Arrangements: uncertain     Goals: in discussing the discharge plan for him he was agreeable to going to a SNF and named [facility] as a place he's been in the past and would go to again, ultimately would like to try to stay close to the area. However in discussing payment, he is unable to pay to go to a SNF. He feels he will be able to manage at home if he can get home PT.      Barriers: payment                   Weight 375#                   Stairs into the home     EVELYN: none

## 2017-07-29 NOTE — PROGRESS NOTES
Provided Trey with the [facility] referral. We discussed planning for future needs in regard to need for rehab/therapy and his finances. He was agreeable to calling the [facility] this week to re-evaluate any services that could be provided through their system as he says when he last worked with the [facility] he still had money in a saving account and this has been entirely spent. [facility] was called and a referral sent for home PT; it is not known at this time how soon PT service could begin for him. He says that at this time he always has 2 PCA's present to help him up/down the steps at home.

## 2017-07-29 NOTE — PLAN OF CARE
Problem: Goal Outcome Summary  Goal: Goal Outcome Summary  Outcome: No Change  OT evaluation completed.  ADL DIFFICULTIES: Min to Max A  EQUIPMENT NEEDS: none at this time  D/C RECOMMENDATIONS: Short-term rehab  TREATMENT D/C RECOMMENDATIONS: OT to to increase activity tolerance and ADL function  COMMENTS: pt does have PCA services 8hrs/day 7days/wk at home. His current level of functioning is close to his baseline as he does receive assistance with all ADLs and IADLs.  Pt reports he is able to get up to bathroom independently at night, currently however, he is requiring assistance for this task.  PT also reports pt would not be able to manage stairs to get in to home at this time. Will complete orders as pt is observation status.

## 2017-07-29 NOTE — PROGRESS NOTES
07/29/17 0900   Quick Adds   Type of Visit Initial PT Evaluation   Living Environment   Lives With alone   Living Arrangements house   Home Accessibility stairs to enter home   Number of Stairs to Enter Home 3   Number of Stairs Within Home 0   Stair Railings at Home outside, present at both sides   Self-Care   Usual Activity Tolerance fair   Current Activity Tolerance poor   Equipment Currently Used at Home cane, straight   Functional Level Prior   Ambulation 1-->assistive equipment   Transferring 1-->assistive equipment   Toileting 1-->assistive equipment   Fall history within last six months yes   Number of times patient has fallen within last six months 1   Which of the above functional risks had a recent onset or change? ambulation;transferring;fall history   Prior Functional Level Comment Pt reports he   General Information   Onset of Illness/Injury or Date of Surgery - Date 07/28/17   Referring Physician Dr. Catalan   Pertinent History of Current Problem (include personal factors and/or comorbidities that impact the POC) Pt hospitalized due to hip pain and leg giving out for past few days. PMH includes CKD, morbid obesity, sleep apnea, DM.  X-ray was negative   Precautions/Limitations fall precautions;oxygen therapy device and L/min  (3.5L)   Weight-Bearing Status - LLE full weight-bearing   Weight-Bearing Status - RLE full weight-bearing   Cognitive Status Examination   Orientation orientation to person, place and time   Level of Consciousness alert   Follows Commands and Answers Questions 100% of the time   Personal Safety and Judgment intact   Memory intact   Pain Assessment   Patient Currently in Pain Yes, see Vital Sign flowsheet   Posture    Posture Forward head position;Protracted shoulders   Range of Motion (ROM)   ROM Quick Adds No deficits were identified   Strength   Strength Comments BLE strength grossly 4- to 4/5. No significant strength difference R vs L   Bed Mobility   Bed Mobility Bed  mobility skill: Supine to sit   Bed Mobility Skill: Supine to Sit   Level of [place]: Supine/Sit moderate assist (50% patients effort)   Physical Assist/Nonphysical Assist: Supine/Sit 1 person assist   Assistive Device: Supine/Sit bed rails   Transfer Skills   Transfer Transfer Skill: Sit to Stand   Transfer Skill:  Sit to Stand   Level of [place]: Sit/Stand minimum assist (75% patients effort)   Physical Assist/Nonphysical Assist: Sit/Stand 1 person + 1 person to manage equipment   Weightbearing Restrictions: Sit/Stand full weight-bearing   Assistive Device for Transfer: Sit/Stand rolling walker   Gait   Gait Gait Skill   Gait Skills   Level of [place]: Gait minimum assist (75% patients effort)   Physical Assist/Nonphysical Assist: Gait 1 person + 1 person to manage equipment   Weight-Bearing Restrictions: Gait full weight-bearing   Assistive Device for Transfer: Gait rolling walker   Gait Distance (10ft)   Sensory Examination   Sensory Perception no deficits were identified   Coordination   Coordination no deficits were identified   Muscle Tone   Muscle Tone no deficits were identified   Clinical Impression   Criteria for Skilled Therapeutic Intervention evaluation only  (Observation status)   PT Diagnosis Difficulty c walking, pain limiting function, decreased activity tolerance   Influenced by the following impairments Difficulty c walking, pain limiting function, decreased activity tolerance   Functional limitations due to impairments Decreased safety and independence c functional mobility tasks, increased fall risk   Clinical Presentation Stable/Uncomplicated   Clinical Presentation Rationale PMH DM, obesity, CKD   Clinical Decision Making (Complexity) Low complexity   Predicted Duration of Therapy Intervention (days/wks) Today   Anticipated Discharge Disposition Transitional Care Facility   Risk & Benefits of therapy have been explained Yes   Patient, Family & other staff in agreement with plan  "of care Yes   Clinical Impression Comments Pt admitted due to R hip pain and decreased ability to ambulate. Today pt required moderate assist for bed mobility, min A x2 for transfers and was able to ambulate 10ft c FWW and min A on 3.5L O2. Based on pt's current status he would not be able to return home. Pt's home has 3 stairs to enter which pt would not be safe attempting right now.  He lives alone but does get some PCA help during the day. Currently recommending d/c to TCU.   [facility] AM-PAC TM \"6 Clicks\"   2016, Trustees of [facility], under license to [employer].  All rights reserved.   6 Clicks Short Forms Basic Mobility Inpatient Short Form   [facility] AM-PAC  \"6 Clicks\" V.2 Basic Mobility Inpatient Short Form   1. Turning from your back to your side while in a flat bed without using bedrails? 3 - A Little   2. Moving from lying on your back to sitting on the side of a flat bed without using bedrails? 2 - A Lot   3. Moving to and from a bed to a chair (including a wheelchair)? 3 - A Little   4. Standing up from a chair using your arms (e.g., wheelchair, or bedside chair)? 3 - A Little   5. To walk in hospital room? 3 - A Little   6. Climbing 3-5 steps with a railing? 2 - A Lot   Basic Mobility Raw Score (Score out of 24.Lower scores equate to lower levels of function) 16   Total Evaluation Time   Total Evaluation Time (Minutes) 15     I certify the need for these services furnished under this plan of treatment and while under my care. (Physician co-signature of this document indicates review and certification of the therapy plan).      "

## 2017-07-29 NOTE — PLAN OF CARE
Problem: Goal Outcome Summary  Goal: Goal Outcome Summary  Outcome: No Change  Pt hip pain minimal/sore per patient. Pt refused his scheduled Celebrex, he states he doesn't want to take it because of his abdominal bleed history. Pt had a BM today on the commode. Pt also urinal voiding adequate amounts. Pt a&o. Up with assistX2. Pt on 3L NC. No IV access present. Pt worked with PT and OT today. Pt DNR. Uses call light appropriately. /84  Pulse 71  Temp 97.4  F (36.3  C) (Oral)  Resp 18  Wt (!) 170.1 kg (375 lb)  SpO2 99%  BMI 53.81 kg/m2.        Face to face report given with opportunity to observe patient.    Report given to Gabriella Tavares   7/29/2017  3:15 PM

## 2017-07-29 NOTE — PROGRESS NOTES
07/29/17 1041   Quick Adds   Quick Adds Certification   Type of Visit Initial Occupational Therapy Evaluation   Living Environment   Lives With alone   Living Arrangements house   Home Accessibility stairs to enter home   Number of Stairs to Enter Home 3   Number of Stairs Within Home 0   Stair Railings at Home outside, present at both sides   Transportation Available family or friend will provide   Self-Care   Dominant Hand right   Usual Activity Tolerance fair   Current Activity Tolerance poor   Regular Exercise no   Equipment Currently Used at Home oxygen;cane, straight;shower chair;grab bar   Activity/Exercise/Self-Care Comment Pt has PCA services 7 days/wk 4 hrs in morning 4 hrs in evening   Functional Level Prior   Ambulation 1-->assistive equipment   Transferring 1-->assistive equipment   Toileting 1-->assistive equipment   Bathing 3-->assistive equipment and person   Dressing 2-->assistive person   Eating 0-->independent   Communication 0-->understands/communicates without difficulty   Swallowing 0-->swallows foods/liquids without difficulty   Cognition 0 - no cognition issues reported   Fall history within last six months yes   Number of times patient has fallen within last six months 1       Present no   General Information   Onset of Illness/Injury or Date of Surgery - Date 07/28/17   Referring Physician Alayna Sánchez MD   Patient/Family Goals Statement pt reports he just wants to get back to his puppy   Additional Occupational Profile Info/Pertinent History of Current Problem Pt presented with R hip pain. Pt has PMH of sleep apnea and chronic kidney disease. Pt has assistance with ADLs and IADLs daily in home. Pt does have a small dog and a daughter that is in a group home in [place] and would like to be able to stay geographically close to her.   Precautions/Limitations fall precautions;oxygen therapy device and L/min   Weight-Bearing Status - LUE full weight-bearing    Weight-Bearing Status - RUE full weight-bearing   Weight-Bearing Status - LLE full weight-bearing   Weight-Bearing Status - RLE full weight-bearing   Heart Disease Risk Factors Diabetes;High blood pressure;Overweight;Lack of physical activity   Cognitive Status Examination   Orientation orientation to person, place and time   Level of Consciousness alert   Able to Follow Commands WNL/WFL   Personal Safety (Cognitive) WNL/WFL   Memory intact   Attention No deficits were identified   Organization/Problem Solving No deficits were identified   Executive Function No deficits were identified   Visual Perception   Visual Perception Wears glasses   Sensory Examination   Sensory Comments pt reports sometimes he gets tingling in R hand   Pain Assessment   Patient Currently in Pain Yes, see Vital Sign flowsheet  (R buttock 4/10)   Range of Motion (ROM)   ROM Comment B shoulder grossly 90 degrees flx, pt reports arthritis in shoulders   Strength   Strength Comments B UE grossly 3/5   Muscle Tone Assessment   Muscle Tone Quick Adds No deficits were identified   Coordination   Upper Extremity Coordination No deficits were identified   Transfer Skill: Sit to Stand   Level of [place]: Sit/Stand minimum assist (75% patients effort)   Physical Assist/Nonphysical Assist: Sit/Stand 1 person assist   Upper Body Dressing   Level of [place]: Dress Upper Body minimum assist (75% patients effort)   Physical Assist/Nonphysical Assist: Dress Upper Body 1 person assist   Lower Body Dressing   Level of [place]: Dress Lower Body maximum assist (25% patients effort)   Physical Assist/Nonphysical Assist: Dress Lower Body 1 person assist   Toileting   Level of [place]: Toilet minimum assist (75% patients effort)   Physical Assist/Nonphysical Assist: Toilet 1 person assist   Grooming   Level of [place]: Grooming minimum assist (75% patients effort)   Physical Assist/Nonphysical Assist: Grooming 1 person assist   Assistive  "Device electric razor;other (see comments)  (pt reports his arm gets too tired holding the razor)   Eating/Self Feeding   Level of [place]: Eating independent   Physical Assist/Nonphysical Assist: Eating set-up required   Instrumental Activities of Daily Living (IADL)   Previous Responsibilities finances   IADL Comments pt has assistance with all IADLs and med set up   Activities of Daily Living Analysis   Impairments Contributing to Impaired Activities of Daily Living pain;flexibility decreased;strength decreased   Clinical Impression   Criteria for Skilled Therapeutic Interventions Met evaluation only   OT Diagnosis decreased activity tolerance and ADL function   Influenced by the following impairments pain, decreased strength and flexibility   Assessment of Occupational Performance 3-5 Performance Deficits   Identified Performance Deficits pain, decreased shoulder AROM, obesity, decreased activity tolerance   Clinical Decision Making (Complexity) Moderate complexity   Anticipated Discharge Disposition Transitional Care Facility   Risks and Benefits of Treatment have been explained. Yes   Patient, Family & other staff in agreement with plan of care Yes   Clinical Impression Comments Pt has PCA services in place daily, however, he has decreased activity tolerance and would benefit from rehab to improve function. If pt is not able to go to inpatient rehab, home care may be an option, however, PT does not think pt will be able to manage stairs to get into home at this time.    Therapy Certification   Start of Care Date 07/29/17   Certification date from 07/29/17   Certification date to 07/29/17   Medical Diagnosis hip pain   Certification I certify the need for these services furnished under this plan of treatment and while under my care.  (Physician co-signature of this document indicates review and certification of the therapy plan).   [facility] AM-PAC TM \"6 Clicks\"   2016, Trustees of [place] [employer], under license to [employer].  All rights reserved.   6 Clicks Short Forms Daily Activity Inpatient Short Form   [facility] AM-PAC  \"6 Clicks\" Daily Activity Inpatient Short Form   1. Putting on and taking off regular lower body clothing? 1 - Total   2. Bathing (including washing, rinsing, drying)? 2 - A Lot   3. Toileting, which includes using toilet, bedpan or urinal? 3 - A Little   4. Putting on and taking off regular upper body clothing? 3 - A Little   5. Taking care of personal grooming such as brushing teeth? 3 - A Little   6. Eating meals? 4 - None   Daily Activity Raw Score (Score out of 24.Lower scores equate to lower levels of function) 16   Total Evaluation Time   Total Evaluation Time (Minutes) 24     "

## 2017-07-29 NOTE — PROVIDER NOTIFICATION
Dr. bauer notified of b/p 180/100/ 174/91 and heart rate 55. Dr bauer is aware of his b/ps. No new orders received. And dr. bauer stated to give his metoprolol, hytrin and captopril tonight as ordered.

## 2017-07-29 NOTE — PLAN OF CARE
Problem: Goal Outcome Summary  Goal: Goal Outcome Summary  GAIT/AMBULATION: 10ft c FWW and min A x2 on 3.5L O2 limiting by decreased activity tolerance, feeling like RLE will give out  EQUIPMENT NEEDS AT DISCHARGE: NA  D/C RECOMMENDATIONS: TCU  TREATMENT AT D/C: Continued PT  COMMENTS: PT evaluation completed. Pt admitted due to R hip pain and decreased ability to ambulate. Today pt required moderate assist for bed mobility, min A x2 for transfers and was able to ambulate 10ft c FWW and min A on 3.5L O2. Based on pt's current status he would not be safe to return home. Pt's home has 3 stairs to enter which pt would not be safe attempting right now.  He lives alone but does get some PCA help during the day. Currently recommending d/c to TCU.     Addendum:  Pt had a significant change in function per nursing report on 7/31/17 and was able to ambulate 50ft c FWW. Pt may now be appropriate for stairs. Plan to attempt stairs as necessary for safe d/c home. Pt is refusing to go to a TCU

## 2017-07-29 NOTE — ED NOTES
Attempted to walk pt with walker with assist of 4. Pt took approx 3 steps. Stated right leg didn't want to hold him up, painful to walk.

## 2017-07-29 NOTE — PLAN OF CARE
"Pt up in chair. States she is feeling better now then this am.  Assessment and vitals as charted.  Pt offers no complaints.  1655: pt stood up using walker able to bare weight but hard time getting to standing. Pt voided using urinal and then back to chair ordering supper at this time. States his hip pain is \"fine\".  1820: pt continues to be up in chair. Offers no complaints. Ate dinner without difficulty.  2000: up to commode, passed gas and voided. Pt then wishs to go back to chair. Pt able to bear weight and with walker able to pivot to commode and back to chair. Pt denies pain and vitals as charted on graphic sheet.  2125: blood sugar 112. Pt states he usually will eat a sandwhich, cookies and orange juice now and then take his lantus, so his sugars are ok in the am.  Pt given sandwich cookies and orange juice. Took hs meds without difficulty and refuses the celebrex until he talks with the doctor.  2200: pt used urinal then to bed with assist of 2.  Pt able to walk to bed but needed help of 2 people to get in the bed. Powder applied to folds. Pt on right side states it is the only way he will sleep.  R.t called to put cpap on for the night  2300: pt asleep, cpap on. Pt appears comfortable. resp even and unlaored  "

## 2017-07-29 NOTE — ED NOTES
Discussed using a walker. Pt stated he didn't want to use a walker- ours in ER have front wheels and pt worried wheels will slip forward and he will fall.

## 2017-07-29 NOTE — PROGRESS NOTES
[facility]    Hospitalist Progress Note    Date of Service (when I saw the patient): 07/29/2017    Assessment & Plan   Trey Washington is a 82 year old male who was admitted on 7/28/2017.      #1 acute hip pain  Patient has alot of chronic pains.  Over last couple day his right hip buttock area has been more sore  yesterday got to point that could not stand leg would give way   That is reason for coming to ER   Overnight  Has some discomfort there  Tends to lie on his right side.  Was able t0 at least stand with PT evaluation this am could walk a few steps but leg was giving way.  PT recc not safe to go home at this time.  Patient states that what he was able to do this am was much more than yesterday.  He has had no real trauma  Fell on buttocks a couple weeks ago had no acute pain at that time.   Discussed that our options are limited.   Had Ct of hi no fracture is seen.  If he is unable to ambulate safely cannot go home.  Would mean inpatient rehab or SNF  He is observation patient at this time cannot afford out of pocket expense for SNF.  Florencio try some antiinflammatory agent today  re evaluate I am  If better then home  If not at all better then will need to have ortho see pt and consider injection if felt to be his hip doesn't seem to be bursitis at this time.  Florencio have case management help out with possible DC planning.      Active Problems:  #2 type 2 diabetes mellitus  We will continue patient on Lantus insulin and NovoLog insulin in addition to sliding scale.  Sugars are okay at htistime     #3 obstructive sleep apnea and need for CPAP  Patient has to wear CPAP/BiPAP whenever patient is asleep whether day or night.   Will use home unit .  SATs fine is on chronic O2 continuously.    #4 chronic kidney disease stage III  Labs stable  Will use just a few doses of Celebrex to see if help him out watching his renal labs.     #5 dyslipidemia, hypertension,, morbid obesity, gout, vitamin B12  deficiency  Stable. Continue home medications. Continue to monitor.     #6 obesity hypoventilation syndrome, possible COPD  Patient is on 3-1/2 L/m of oxygen at baseline. We will continue with supplemental oxygen.      Code Status: DNR    Disposition: Expected discharge in 1-2 days once able to ambulate safely.    Avi Desouza    Interval History   No issues overnight.  hemodynamically stable  SATs fine wore CPAP.  Right hip area feels a better using tylenol   Able to stand with PT and take a few steps  Better than yesterday.    -Data reviewed today: I reviewed all new labs and imaging results over the last 24 hours. I personally reviewed no images or EKG's today.    Physical Exam   Temp: 97.4  F (36.3  C) Temp src: Oral BP: 161/85 Pulse: 71 Heart Rate: 62 Resp: 18 SpO2: 99 % O2 Device: Nasal cannula Oxygen Delivery: 3 LPM  Vitals:    07/28/17 2035   Weight: (!) 170.1 kg (375 lb)     Vital Signs with Ranges  Temp:  [97.3  F (36.3  C)-98.1  F (36.7  C)] 97.4  F (36.3  C)  Pulse:  [71] 71  Heart Rate:  [62-85] 62  Resp:  [18-24] 18  BP: (123-161)/() 161/85  SpO2:  [93 %-99 %] 99 %  I/O last 3 completed shifts:  In: -   Out: 600 [Urine:600]       No line/device    Constitutional: Alert and oriented x3. No distress    HEENT: NC/AT, PERRL, EOMI, mouth moist, neck supple, no LN.     Cardiovascular: RRR. n Murmur, no  rubs, or gallops.  JVD na.  Bruits no.  Pulses 2+    Respiratory: Clear to auscultation bilaterally    Abdomen: Soft, NTND, no organomegaly. Bowel sounds present    Extremities: Warm/dry. No edema internal external rotation of right hip somewhat limited but no severe pain    Neuro:   Non focal, cranial nerves intact, Moves all extremities.    Medications        allopurinol  300 mg Oral Daily     aspirin EC  81 mg Oral Daily     captopril  100 mg Oral TID     docusate sodium  100 mg Oral BID     insulin glargine  18 Units Subcutaneous At Bedtime     levETIRAcetam  1,000 mg Oral BID     metFORMIN  500  mg Oral BID w/meals     metoprolol (LOPRESSOR) tablet 25 mg  25 mg Oral BID     miconazole   Topical BID     insulin aspart  8 Units Subcutaneous QAM AC     polyethylene glycol  17 g Oral Daily     sertraline  100 mg Oral Daily     simvastatin  10 mg Oral At Bedtime     terazosin  2 mg Oral At Bedtime       Data     Recent Labs  Lab 07/28/17  1904   WBC 7.7   HGB 13.0*   MCV 95   *     Lactic Acid   Date Value Ref [facility] Status   03/11/2017 2.9 (H) 0.4 - 2.0 mmol/L Final       Recent Results (from the past 24 hour(s))   CT Hip Right w/o Contrast    Narrative    CT SCAN OF RIGHT HIP    TECHNIQUE:  CT scan of right hip with sagittal and coronal  reconstructions.    REPORT:  The right hemipelvis is intact. The acetabulum appears  normal.  The right proximal femur appears normal.  No soft tissue  masses are seen.    IMPRESSION:  NO EVIDENCE OF HIP FRACTURE.  Exam Date: Jul 28, 2017 09:20:35 PM  Author: RAFAEL GUPTA  This report is preliminary and transcribed

## 2017-07-30 LAB
ANION GAP SERPL CALCULATED.3IONS-SCNC: 6 MMOL/L (ref 3–14)
BACTERIA SPEC CULT: NORMAL
BUN SERPL-MCNC: 39 MG/DL (ref 7–30)
CALCIUM SERPL-MCNC: 9.3 MG/DL (ref 8.5–10.1)
CHLORIDE SERPL-SCNC: 106 MMOL/L (ref 94–109)
CO2 SERPL-SCNC: 31 MMOL/L (ref 20–32)
CREAT SERPL-MCNC: 1.63 MG/DL (ref 0.66–1.25)
GFR SERPL CREATININE-BSD FRML MDRD: 41 ML/MIN/1.7M2
GLUCOSE BLDC GLUCOMTR-MCNC: 100 MG/DL (ref 70–99)
GLUCOSE BLDC GLUCOMTR-MCNC: 101 MG/DL (ref 70–99)
GLUCOSE BLDC GLUCOMTR-MCNC: 112 MG/DL (ref 70–99)
GLUCOSE BLDC GLUCOMTR-MCNC: 132 MG/DL (ref 70–99)
GLUCOSE SERPL-MCNC: 99 MG/DL (ref 70–99)
MICRO REPORT STATUS: NORMAL
POTASSIUM SERPL-SCNC: 4.6 MMOL/L (ref 3.4–5.3)
SODIUM SERPL-SCNC: 143 MMOL/L (ref 133–144)
SPECIMEN SOURCE: NORMAL

## 2017-07-30 PROCEDURE — A9270 NON-COVERED ITEM OR SERVICE: HCPCS | Mod: GY | Performed by: INTERNAL MEDICINE

## 2017-07-30 PROCEDURE — 99225 ZZC SUBSEQUENT OBSERVATION CARE,LEVEL II: CPT | Performed by: NURSE PRACTITIONER

## 2017-07-30 PROCEDURE — 00000146 ZZHCL STATISTIC GLUCOSE BY METER IP

## 2017-07-30 PROCEDURE — 80048 BASIC METABOLIC PNL TOTAL CA: CPT | Performed by: NURSE PRACTITIONER

## 2017-07-30 PROCEDURE — G0378 HOSPITAL OBSERVATION PER HR: HCPCS

## 2017-07-30 PROCEDURE — 36415 COLL VENOUS BLD VENIPUNCTURE: CPT | Performed by: NURSE PRACTITIONER

## 2017-07-30 PROCEDURE — 25000132 ZZH RX MED GY IP 250 OP 250 PS 637: Mod: GY | Performed by: INTERNAL MEDICINE

## 2017-07-30 RX ORDER — POLYETHYLENE GLYCOL 3350 17 G/17G
17 POWDER, FOR SOLUTION ORAL DAILY PRN
Status: DISCONTINUED | OUTPATIENT
Start: 2017-07-30 | End: 2017-07-31 | Stop reason: HOSPADM

## 2017-07-30 RX ADMIN — METOPROLOL TARTRATE 25 MG: 25 TABLET ORAL at 21:51

## 2017-07-30 RX ADMIN — CAPTOPRIL 100 MG: 25 TABLET ORAL at 13:39

## 2017-07-30 RX ADMIN — INSULIN ASPART 8 UNITS: 100 INJECTION, SOLUTION INTRAVENOUS; SUBCUTANEOUS at 09:32

## 2017-07-30 RX ADMIN — ALLOPURINOL 300 MG: 300 TABLET ORAL at 09:30

## 2017-07-30 RX ADMIN — METFORMIN HYDROCHLORIDE 500 MG: 500 TABLET ORAL at 09:31

## 2017-07-30 RX ADMIN — ASPIRIN 81 MG: 81 TABLET, COATED ORAL at 09:31

## 2017-07-30 RX ADMIN — TERAZOSIN HYDROCHLORIDE 2 MG: 2 CAPSULE ORAL at 21:50

## 2017-07-30 RX ADMIN — SERTRALINE HYDROCHLORIDE 100 MG: 100 TABLET ORAL at 09:30

## 2017-07-30 RX ADMIN — LEVETIRACETAM 1000 MG: 500 TABLET ORAL at 09:31

## 2017-07-30 RX ADMIN — CAPTOPRIL 100 MG: 25 TABLET ORAL at 09:31

## 2017-07-30 RX ADMIN — DOCUSATE SODIUM 100 MG: 100 CAPSULE, LIQUID FILLED ORAL at 09:31

## 2017-07-30 RX ADMIN — MICONAZOLE NITRATE: 2 POWDER TOPICAL at 09:31

## 2017-07-30 RX ADMIN — METOPROLOL TARTRATE 25 MG: 25 TABLET ORAL at 09:31

## 2017-07-30 RX ADMIN — CAPTOPRIL 100 MG: 25 TABLET ORAL at 21:51

## 2017-07-30 RX ADMIN — LEVETIRACETAM 1000 MG: 500 TABLET ORAL at 21:50

## 2017-07-30 RX ADMIN — MICONAZOLE NITRATE: 2 POWDER TOPICAL at 21:54

## 2017-07-30 RX ADMIN — DOCUSATE SODIUM 100 MG: 100 CAPSULE, LIQUID FILLED ORAL at 21:51

## 2017-07-30 RX ADMIN — METFORMIN HYDROCHLORIDE 500 MG: 500 TABLET ORAL at 18:07

## 2017-07-30 RX ADMIN — SIMVASTATIN 10 MG: 10 TABLET, FILM COATED ORAL at 21:50

## 2017-07-30 NOTE — PLAN OF CARE
Problem: Goal Outcome Summary  Goal: Goal Outcome Summary  Outcome: No Change  Pt is A&Ox 4.  Assessment as charted.Urine is yellow in color and clear. Denies pain this shift. No falls or injuries this shift.  Pt uses call light appropriately. Blood sugar stable in the middle of the night.  Pt refused to be repositioned, and continued to lay on right side. Vital signs stable.   Blood pressure 167/78, pulse 71, temperature 98  F (36.7  C), temperature source Tympanic, resp. rate 22, weight (!) 170.1 kg (375 lb), SpO2 96 %.

## 2017-07-30 NOTE — PLAN OF CARE
Problem: Goal Outcome Summary  Goal: Goal Outcome Summary  OBS Goal: Diagnostics completed and resulted, VSS. Pain controlled and safe discharge plan   Outcome: Improving  Pt up with assistX1 to commode and chair. Pt has some pain to r hip, pt refuses scheduled Celebrex. Pt states he feels that he is at his baseline mobility. On 4L NC. HR SB. No iv available. Pt a&o, calls appropriately. Sugars and VSS.     Face to face report given with opportunity to observe patient.    Report given to juhi Tavares   7/30/2017  3:26 PM

## 2017-07-30 NOTE — PROGRESS NOTES
"Visited with Trey again today about discharge planning. We discussed that, depending on PT assessment results, he will be deemed either to be safe to go home or need to go to a SNF. He is of the belief that he is at his baseline and will be safe to return home with PCA's and home PT. We explored the idea of him finding resources to go to SNF even for a short time where he could have therapy daily; he continues to decline this saying he cannot afford it. He was offered the LT payment application to begin the process for future planning. He vehemently refused this saying \"My  told me not to sign anything!\" and expressed concerns about his house being put in a trust for his daughter. He thinks he has about $1,000 remaining in his savings account and said he needs this to pay his PCA's. When questioned about how he will pay them after that money is gone he replied \"I'll cross that bridge when I get to it\". He was again encouraged to call [facility] to be re-evaluated for services through them; he agree's to do so.   "

## 2017-07-30 NOTE — PROGRESS NOTES
[facility]    Hospitalist Progress Note    Date of Service (when I saw the patient): 07/30/2017    Assessment & Plan   Trey Washington is a 82 year old male who was admitted on 7/28/2017.       Right hip pain: Pain has improved from admission, he has been able to walk with assistance of walker about the room. However he has 3 steps to get in his house. Unable to evaluate him for stairs due to no physical therapy available. Per their original assessment he was unsafe to return home, however during that assessment he was still having very significant pain. Continue celebrex for short term duration.     CKD stage 3: Remains at baseline    DVT Prophylaxis: Pneumatic Compression Devices  Code Status: DNR    Disposition: Medically stable for discharge,however safe discharge plan unknown.  working on it. Expected discharge in 1 days once disposition arranged.    Veronica Hood CNP    Interval History   Feels pain has improved dramatically. No other complaints.    -Data reviewed today: I reviewed all new labs and imaging results over the last 24 hours. I personally reviewed no images or EKG's today.    Physical Exam   Temp: 97.4  F (36.3  C) Temp src: Tympanic BP: 177/100   Heart Rate: 58 Resp: 20 SpO2: 92 % O2 Device: Nasal cannula Oxygen Delivery: 4 LPM  Vitals:    07/28/17 2035   Weight: (!) 170.1 kg (375 lb)     Vital Signs with Ranges  Temp:  [97.4  F (36.3  C)-98.3  F (36.8  C)] 97.4  F (36.3  C)  Heart Rate:  [53-59] 58  Resp:  [18-22] 20  BP: (159-192)/() 177/100  SpO2:  [92 %-97 %] 92 %  I/O last 3 completed shifts:  In: 380 [P.O.:380]  Out: 875 [Urine:875]       Line/device assessment(s) completed for medical necessity    Constitutional: Awake,alert, no acute distress  Respiratory: Diminished bilaterally throughout, no crackles,wheezes or rhonchi.  Cardiovascular: HRR, no murmur,rubs, thrills. No JVD. No peripheral swelling.  GI: Obese,sot,nontender,bowel sounds  positive.  Skin/Integumen: No opne areas,rashes or bruising noted.  MSK: Laying on right side in bed. He is able to reposition himself in bed. Denies pain on palpation to right hip and buttock.     Medications        allopurinol  300 mg Oral Daily     aspirin EC  81 mg Oral Daily     captopril  100 mg Oral TID     docusate sodium  100 mg Oral BID     insulin glargine  18 Units Subcutaneous At Bedtime     levETIRAcetam  1,000 mg Oral BID     metFORMIN  500 mg Oral BID w/meals     metoprolol (LOPRESSOR) tablet 25 mg  25 mg Oral BID     miconazole   Topical BID     insulin aspart  8 Units Subcutaneous QAM AC     polyethylene glycol  17 g Oral Daily     sertraline  100 mg Oral Daily     simvastatin  10 mg Oral At Bedtime     terazosin  2 mg Oral At Bedtime     celecoxib  50 mg Oral BID       Data     Recent Labs  Lab 07/30/17  0735 07/29/17  1330 07/28/17  1904   WBC  --   --  7.7   HGB  --   --  13.0*   MCV  --   --  95   PLT  --   --  149*     --   --    POTASSIUM 4.6  --   --    CHLORIDE 106  --   --    CO2 31  --   --    BUN 39*  --   --    CR 1.63* 1.59*  --    ANIONGAP 6  --   --    MARILIN 9.3  --   --    GLC 99  --   --        No results found for this or any previous visit (from the past 24 hour(s)).

## 2017-07-30 NOTE — PLAN OF CARE
Face to face report given with opportunity to observe patient.    Report given to LON Thayer and LON Mckinney   7/30/2017  7:35 AM

## 2017-07-30 NOTE — PLAN OF CARE
"Problem: Goal Outcome Summary  Goal: Goal Outcome Summary  Outcome: No Change  Pt up in chair. States she is feeling better now then this am.  Assessment and vitals as charted.  Pt offers no complaints.  1655: pt stood up using walker able to bare weight but hard time getting to standing. Pt voided using urinal and then back to chair ordering supper at this time. States his hip pain is \"fine\".  1820: pt continues to be up in chair. Offers no complaints. Ate dinner without difficulty.  2000: up to commode, passed gas and voided. Pt then wishs to go back to chair. Pt able to bear weight and with walker able to pivot to commode and back to chair. Pt denies pain and vitals as charted on graphic sheet.  2125: blood sugar 112. Pt states he usually will eat a sandwhich, cookies and orange juice now and then take his lantus, so his sugars are ok in the am.  Pt given sandwich cookies and orange juice. Took hs meds without difficulty and refuses the celebrex until he talks with the doctor.  2200: pt used urinal then to bed with assist of 2.  Pt able to walk to bed but needed help of 2 people to get in the bed. Powder applied to folds. Pt on right side states it is the only way he will sleep.  R.t called to put cpap on for the night  2300: pt asleep, cpap on. Pt appears comfortable. resp even and unlaored     Face to face report given with opportunity to observe patient.     Report given to paul santa   7/29/2017  11:25 PM          "

## 2017-07-31 ENCOUNTER — APPOINTMENT (OUTPATIENT)
Dept: PHYSICAL THERAPY | Facility: HOSPITAL | Age: 82
End: 2017-07-31
Payer: MEDICARE

## 2017-07-31 VITALS
RESPIRATION RATE: 20 BRPM | TEMPERATURE: 97 F | WEIGHT: 315 LBS | BODY MASS INDEX: 53.81 KG/M2 | DIASTOLIC BLOOD PRESSURE: 67 MMHG | SYSTOLIC BLOOD PRESSURE: 182 MMHG | HEART RATE: 71 BPM | OXYGEN SATURATION: 95 %

## 2017-07-31 LAB
ANION GAP SERPL CALCULATED.3IONS-SCNC: 8 MMOL/L (ref 3–14)
BUN SERPL-MCNC: 40 MG/DL (ref 7–30)
CALCIUM SERPL-MCNC: 9 MG/DL (ref 8.5–10.1)
CHLORIDE SERPL-SCNC: 107 MMOL/L (ref 94–109)
CO2 SERPL-SCNC: 28 MMOL/L (ref 20–32)
CREAT SERPL-MCNC: 1.55 MG/DL (ref 0.66–1.25)
GFR SERPL CREATININE-BSD FRML MDRD: 43 ML/MIN/1.7M2
GLUCOSE BLDC GLUCOMTR-MCNC: 122 MG/DL (ref 70–99)
GLUCOSE BLDC GLUCOMTR-MCNC: 80 MG/DL (ref 70–99)
GLUCOSE SERPL-MCNC: 97 MG/DL (ref 70–99)
POTASSIUM SERPL-SCNC: 4.7 MMOL/L (ref 3.4–5.3)
SODIUM SERPL-SCNC: 143 MMOL/L (ref 133–144)

## 2017-07-31 PROCEDURE — 25000132 ZZH RX MED GY IP 250 OP 250 PS 637: Mod: GY | Performed by: NURSE PRACTITIONER

## 2017-07-31 PROCEDURE — 99217 ZZC OBSERVATION CARE DISCHARGE: CPT | Performed by: NURSE PRACTITIONER

## 2017-07-31 PROCEDURE — 40000193 ZZH STATISTIC PT WARD VISIT

## 2017-07-31 PROCEDURE — 80048 BASIC METABOLIC PNL TOTAL CA: CPT | Performed by: NURSE PRACTITIONER

## 2017-07-31 PROCEDURE — A9270 NON-COVERED ITEM OR SERVICE: HCPCS | Mod: GY | Performed by: INTERNAL MEDICINE

## 2017-07-31 PROCEDURE — 36415 COLL VENOUS BLD VENIPUNCTURE: CPT | Performed by: NURSE PRACTITIONER

## 2017-07-31 PROCEDURE — 97116 GAIT TRAINING THERAPY: CPT | Mod: GP

## 2017-07-31 PROCEDURE — G0378 HOSPITAL OBSERVATION PER HR: HCPCS

## 2017-07-31 PROCEDURE — 00000146 ZZHCL STATISTIC GLUCOSE BY METER IP

## 2017-07-31 PROCEDURE — 25000132 ZZH RX MED GY IP 250 OP 250 PS 637: Mod: GY | Performed by: INTERNAL MEDICINE

## 2017-07-31 PROCEDURE — A9270 NON-COVERED ITEM OR SERVICE: HCPCS | Mod: GY | Performed by: NURSE PRACTITIONER

## 2017-07-31 RX ORDER — TRAMADOL HYDROCHLORIDE 50 MG/1
50 TABLET ORAL EVERY 6 HOURS PRN
Qty: 20 TABLET | Refills: 0 | Status: SHIPPED | OUTPATIENT
Start: 2017-07-31 | End: 2017-08-07

## 2017-07-31 RX ORDER — TRAMADOL HYDROCHLORIDE 50 MG/1
50 TABLET ORAL EVERY 6 HOURS PRN
Status: DISCONTINUED | OUTPATIENT
Start: 2017-07-31 | End: 2017-07-31 | Stop reason: HOSPADM

## 2017-07-31 RX ADMIN — SERTRALINE HYDROCHLORIDE 100 MG: 100 TABLET ORAL at 09:17

## 2017-07-31 RX ADMIN — CAPTOPRIL 100 MG: 25 TABLET ORAL at 09:17

## 2017-07-31 RX ADMIN — TRAMADOL HYDROCHLORIDE 50 MG: 50 TABLET, COATED ORAL at 10:07

## 2017-07-31 RX ADMIN — LEVETIRACETAM 1000 MG: 500 TABLET ORAL at 09:17

## 2017-07-31 RX ADMIN — METOPROLOL TARTRATE 25 MG: 25 TABLET ORAL at 09:17

## 2017-07-31 RX ADMIN — CAPTOPRIL 100 MG: 25 TABLET ORAL at 13:23

## 2017-07-31 RX ADMIN — MICONAZOLE NITRATE: 2 POWDER TOPICAL at 09:17

## 2017-07-31 RX ADMIN — ALLOPURINOL 300 MG: 300 TABLET ORAL at 09:17

## 2017-07-31 RX ADMIN — INSULIN ASPART 8 UNITS: 100 INJECTION, SOLUTION INTRAVENOUS; SUBCUTANEOUS at 09:17

## 2017-07-31 RX ADMIN — DOCUSATE SODIUM 100 MG: 100 CAPSULE, LIQUID FILLED ORAL at 09:16

## 2017-07-31 RX ADMIN — METFORMIN HYDROCHLORIDE 500 MG: 500 TABLET ORAL at 09:16

## 2017-07-31 RX ADMIN — ASPIRIN 81 MG: 81 TABLET, COATED ORAL at 09:16

## 2017-07-31 RX ADMIN — POLYETHYLENE GLYCOL 3350 17 G: 17 POWDER, FOR SOLUTION ORAL at 09:16

## 2017-07-31 NOTE — DISCHARGE SUMMARY
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 He denies any trauma or acute injury. He sleeps on his right side only.     Hospital Course   Trey Washington was admitted on 7/28/2017.  The following problems were addressed during his hospitalization:    Right hip pain,subacute:Possibly due to his obesity plus sleep position. He cannot recall how old his mattress, at least 10 years old. He reports he switched out his mattress for one from another room which is also at least 10 years old but newer than his previous one. Pain has been going on for several weeks to months but got suddenly more sever over the preceding 3-4 days to the point he felt his leg would buckle and he would fall. He was started on oral celebrex but refused it due to his CKD. He also refuses any narcotic medications because they \"make me goofy\", he did agree to a tramadol and tolerated this well. His pain improved enough that he was able to ambulate 50 feet and go up stairs so he is discharged home and will follow-up with his PCP for further care.    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    Discharge Orders     Reason for your hospital stay   Right hip pain     Follow-up and recommended labs and tests    Follow up with primary care provider, Moreno Desai, within 7 days for hospital follow- up.  No follow up labs or test are needed.     Activity   Your activity upon discharge: activity as tolerated     Discharge Instructions   Continue home o2 as previously prescribed. Continue CPAP while sleeping.     DNR (Do Not Resuscitate)     Diet   Follow this diet upon discharge: Orders Placed This Encounter     Moderate Consistent CHO Diet       Discharge Medications   Current Discharge Medication List      START taking these medications    Details   traMADol (ULTRAM) 50 MG tablet Take 1 tablet (50 mg) by mouth every 6 hours as needed for moderate pain  Qty: 20 tablet, Refills: 0    Associated Diagnoses: Right hip pain         CONTINUE these medications which have NOT CHANGED    Details   METOPROLOL TARTRATE PO Take 25 mg by mouth 2 times daily      NOVOLOG FLEXPEN 100 UNIT/ML soln INJECT 8 UNITS SUBCUTANEOUS BEFORE BEDTIME IN ADDITION TO SLIDING SCALE  Qty: 9 mL, Refills: 0    Associated Diagnoses: Type 1 diabetes, HbA1c goal < 7% (H)      DOCQLACE 100 MG capsule TAKE 1 CAPSULE BY MOUTH TWICE DAILY  Qty: 100 capsule, Refills: 1    Comments: This prescription was filled on 7/24/2017. Any refills authorized will be placed on file.  Associated Diagnoses: Constipation      !! cyanocolbalamin (VITAMIN  B-12) 500 MCG tablet TAKE 2 TABLETS (1,000 MCG) BY MOUTH DAILY  Qty: 180 tablet, Refills: 1    Associated Diagnoses: Vitamin deficiency      LANTUS SOLOSTAR 100 UNIT/ML soln INJECT 18 UNITS SUBCUTANEOUSLY EVERY  DAY AT BEDTIME  Qty: 15 mL, Refills: 0    Associated Diagnoses: Type 2 diabetes mellitus without complication (H)      levETIRAcetam 1000 MG TABS TAKE 1 TABLET BY MOUTH TWICE DAILY  Qty: 60 tablet, Refills: 0    Comments: This prescription was filled on 6/27/2017. Any refills authorized will be placed on file.  Associated Diagnoses: Seizure disorder (H)      !! cyanocolbalamin (VITAMIN  B-12) 500 MCG tablet TAKE 2 TABLETS (1,000 MCG) BY MOUTH DAILY  Qty: 180 tablet, Refills: 0    Associated Diagnoses: Vitamin deficiency      allopurinol (ZYLOPRIM) 300 MG tablet TAKE 1 TABLET BY MOUTH EVERY DAY  Qty: 90 tablet, Refills: 0    Associated Diagnoses: Idiopathic gout, unspecified chronicity, unspecified site      metFORMIN (GLUCOPHAGE) 1000 MG tablet TAKE ONE TABLET BY MOUTH EVERY DAY WITH A MEAL  Qty: 90 tablet, Refills: 0    Associated Diagnoses: Diabetes mellitus, type 2 (H)      terazosin (HYTRIN) 10 MG capsule TAKE 1 CAPSULE BY MOUTH NIGHTLY AT BEDTIME  Qty: 90 capsule, Refills: 0    Comments: This prescription was filled today(4/25/2017). Any refills authorized will be placed on file.  Associated Diagnoses: Essential hypertension      captopril (CAPOTEN) 50 MG tablet Take 2 tablets (100 mg) by mouth 3 times daily  Qty: 270 tablet, Refills: 3    Associated Diagnoses: Essential hypertension      fish oil-omega-3 fatty acids 1000 MG capsule Take 1,000 mg by mouth daily      simvastatin (ZOCOR) 10 MG tablet TAKE 1 TABLET (10 MG) BY MOUTH AT BEDTIME  Qty: 90 tablet, Refills: 1    Associated Diagnoses: Hypercholesterolemia      sertraline (ZOLOFT) 100 MG tablet TAKE 1 TABLET BY MOUTH ONCE DAILY  Qty: 90 tablet, Refills: 3    Associated Diagnoses: Dysthymia      polyethylene glycol (MIRALAX/GLYCOLAX) powder TAKE 17 GRAMS BY MOUTH DAILY MIXED AS DIRECTED.  Qty: 527 g, Refills: 6    Associated Diagnoses: Constipation      Cholecalciferol (VITAMIN D HIGH POTENCY) 1000 UNITS CAPS TAKE 1 CAPSULE BY MOUTH DAILY  Qty: 90 capsule,  "Refills: 3    Associated Diagnoses: Vitamin D deficiency      Ascorbic Acid (VITAMIN C PO) Take by mouth daily      vitamin E (TOCOPHEROL) 100 UNIT capsule Take 100 Units by mouth daily Uncertain of dose      !! order for DME Equipment being ordered: Oxygen 2 LPM per nasal cannula during the day, portable also  Qty: 1 each, Refills: 11    Associated Diagnoses: Hypoxia      calcium carbonate (OS-MARILIN 500 MG [place]) 500 MG tablet Take 1 tablet (500 mg) by mouth 2 times daily  Qty: 180 tablet, Refills: 1    Associated Diagnoses: HTN (hypertension)      UNABLE TO FIND CPAP      aspirin 81 MG tablet Take 1 tablet by mouth daily      NOVOFINE 30G X 8 MM insulin pen needle USE 4 PENS NEEDLES DAILY OR AS DIRECTED  Qty: 100 each, Refills: 6    Comments: This prescription was filled on 7/6/2017. Any refills authorized will be placed on file.  Associated Diagnoses: DM ketoacidosis type I (H)      Lancets Misc. (UNISTIK 3 NORMAL) MISC USE TO TEST BLOOD SUGARS TWO TIMES A DAY  Qty: 100 each, Refills: 2    Associated Diagnoses: Diabetes mellitus, type 2 (H)      Acetaminophen (TYLENOL PO) Take 500 mg by mouth Takes 2 tablets at HS      blood glucose monitoring (KEO CONTOUR MONITOR) meter device kit Use to test blood sugars 2 times daily or as directed.  Qty: 1 kit, Refills: 0    Associated Diagnoses: IDDM (insulin dependent diabetes mellitus) (H)      blood glucose monitoring (KEO CONTOUR) test strip Use to test blood sugars 2 times daily or as directed.  Qty: 200 each, Refills: 3    Associated Diagnoses: IDDM (insulin dependent diabetes mellitus) (H)      blood glucose (KEO MICROLET 2) lancing device Use to test blood sugars 2 times daily or as directed.  Qty: 200 each, Refills: 3    Associated Diagnoses: IDDM (insulin dependent diabetes mellitus) (H)      Silver-Carboxymethylcellulose (AQUACEL-AG EXTRA HYDROFIBER) 4\"X5\" PADS Externally apply 0.25 each topically every other day Use as directed by wound center  Qty: 5 " each, Refills: 3    Comments: Ok to substitute any silver hydrofiber/gelling fiber.  Associated Diagnoses: Open wound of abdomen      !! order for DME 4x4 bulk SOFT gauze (disp 1 sleeve, refill x 5)  Qty: 1 Units, Refills: 0    Associated Diagnoses: Ulcer of skin (H)      miconazole (MICATIN) 2 % AERP Apply 113 g topically 2 times daily  Qty: 1 each, Refills: 3    Associated Diagnoses: Fungal infection      !! order for DME Equipment being ordered: Meplilex Transfer 6 X 8 inch (604905) - disp 3;  Aquacel AG 4 X 5 ([id number]); disp 3 - Refills 6  Qty: 1 each, Refills: 6    Associated Diagnoses: Skin ulcer, limited to breakdown of skin (H)      !! order for DME Equipment being ordered: Wheelchair  Qty: 1 each, Refills: 0    Associated Diagnoses: Morbid obesity, unspecified obesity type (H)      Elastic Bandages & Supports (T.E.D. BELOW KNEE/L-REGULAR) MISC Apply in am and take off in the evening  Qty: 6 each, Refills: 11    Associated Diagnoses: Swelling of limb      ketoconazole (NIZORAL) 2 % cream Apply topically 2 times daily  Qty: 120 g, Refills: 1    Associated Diagnoses: Tinea cruris       !! - Potential duplicate medications found. Please discuss with provider.        Allergies   Allergies   Allergen Reactions     Hydrocodone      Intolerance       Penicillins Swelling     Data   Most Recent 3 CBC's:  Recent Labs   Lab Test  07/28/17   1904  04/03/17   0944  03/20/17   1138   WBC  7.7  5.5  5.9   HGB  13.0*  10.5*  9.0*   MCV  95  99  100   PLT  149*  165  221      Most Recent 3 BMP's:  Recent Labs   Lab Test  07/31/17   0537  07/30/17   0735  07/29/17   1330  04/03/17   0944   NA  143  143   --   142   POTASSIUM  4.7  4.6   --   4.1   CHLORIDE  107  106   --   104   CO2  28  31   --   27   BUN  40*  39*   --   32*   CR  1.55*  1.63*  1.59*  1.61*   ANIONGAP  8  6   --   11   MARILIN  9.0  9.3   --   9.4   GLC  97  99   --   113*     Most Recent 2 LFT's:  Recent Labs   Lab Test  03/11/17   1900  04/18/16   1118   AST   14  13   ALT  12  13   ALKPHOS  41  52   BILITOTAL  0.5  0.5     Most Recent INR's and Anticoagulation Dosing History:  Anticoagulation Dose History     Recent Dosing and Labs Latest Ref Rng & Units 3/4/2017    INR 0.80 - 1.20 1.12        Most Recent 3 Troponin's:  Recent Labs   Lab Test  03/11/17   1900  09/15/15   1725   TROPI  0.032  <0.015  The 99th percentile for upper reference range is 0.045 ug/L.  Troponin values in   the range of 0.045 - 0.120 ug/L may be associated with risks of adverse   clinical events.       Most Recent Cholesterol Panel:  Recent Labs   Lab Test  04/03/17   0944   CHOL  182   LDL  103*   HDL  48   TRIG  155*     Most Recent 6 Bacteria Isolates From Any Culture (See EPIC Reports for Culture Details):  Recent Labs   Lab Test  07/29/17   0025  11/20/15   0951  01/07/14   1145   CULT  No MRSA isolated  No growth after 7 days  50,000 to 100,000 colonies/mL Proteus mirabilis*     Most Recent TSH, T4 and A1c Labs:  Recent Labs   Lab Test  04/03/17   0944  03/11/17   1900  10/05/16   0944   TSH   --   1.93  5.19*   T4   --    --   0.93   A1C  5.1   --   6.1*     Results for orders placed or performed during the hospital encounter of 07/28/17   XR Pelvis including Hip Right 2-3 Views    Narrative    PELVIS AND RIGHT HIP    FINDINGS:  AP view of the pelvis and two views of the right hip were  obtained.    No fracture or dislocation is seen.  There is moderately-severe  narrowing of hip joint spaces bilaterally.  There is no focal osseous  lesion.  There is a left convex scoliosis with degenerative change in  the lower lumbar spine.  Exam Date: Jul 28, 2017 07:34:00 PM  Author: MICHELLE DAVEY  This report is final and signed     CT Hip Right w/o Contrast    Narrative    CT SCAN OF RIGHT HIP    TECHNIQUE:  CT scan of right hip with sagittal and coronal  reconstructions.    REPORT:  The right hemipelvis is intact. The acetabulum appears  normal.  The right proximal femur appears normal.  No soft  tissue  masses are seen.    IMPRESSION:  NO EVIDENCE OF HIP FRACTURE.  Exam Date: Jul 28, 2017 09:20:35 PM  Author: RAFAEL GUPTA  This report is final and signed

## 2017-07-31 NOTE — PROGRESS NOTES
Name: Trey Washington    MRN#: [id number]    Reason for Hospitalization: Hip pain, right [M25.551]    EVELYN: none    Discharge Date: 7/31/2017    Patient / Family response to discharge plan: agreeable    Follow-Up Appt: Future Appointments  Date Time Provider Department Center   8/7/2017 11:00 AM Moreno Desai MD [facility]       Other Providers (Care Coordinator, County Services, PCA services etc): Yes: [facility] for PT    Discharge Disposition: home    Krystyna Arreaga RN

## 2017-07-31 NOTE — PLAN OF CARE
Problem: Goal Outcome Summary  Goal: Goal Outcome Summary  OBS Goal: Diagnostics completed and resulted, VSS. Pain controlled and safe discharge plan   GAIT/AMBULATION: Ascended/descended 4 stairs c CGA using bilateral rails. SpO2 96% on 4L   EQUIPMENT NEEDS AT DISCHARGE: NA  D/C RECOMMENDATIONS: Pt is d/c home c home PT  TREATMENT AT D/C: Continued PT

## 2017-07-31 NOTE — PLAN OF CARE
Problem: Goal Outcome Summary  Goal: Goal Outcome Summary  OBS Goal: Diagnostics completed and resulted, VSS. Pain controlled and safe discharge plan   Outcome: No Change  Pt up in chair most of shift. States pain in right hip is minimal. Eating well. accuchecks as charted. Pt had hs snack tonight.in bed now with cpap on.  Uses urinal with help. Assist of 2 to bed. Pt feels that he is improving everyday.  Wants to go home.  Face to face report given with opportunity to observe patient.     Report given to paul day   7/31/2017  12:13 AM

## 2017-07-31 NOTE — PLAN OF CARE
Problem: Goal Outcome Summary  Goal: Goal Outcome Summary  OBS Goal: Diagnostics completed and resulted, VSS. Pain controlled and safe discharge plan   Outcome: Improving  Vitals: VSS, O2 sat 95% on CPAP  Pain: Denies  Orientation: Alert and Oriented x 3   Cardiac: WNL  Lungs: Dim  Bowels: Active  Urinating: continent, but needs urinal placed, pt was able to assist.   Skin: Redness in groin fold.  IV Fluids: None  Antibiotics: None  Mobility: Assist of 2, gait belt and walker  Safety: Alarms active, and call light with in reach, uses call light appropriately.   Comment:  Pt slept most the night, did state that the cpap was uncomfortable, several attempts were made to adjust it for him, RT was called, no success. Pt did keep it on. Pt has had no complaints of pain or requested any thing for it.      Face to face report given with opportunity to observe patient.     Report given to LON Thayer and LON Mckinney RN  7/31/2017  7:48 AM

## 2017-07-31 NOTE — PLAN OF CARE
Ambulated 50' in hallway with SBA. States pain in R) hip 3/10 with activity. Did take Ultram earlier. Call to PT to arrange trial of stairs. PT will come at 3pm to do stairs with pt. Veronica and LANA aware .

## 2017-07-31 NOTE — PROGRESS NOTES
07/29/17 0900   Quick Adds   Type of Visit Initial PT Evaluation   Living Environment   Lives With alone   Living Arrangements house   Home Accessibility stairs to enter home   Number of Stairs to Enter Home 3   Number of Stairs Within Home 0   Stair Railings at Home outside, present at both sides   Self-Care   Usual Activity Tolerance fair   Current Activity Tolerance poor   Equipment Currently Used at Home cane, straight   Functional Level Prior   Ambulation 1-->assistive equipment   Transferring 1-->assistive equipment   Toileting 1-->assistive equipment   Fall history within last six months yes   Number of times patient has fallen within last six months 1   Which of the above functional risks had a recent onset or change? ambulation;transferring;fall history   Prior Functional Level Comment Pt reports he   General Information   Onset of Illness/Injury or Date of Surgery - Date 07/28/17   Referring Physician Dr. Catalan   Pertinent History of Current Problem (include personal factors and/or comorbidities that impact the POC) Pt hospitalized due to hip pain and leg giving out for past few days. PMH includes CKD, morbid obesity, sleep apnea, DM.  X-ray was negative   Precautions/Limitations fall precautions;oxygen therapy device and L/min  (3.5L)   Weight-Bearing Status - LLE full weight-bearing   Weight-Bearing Status - RLE full weight-bearing   Cognitive Status Examination   Orientation orientation to person, place and time   Level of Consciousness alert   Follows Commands and Answers Questions 100% of the time   Personal Safety and Judgment intact   Memory intact   Pain Assessment   Patient Currently in Pain Yes, see Vital Sign flowsheet   Posture    Posture Forward head position;Protracted shoulders   Range of Motion (ROM)   ROM Quick Adds No deficits were identified   Strength   Strength Comments BLE strength grossly 4- to 4/5. No significant strength difference R vs L   Bed Mobility   Bed Mobility Bed  mobility skill: Supine to sit   Bed Mobility Skill: Supine to Sit   Level of [place]: Supine/Sit moderate assist (50% patients effort)   Physical Assist/Nonphysical Assist: Supine/Sit 1 person assist   Assistive Device: Supine/Sit bed rails   Transfer Skills   Transfer Transfer Skill: Sit to Stand   Transfer Skill:  Sit to Stand   Level of [place]: Sit/Stand minimum assist (75% patients effort)   Physical Assist/Nonphysical Assist: Sit/Stand 1 person + 1 person to manage equipment   Weightbearing Restrictions: Sit/Stand full weight-bearing   Assistive Device for Transfer: Sit/Stand rolling walker   Gait   Gait Gait Skill   Gait Skills   Level of [place]: Gait minimum assist (75% patients effort)   Physical Assist/Nonphysical Assist: Gait 1 person + 1 person to manage equipment   Weight-Bearing Restrictions: Gait full weight-bearing   Assistive Device for Transfer: Gait rolling walker   Gait Distance (10ft)   Sensory Examination   Sensory Perception no deficits were identified   Coordination   Coordination no deficits were identified   Muscle Tone   Muscle Tone no deficits were identified   General Therapy Interventions   Planned Therapy Interventions gait training   Clinical Impression   Criteria for Skilled Therapeutic Intervention evaluation only  (And one treatment)   PT Diagnosis Difficulty c walking, pain limiting function, decreased activity tolerance   Influenced by the following impairments Difficulty c walking, pain limiting function, decreased activity tolerance   Functional limitations due to impairments Decreased safety and independence c functional mobility tasks, increased fall risk   Clinical Presentation Stable/Uncomplicated   Clinical Presentation Rationale PMH DM, obesity, CKD   Clinical Decision Making (Complexity) Low complexity   Predicted Duration of Therapy Intervention (days/wks) 7 days   Anticipated Discharge Disposition Transitional Care Facility   Risk & Benefits of therapy  "have been explained Yes   Patient, Family & other staff in agreement with plan of care Yes   Clinical Impression Comments Pt admitted due to R hip pain and decreased ability to ambulate. Today pt required moderate assist for bed mobility, min A x2 for transfers and was able to ambulate 10ft c FWW and min A on 3.5L O2. Based on pt's current status he would not be able to return home. Pt's home has 3 stairs to enter which pt would not be safe attempting right now.  He lives alone but does get some PCA help during the day. Currently recommending d/c to TCU.  Addendum: Pt has a significant change in function 7/31/17 and was able to ambulate 50ft c nursing. Received new orders for gait training on stairs for safe d/c home. Will complete eval + 1 treatment   [facility]-PAC TM \"6 Clicks\"   2016, Trustees of [facility], under license to [employer].  All rights reserved.   6 Clicks Short Forms Basic Mobility Inpatient Short Form   [facility] AM-PAC  \"6 Clicks\" V.2 Basic Mobility Inpatient Short Form   1. Turning from your back to your side while in a flat bed without using bedrails? 3 - A Little   2. Moving from lying on your back to sitting on the side of a flat bed without using bedrails? 2 - A Lot   3. Moving to and from a bed to a chair (including a wheelchair)? 3 - A Little   4. Standing up from a chair using your arms (e.g., wheelchair, or bedside chair)? 3 - A Little   5. To walk in hospital room? 3 - A Little   6. Climbing 3-5 steps with a railing? 2 - A Lot   Basic Mobility Raw Score (Score out of 24.Lower scores equate to lower levels of function) 16   Total Evaluation Time   Total Evaluation Time (Minutes) 15     I certify the need for these services furnished under this plan of treatment and while under my care. (Physician co-signature of this document indicates review and certification of the therapy plan).      "

## 2017-07-31 NOTE — PLAN OF CARE
Problem: Goal Outcome Summary  Goal: Goal Outcome Summary  OBS Goal: Diagnostics completed and resulted, VSS. Pain controlled and safe discharge plan   Outcome: Improving  Pt up SBA. Pt medically ready for discharge, just needs to get signed off by PT. Pt ambulated over 50 feet in the halls today and tolerated well. Prn ultram for pain with good relief. Pt to discharge to home with pca as previously. BP (!) 182/67  Pulse 71  Temp 97  F (36.1  C) (Oral)  Resp 20  Wt (!) 170.1 kg (375 lb)  SpO2 95%  BMI 53.81 kg/m2.

## 2017-08-01 ENCOUNTER — TELEPHONE (OUTPATIENT)
Dept: CASE MANAGEMENT | Facility: HOSPITAL | Age: 82
End: 2017-08-01

## 2017-08-01 NOTE — TELEPHONE ENCOUNTER
Trey Washington, was discharged to home on 7/31/17  from [facility]. I spoke today with Trey regarding his  discharge.   He  indicates he  receive(d) sufficient information upon discharge. Medications were reviewed in full on discharge, including: Medications to be started;  medications to be continued from preadmission and any side effects. Prescriptions sent with Trey to be filled. Medications are being taken as prescribed.   He  indicates he has  an appointment scheduled for Aug 7  and has  transportation available. He was able to confirm his  appointment time and has  it written down.   He  did not have any questions regarding discharge instructions or condition.  Per their request, the following employee (s) can be recognized for their outstanding services delivered:  Care was good.  Suggestions to improve service: nothing indicated  He was informed he  may receive a survey in the mail from the hospital. Asked if he  would kindly complete the survey in order for [facility] to know if services fully met patient needs.

## 2017-08-01 NOTE — PLAN OF CARE
Patient discharged at 4:00 PM via wheel chair accompanied by other:caregiver  and staff. Prescriptions sent with patient to fill . All belongings sent with patient.     Discharge instructions reviewed with caregiver and pt. Listed belongings gathered and returned to patient.     Patient discharged to home.       Core Measures and Vaccines  Core Measures applicable during stay: No.   Pneumonia and Influenza given prior to discharge, if indicated: N/A    Surgical Patient   Surgical Procedures during stay: no  Did patient receive discharge instruction on wound care and recognition of infection symptoms? N/A    MISC  Follow up appointment made:  Yes  Home and hospital aquired medications returned to patient: Yes  Patient reports pain was well managed at discharge: Yes    Pt discharged to home with POA/caregiver

## 2017-08-07 ENCOUNTER — OFFICE VISIT (OUTPATIENT)
Dept: FAMILY MEDICINE | Facility: OTHER | Age: 82
End: 2017-08-07
Attending: FAMILY MEDICINE
Payer: COMMERCIAL

## 2017-08-07 VITALS
WEIGHT: 315 LBS | DIASTOLIC BLOOD PRESSURE: 84 MMHG | HEART RATE: 84 BPM | OXYGEN SATURATION: 89 % | BODY MASS INDEX: 45.1 KG/M2 | HEIGHT: 70 IN | SYSTOLIC BLOOD PRESSURE: 138 MMHG

## 2017-08-07 DIAGNOSIS — E11.9 CONTROLLED TYPE 2 DIABETES MELLITUS WITHOUT COMPLICATION, WITH LONG-TERM CURRENT USE OF INSULIN (H): ICD-10-CM

## 2017-08-07 DIAGNOSIS — Z79.4 CONTROLLED TYPE 2 DIABETES MELLITUS WITHOUT COMPLICATION, WITH LONG-TERM CURRENT USE OF INSULIN (H): ICD-10-CM

## 2017-08-07 DIAGNOSIS — E55.9 VITAMIN D DEFICIENCY: ICD-10-CM

## 2017-08-07 DIAGNOSIS — M25.50 PAIN IN JOINT, MULTIPLE SITES: Primary | ICD-10-CM

## 2017-08-07 DIAGNOSIS — E66.01 MORBID (SEVERE) OBESITY DUE TO EXCESS CALORIES (H): ICD-10-CM

## 2017-08-07 DIAGNOSIS — M25.551 RIGHT HIP PAIN: ICD-10-CM

## 2017-08-07 PROCEDURE — 99214 OFFICE O/P EST MOD 30 MIN: CPT | Performed by: FAMILY MEDICINE

## 2017-08-07 PROCEDURE — 99212 OFFICE O/P EST SF 10 MIN: CPT | Performed by: COUNSELOR

## 2017-08-07 RX ORDER — TRAMADOL HYDROCHLORIDE 50 MG/1
50 TABLET ORAL EVERY 8 HOURS PRN
Qty: 90 TABLET | Refills: 3 | Status: ON HOLD | OUTPATIENT
Start: 2017-08-07 | End: 2017-12-22

## 2017-08-07 ASSESSMENT — ANXIETY QUESTIONNAIRES
5. BEING SO RESTLESS THAT IT IS HARD TO SIT STILL: NOT AT ALL
7. FEELING AFRAID AS IF SOMETHING AWFUL MIGHT HAPPEN: NOT AT ALL
IF YOU CHECKED OFF ANY PROBLEMS ON THIS QUESTIONNAIRE, HOW DIFFICULT HAVE THESE PROBLEMS MADE IT FOR YOU TO DO YOUR WORK, TAKE CARE OF THINGS AT HOME, OR GET ALONG WITH OTHER PEOPLE: NOT DIFFICULT AT ALL
6. BECOMING EASILY ANNOYED OR IRRITABLE: NOT AT ALL
2. NOT BEING ABLE TO STOP OR CONTROL WORRYING: NOT AT ALL
1. FEELING NERVOUS, ANXIOUS, OR ON EDGE: NOT AT ALL
GAD7 TOTAL SCORE: 0
3. WORRYING TOO MUCH ABOUT DIFFERENT THINGS: NOT AT ALL

## 2017-08-07 ASSESSMENT — PAIN SCALES - GENERAL: PAINLEVEL: NO PAIN (0)

## 2017-08-07 ASSESSMENT — PATIENT HEALTH QUESTIONNAIRE - PHQ9
5. POOR APPETITE OR OVEREATING: NOT AT ALL
SUM OF ALL RESPONSES TO PHQ QUESTIONS 1-9: 0

## 2017-08-07 NOTE — NURSING NOTE
"Chief Complaint   Patient presents with     [facility] F/U     ED 7/28/2017 Right Hip pain       Initial /84  Pulse 84  Ht 5' 10\" (1.778 m)  Wt (!) 375 lb (170.1 kg)  SpO2 (!) 89%  BMI 53.81 kg/m2 Estimated body mass index is 53.81 kg/(m^2) as calculated from the following:    Height as of this encounter: 5' 10\" (1.778 m).    Weight as of this encounter: 375 lb (170.1 kg).  Medication Reconciliation: complete       Ina Lord      "

## 2017-08-07 NOTE — MR AVS SNAPSHOT
After Visit Summary   8/7/2017    Trey Washington    MRN: [id number]           Patient Information     Date Of Birth          1935        Visit Information        Provider Department      8/7/2017 11:00 AM Moreno Desai MD [facility]        Today's Diagnoses     Pain in joint, multiple sites    -  1    Right hip pain        Morbid (severe) obesity due to excess calories (H)        Controlled type 2 diabetes mellitus without complication, with long-term current use of insulin (H)          Care Instructions    F/u with ongoing concerns.           Follow-ups after your visit        Additional Services     HOME CARE NURSING REFERRAL       **Order classes of: [facility], [facility] and [facility] will route to the Home Care and Hospice Referral Pool.  Home Care or Hospice will then contact the patient to schedule their appointment.**    If you do not hear from Home Care and Hospice, or you would like to call to schedule, please call the referring place of service that your provider has listed below.  ______________________________________________________________________    Your provider has referred you to: [facility] [phone]   [email or web address]    Extended Emergency Contact Information  Primary Emergency Contact: EVERT RODRIGUEZ   [facility]  Home Phone: [phone]  Mobile Phone: [phone]  Relation: Other  Secondary Emergency Contact: No secondary contact   [place]  Relation: Other    Patient Anticipated Discharge Date: n/a   RN, PT, HHA to begin 24 - 48 hours after discharge.  PLEASE EVALUATE AND TREAT (Evaluation timeline is 24 - 48 hrs. Please call if there is need for a variance to this timeline).    REASON FOR REFERRAL: Assessment & Treatment: PT    ADDITIONAL SERVICES NEEDED: as indicated.     OTHER PERTINENT INFORMATION: Patient was last seen by provider on 8/7/2017   for right  hip pain, severe polyarthralgia and morbid obesity.  .    Current Outpatient Prescriptions:  traMADol (ULTRAM) 50 MG tablet, Take 1 tablet (50 mg) by mouth every 6 hours as needed for moderate pain, Disp: 20 tablet, Rfl: 0  METOPROLOL TARTRATE PO, Take 25 mg by mouth 2 times daily, Disp: , Rfl:   NOVOLOG FLEXPEN 100 UNIT/ML soln, INJECT 8 UNITS SUBCUTANEOUS BEFORE BEDTIME IN ADDITION TO SLIDING SCALE, Disp: 9 mL, Rfl: 0  DOCQLACE 100 MG capsule, TAKE 1 CAPSULE BY MOUTH TWICE DAILY, Disp: 100 capsule, Rfl: 1  cyanocolbalamin (VITAMIN  B-12) 500 MCG tablet, TAKE 2 TABLETS (1,000 MCG) BY MOUTH DAILY, Disp: 180 tablet, Rfl: 1  NOVOFINE 30G X 8 MM insulin pen needle, USE 4 PENS NEEDLES DAILY OR AS DIRECTED, Disp: 100 each, Rfl: 6  LANTUS SOLOSTAR 100 UNIT/ML soln, INJECT 18 UNITS SUBCUTANEOUSLY EVERY DAY AT BEDTIME, Disp: 15 mL, Rfl: 0  levETIRAcetam 1000 MG TABS, TAKE 1 TABLET BY MOUTH TWICE DAILY, Disp: 60 tablet, Rfl: 0  cyanocolbalamin (VITAMIN  B-12) 500 MCG tablet, TAKE 2 TABLETS (1,000 MCG) BY MOUTH DAILY, Disp: 180 tablet, Rfl: 0  allopurinol (ZYLOPRIM) 300 MG tablet, TAKE 1 TABLET BY MOUTH EVERY DAY, Disp: 90 tablet, Rfl: 0  metFORMIN (GLUCOPHAGE) 1000 MG tablet, TAKE ONE TABLET BY MOUTH EVERY DAY WITH A MEAL, Disp: 90 tablet, Rfl: 0  Lancets Misc. (UNISTIK 3 NORMAL) MISC, USE TO TEST BLOOD SUGARS TWO TIMES A DAY, Disp: 100 each, Rfl: 2  terazosin (HYTRIN) 10 MG capsule, TAKE 1 CAPSULE BY MOUTH NIGHTLY AT BEDTIME, Disp: 90 capsule, Rfl: 0  captopril (CAPOTEN) 50 MG tablet, Take 2 tablets (100 mg) by mouth 3 times daily, Disp: 270 tablet, Rfl: 3  fish oil-omega-3 fatty acids 1000 MG capsule, Take 1,000 mg by mouth daily, Disp: , Rfl:   simvastatin (ZOCOR) 10 MG tablet, TAKE 1 TABLET (10 MG) BY MOUTH AT BEDTIME, Disp: 90 tablet, Rfl: 1  sertraline (ZOLOFT) 100 MG tablet, TAKE 1 TABLET BY MOUTH ONCE DAILY, Disp: 90 tablet, Rfl: 3  polyethylene glycol (MIRALAX/GLYCOLAX) powder, TAKE 17 GRAMS BY MOUTH DAILY MIXED AS  "DIRECTED., Disp: 527 g, Rfl: 6  Cholecalciferol (VITAMIN D HIGH POTENCY) 1000 UNITS CAPS, TAKE 1 CAPSULE BY MOUTH DAILY, Disp: 90 capsule, Rfl: 3  Acetaminophen (TYLENOL PO), Take 500 mg by mouth Takes 2 tablets at HS, Disp: , Rfl:   blood glucose monitoring (KEO CONTOUR MONITOR) meter device kit, Use to test blood sugars 2 times daily or as directed., Disp: 1 kit, Rfl: 0  blood glucose monitoring (KEO CONTOUR) test strip, Use to test blood sugars 2 times daily or as directed., Disp: 200 each, Rfl: 3  blood glucose (KEO MICROLET 2) lancing device, Use to test blood sugars 2 times daily or as directed., Disp: 200 each, Rfl: 3  Silver-Carboxymethylcellulose (AQUACEL-AG EXTRA HYDROFIBER) 4\"X5\" PADS, Externally apply 0.25 each topically every other day Use as directed by wound center, Disp: 5 each, Rfl: 3  order for DME, 4x4 bulk SOFT gauze (disp 1 sleeve, refill x 5), Disp: 1 Units, Rfl: 0  Ascorbic Acid (VITAMIN C PO), Take by mouth daily, Disp: , Rfl:   vitamin E (TOCOPHEROL) 100 UNIT capsule, Take 100 Units by mouth daily Uncertain of dose, Disp: , Rfl:   miconazole (MICATIN) 2 % AERP, Apply 113 g topically 2 times daily, Disp: 1 each, Rfl: 3  order for DME, Equipment being ordered: Meplilex Transfer 6 X 8 inch ([id number]) - disp 3;  Aquacel AG 4 X 5 ([id number]); disp 3 - Refills 6, Disp: 1 each, Rfl: 6  order for DME, Equipment being ordered: Oxygen 2 LPM per nasal cannula during the day, portable also, Disp: 1 each, Rfl: 11  order for DME, Equipment being ordered: Wheelchair, Disp: 1 each, Rfl: 0  Elastic Bandages & Supports (T.E.D. BELOW KNEE/L-REGULAR) MISC, Apply in am and take off in the evening, Disp: 6 each, Rfl: 11  ketoconazole (NIZORAL) 2 % cream, Apply topically 2 times daily (Patient taking differently: Apply topically 2 times daily as needed ), Disp: 120 g, Rfl: 1  calcium carbonate (OS-MARILIN 500 MG [place]) 500 MG tablet, Take 1 tablet (500 mg) by mouth 2 times daily, Disp: 180 tablet, Rfl: 1  UNABLE " TO FIND, CPAP, Disp: , Rfl:   aspirin 81 MG tablet, Take 1 tablet by mouth daily, Disp: , Rfl:       Patient Active Problem List:     Pure hypercholesterolemia     Chronic kidney disease, stage III (moderate)     Morbid (severe) obesity due to excess calories (H)     Obstructive sleep apnea     ACP (advance care planning)     Skin ulcer (H)     Advanced directives, counseling/discussion     Controlled type 2 diabetes mellitus without complication, with long-term current use of insulin (H)     Type 2 diabetes mellitus with diabetic nephropathy, with long-term current use of insulin (H)     Essential hypertension with goal blood pressure less than 140/90     Convulsive syncope     Right hip pain     Vitamin B12 deficiency     Osteoarthrosis involving lower leg      Documentation of Face to Face and Certification for Home Health Services    I certify that patient, Trey Washington is under my care and that I, or a Nurse Practitioner or Physician's Assistant working with me, had a face-to-face encounter that meets the physician face-to-face encounter requirements with this patient on: 8/7/2017  .    This encounter with the patient was in whole, or in part, for the following medical condition, which is the primary reason for Home Health Care: right hip pain, polyarthralgia, morbid obesity and diabetes.    I certify that, based on my findings, the following services are medically necessary Home Health Services: Physical Therapy    My clinical findings support the need for the above services because: Physical Therapy Services are needed to assess and treat the following functional impairments: right hip pain and polyarthralgia.    Further, I certify that my clinical findings support that this patient is homebound (i.e. absences from home require considerable and taxing effort and are for medical reasons or [employer] services or infrequently or of short duration when for other reasons) because: Requires assistance of  another person or specialized equipment to access medical services because patient: Has prohibitive pain during ambulation..    Based on the above findings, I certify that this patient is confined to the home and needs intermittent skilled nursing care, physical therapy and/or speech therapy.  The patient is under my care, and I have initiated the establishment of the plan of care.  This patient will be followed by a physician who will periodically review the plan of care.    Physician/Provider to provide follow up care: Moreno Desai certified Physician at time of discharge: Moreno Desai      Please be aware that coverage of these services is subject to the terms and limitations of your health insurance plan.  Call member services at your health plan with any benefit or coverage questions.                  Who to contact     If you have questions or need follow up information about today's clinic visit or your schedule please contact [facility] directly at [phone].  Normal or non-critical lab and imaging results will be communicated to you by MyChart, letter or phone within 4 business days after the clinic has received the results. If you do not hear from us within 7 days, please contact the clinic through [facility] or phone. If you have a critical or abnormal lab result, we will notify you by phone as soon as possible.  Submit refill requests through [employer] or call your pharmacy and they will forward the refill request to us. Please allow 3 business days for your refill to be completed.          Additional Information About Your Visit        [facility] Information     [employer] gives you secure access to your electronic health record. If you see a primary care provider, you can also send messages to your care team and make appointments. If you have questions, please call your primary care clinic.  If you do not have a primary care provider, please call [phone] and  "they will assist you.        Care EveryWhere ID     This is your Care EveryWhere ID. This could be used by other organizations to access your [facility] medical records  [id number]        Your Vitals Were     Pulse Height Pulse Oximetry BMI (Body Mass Index)          84 5' 10\" (1.778 m) 89% 53.81 kg/m2         Blood Pressure from Last 3 Encounters:   08/07/17 138/84   07/31/17 (!) 182/67   04/03/17 132/86    Weight from Last 3 Encounters:   08/07/17 (!) 375 lb (170.1 kg)   07/28/17 (!) 375 lb (170.1 kg)   04/03/17 (!) 366 lb (166 kg)              We Performed the Following     HOME CARE NURSING REFERRAL          Today's Medication Changes          These changes are accurate as of: 8/7/17 12:59 PM.  If you have any questions, ask your nurse or doctor.               These medicines have changed or have updated prescriptions.        Dose/Directions    ketoconazole 2 % cream   Commonly known as:  NIZORAL   This may have changed:    - when to take this  - reasons to take this   Used for:  Tinea cruris        Apply topically 2 times daily   Quantity:  120 g   Refills:  1       traMADol 50 MG tablet   Commonly known as:  ULTRAM   This may have changed:  when to take this   Used for:  Right hip pain   Changed by:  Moreno Desai MD        Dose:  50 mg   Take 1 tablet (50 mg) by mouth every 8 hours as needed for moderate pain   Quantity:  90 tablet   Refills:  3            Where to get your medicines      Some of these will need a paper prescription and others can be bought over the counter.  Ask your nurse if you have questions.     Bring a paper prescription for each of these medications     traMADol 50 MG tablet                Primary Care Provider Office Phone # Fax #    Moreno Desai [phone]       [address] [address]        Equal Access to Services     WISAM SANTIAGO AH: Hadii sofy milian hadasho Soomaali, waaxda luqadaha, qaybta kaalmada adeegyada, waxay kattyin " "oseas martínez. So [facility] [phone].    ATENCIÓN: Si teola herson, tiene a cabrera disposición servicios gratuitos de asistencia lingüística. Dinora al [phone].    We comply with applicable federal civil rights laws and [place] laws. We do not discriminate on the basis of race, color, national origin, age, disability sex, sexual orientation or gender identity.            Thank you!     Thank you for choosing [facility]  for your care. Our goal is always to provide you with excellent care. Hearing back from our patients is one way we can continue to improve our services. Please take a few minutes to complete the written survey that you may receive in the mail after your visit with us. Thank you!             Your Updated Medication List - Protect others around you: Learn how to safely use, store and throw away your medicines at www.disposemymeds.org.          This list is accurate as of: 8/7/17 12:59 PM.  Always use your most recent med list.                   Brand Name Dispense Instructions for use Diagnosis    allopurinol 300 MG tablet    ZYLOPRIM    90 tablet    TAKE 1 TABLET BY MOUTH EVERY DAY    Idiopathic gout, unspecified chronicity, unspecified site       AQUACEL-AG EXTRA HYDROFIBER 4\"X5\" Pads     5 each    Externally apply 0.25 each topically every other day Use as directed by wound center    Open wound of abdomen       aspirin 81 MG tablet      Take 1 tablet by mouth daily        blood glucose lancing device     200 each    Use to test blood sugars 2 times daily or as directed.    IDDM (insulin dependent diabetes mellitus) (H)       blood glucose monitoring meter device kit     1 kit    Use to test blood sugars 2 times daily or as directed.    IDDM (insulin dependent diabetes mellitus) (H)       blood glucose monitoring test strip    KEO CONTOUR    200 each    Use to test blood sugars 2 times daily or as directed.    IDDM (insulin dependent diabetes mellitus) (H)       " calcium carbonate 1250 MG tablet    OS-MARILIN 500 mg [place]    180 tablet    Take 1 tablet (500 mg) by mouth 2 times daily    HTN (hypertension)       captopril 50 MG tablet    CAPOTEN    270 tablet    Take 2 tablets (100 mg) by mouth 3 times daily    Essential hypertension       * cyanocolbalamin 500 MCG tablet    vitamin  B-12    180 tablet    TAKE 2 TABLETS (1,000 MCG) BY MOUTH DAILY    Vitamin deficiency       * cyanocolbalamin 500 MCG tablet    vitamin  B-12    180 tablet    TAKE 2 TABLETS (1,000 MCG) BY MOUTH DAILY    Vitamin deficiency       DOCQLACE 100 MG capsule   Generic drug:  docusate sodium     100 capsule    TAKE 1 CAPSULE BY MOUTH TWICE DAILY    Constipation       fish oil-omega-3 fatty acids 1000 MG capsule      Take 1,000 mg by mouth daily        ketoconazole 2 % cream    NIZORAL    120 g    Apply topically 2 times daily    Tinea cruris       LANTUS SOLOSTAR 100 UNIT/ML injection   Generic drug:  insulin glargine     15 mL    INJECT 18 UNITS SUBCUTANEOUSLY EVERY DAY AT BEDTIME    Type 2 diabetes mellitus without complication (H)       levETIRAcetam 1000 MG Tabs     60 tablet    TAKE 1 TABLET BY MOUTH TWICE DAILY    Seizure disorder (H)       metFORMIN 1000 MG tablet    GLUCOPHAGE    90 tablet    TAKE ONE TABLET BY MOUTH EVERY DAY WITH A MEAL    Diabetes mellitus, type 2 (H)       METOPROLOL TARTRATE PO      Take 25 mg by mouth 2 times daily        miconazole 2 % Aerp powder    MICATIN    1 each    Apply 113 g topically 2 times daily    Fungal infection       NOVOFINE 30G X 8 MM   Generic drug:  insulin pen needle     100 each    USE 4 PENS NEEDLES DAILY OR AS DIRECTED    DM ketoacidosis type I (H)       NovoLOG FLEXPEN 100 UNIT/ML injection   Generic drug:  insulin aspart     9 mL    INJECT 8 UNITS SUBCUTANEOUS BEFORE BEDTIME IN ADDITION TO SLIDING SCALE    Type 1 diabetes, HbA1c goal < 7% (H)       * order for DME     1 each    Equipment being ordered: Oxygen 2 LPM per nasal cannula during the day,  portable also    Hypoxia       * order for DME     1 each    Equipment being ordered: Wheelchair    Morbid obesity, unspecified obesity type (H)       * order for DME     1 each    Equipment being ordered: Meplilex Transfer 6 X 8 inch ([id number]) - disp 3;  Aquacel AG 4 X 5 ([id number]); disp 3 - Refills 6    Skin ulcer, limited to breakdown of skin (H)       order for DME     1 Units    4x4 bulk SOFT gauze (disp 1 sleeve, refill x 5)    Ulcer of skin (H)       polyethylene glycol powder    MIRALAX/GLYCOLAX    527 g    TAKE 17 GRAMS BY MOUTH DAILY MIXED AS DIRECTED.    Constipation       sertraline 100 MG tablet    ZOLOFT    90 tablet    TAKE 1 TABLET BY MOUTH ONCE DAILY    Dysthymia       simvastatin 10 MG tablet    ZOCOR    90 tablet    TAKE 1 TABLET (10 MG) BY MOUTH AT BEDTIME    Hypercholesterolemia       T.E.D. BELOW KNEE/L-REGULAR Misc     6 each    Apply in am and take off in the evening    Swelling of limb       terazosin 10 MG capsule    HYTRIN    90 capsule    TAKE 1 CAPSULE BY MOUTH NIGHTLY AT BEDTIME    Essential hypertension       traMADol 50 MG tablet    ULTRAM    90 tablet    Take 1 tablet (50 mg) by mouth every 8 hours as needed for moderate pain    Right hip pain       TYLENOL PO      Take 500 mg by mouth Takes 2 tablets at HS        UNABLE TO FIND      CPAP        UNISTIK 3 NORMAL Misc     100 each    USE TO TEST BLOOD SUGARS TWO TIMES A DAY    Diabetes mellitus, type 2 (H)       VITAMIN C PO      Take by mouth daily        VITAMIN D HIGH POTENCY 1000 UNITS Caps     90 capsule    TAKE 1 CAPSULE BY MOUTH DAILY    Vitamin D deficiency       vitamin E 100 UNIT capsule    TOCOPHEROL     Take 100 Units by mouth daily Uncertain of dose        * Notice:  This list has 5 medication(s) that are the same as other medications prescribed for you. Read the directions carefully, and ask your doctor or other care provider to review them with you.

## 2017-08-07 NOTE — PROGRESS NOTES
Trey Washington    August 7, 2017    Chief Complaint   Patient presents with     Hospital F/U     ED 7/28/2017 Right Hip pain       SUBJECTIVE:  Here for f/u.  Having severe ongoing right hip and other joint pains.  Ultram helps a lot.  No side effects.  Talked a long time about this.  Also, talked about home cares and homebound status.  Getting into the vehicle requires a great deal of effort and Maria Eugenia to help him.  Would like to get some home PT if appropriate.  His back, hips, knees, and ankles all hurt as do his hands.  The ultram helps though.      Past Medical History:   Diagnosis Date     Chronic kidney disease, stage III (moderate)      Obesity, unspecified      Obstructive sleep apnea (adult) (pediatric)      Osteoarthrosis, unspecified whether generalized or localized, lower leg      Other abnormal blood chemistry     hyperuricemia     Other B-complex deficiencies      Other choreas     Hemiballism     Pure hypercholesterolemia      Skin ulcer (H)      Type II or unspecified type diabetes mellitus without mention of complication, not stated as uncontrolled      Unspecified essential hypertension        Past Surgical History:   Procedure Laterality Date     COLONOSCOPY       ENT SURGERY      lanced abcess in left ear     GENITOURINARY SURGERY      removal of kidney stones     ORTHOPEDIC SURGERY      knee arthroscopy     ORTHOPEDIC SURGERY  2003    bilateral knee replacement     ORTHOPEDIC SURGERY      right knee arthroscopy     PHACOEMULSIFICATION WITH STANDARD INTRAOCULAR LENS IMPLANT  2/11/2014    Procedure: PHACOEMULSIFICATION WITH STANDARD INTRAOCULAR LENS IMPLANT;  CATARACT EXTRACTION WITH INTRA OCULAR LENS RIGHT;  Surgeon: Luis James MD;  Location: [facility] OR     PHACOEMULSIFICATION WITH STANDARD INTRAOCULAR LENS IMPLANT  3/11/2014    Procedure: PHACOEMULSIFICATION WITH STANDARD INTRAOCULAR LENS IMPLANT;  CATARACT EXTRACTION WITH INTRA OCULAR LENS LEFT  ;  Surgeon: Luis James MD;  Location: [facility]  OR     TONSILLECTOMY & ADENOIDECTOMY  1942       Current Outpatient Prescriptions   Medication Sig Dispense Refill     traMADol (ULTRAM) 50 MG tablet Take 1 tablet (50 mg) by mouth every 8 hours as needed for moderate pain 90 tablet 3     METOPROLOL TARTRATE PO Take 25 mg by mouth 2 times daily       NOVOLOG FLEXPEN 100 UNIT/ML soln INJECT 8 UNITS SUBCUTANEOUS BEFORE BEDTIME IN ADDITION TO SLIDING SCALE 9 mL 0     DOCQLACE 100 MG capsule TAKE 1 CAPSULE BY MOUTH TWICE DAILY 100 capsule 1     cyanocolbalamin (VITAMIN  B-12) 500 MCG tablet TAKE 2 TABLETS (1,000 MCG) BY MOUTH DAILY 180 tablet 1     NOVOFINE 30G X 8 MM insulin pen needle USE 4 PENS NEEDLES DAILY OR AS DIRECTED 100 each 6     LANTUS SOLOSTAR 100 UNIT/ML soln INJECT 18 UNITS SUBCUTANEOUSLY EVERY DAY AT BEDTIME 15 mL 0     levETIRAcetam 1000 MG TABS TAKE 1 TABLET BY MOUTH TWICE DAILY 60 tablet 0     cyanocolbalamin (VITAMIN  B-12) 500 MCG tablet TAKE 2 TABLETS (1,000 MCG) BY MOUTH DAILY 180 tablet 0     allopurinol (ZYLOPRIM) 300 MG tablet TAKE 1 TABLET BY MOUTH EVERY DAY 90 tablet 0     metFORMIN (GLUCOPHAGE) 1000 MG tablet TAKE ONE TABLET BY MOUTH EVERY DAY WITH A MEAL 90 tablet 0     Lancets Misc. (UNISTIK 3 NORMAL) MISC USE TO TEST BLOOD SUGARS TWO TIMES A  each 2     terazosin (HYTRIN) 10 MG capsule TAKE 1 CAPSULE BY MOUTH NIGHTLY AT BEDTIME 90 capsule 0     captopril (CAPOTEN) 50 MG tablet Take 2 tablets (100 mg) by mouth 3 times daily 270 tablet 3     fish oil-omega-3 fatty acids 1000 MG capsule Take 1,000 mg by mouth daily       simvastatin (ZOCOR) 10 MG tablet TAKE 1 TABLET (10 MG) BY MOUTH AT BEDTIME 90 tablet 1     sertraline (ZOLOFT) 100 MG tablet TAKE 1 TABLET BY MOUTH ONCE DAILY 90 tablet 3     polyethylene glycol (MIRALAX/GLYCOLAX) powder TAKE 17 GRAMS BY MOUTH DAILY MIXED AS DIRECTED. 527 g 6     Cholecalciferol (VITAMIN D HIGH POTENCY) 1000 UNITS CAPS TAKE 1 CAPSULE BY MOUTH DAILY 90 capsule 3     Acetaminophen (TYLENOL PO) Take 500  "mg by mouth Takes 2 tablets at HS       blood glucose monitoring (KEO CONTOUR MONITOR) meter device kit Use to test blood sugars 2 times daily or as directed. 1 kit 0     blood glucose monitoring (KEO CONTOUR) test strip Use to test blood sugars 2 times daily or as directed. 200 each 3     blood glucose (KEO MICROLET 2) lancing device Use to test blood sugars 2 times daily or as directed. 200 each 3     Silver-Carboxymethylcellulose (AQUACEL-AG EXTRA HYDROFIBER) 4\"X5\" PADS Externally apply 0.25 each topically every other day Use as directed by wound center 5 each 3     order for DME 4x4 bulk SOFT gauze (disp 1 sleeve, refill x 5) 1 Units 0     Ascorbic Acid (VITAMIN C PO) Take by mouth daily       vitamin E (TOCOPHEROL) 100 UNIT capsule Take 100 Units by mouth daily Uncertain of dose       miconazole (MICATIN) 2 % AERP Apply 113 g topically 2 times daily 1 each 3     order for DME Equipment being ordered: Meplilex Transfer 6 X 8 inch (693588) - disp 3;  Aquacel AG 4 X 5 ([id number]); disp 3 - Refills 6 1 each 6     order for DME Equipment being ordered: Oxygen 2 LPM per nasal cannula during the day, portable also 1 each 11     order for DME Equipment being ordered: Wheelchair 1 each 0     Elastic Bandages & Supports (T.E.D. BELOW KNEE/L-REGULAR) MISC Apply in am and take off in the evening 6 each 11     ketoconazole (NIZORAL) 2 % cream Apply topically 2 times daily (Patient taking differently: Apply topically 2 times daily as needed ) 120 g 1     calcium carbonate (OS-MARILIN 500 MG [place]. [place]) 500 MG tablet Take 1 tablet (500 mg) by mouth 2 times daily 180 tablet 1     UNABLE TO FIND CPAP       aspirin 81 MG tablet Take 1 tablet by mouth daily         Allergies   Allergen Reactions     Hydrocodone      Intolerance       Penicillins Swelling       Family History   Problem Relation Age of Onset     C.A.D. Father      MI     Other - See Comments Father      shell shocked in the war     DIABETES Paternal Grandmother  "       Social History     Social History     Marital status:      Spouse name: N/A     Number of children: N/A     Years of education: N/A     Occupational History           retired     Social History Main Topics     Smoking status: Former Smoker     Years: 10.00     Types: Cigars     Quit date: 9/29/1987     Smokeless tobacco: Never Used     Alcohol use No     Drug use: No     Sexual activity: No      Comment:      Other Topics Concern     Parent/Sibling W/ Cabg, Mi Or Angioplasty Before 65f 55m? No      Service Yes     [employer]     Blood Transfusions Yes     Permits if needed     Seat Belt Yes     Social History Narrative       5 point ROS negative except as noted above in HPI, including Gen., Resp., CV, GI &  system review.     OBJECTIVE:  B/P: 138/84, T: Data Unavailable, P: 84, R: Data Unavailable    GENERAL APPEARANCE: Alert, no acute distress  CV: regular rate and rhythm, no murmur, rub or gallop  RESP: lungs clear to auscultation bilaterally  ABDOMEN: normal bowel sounds, soft, nontender, no hepatosplenomegaly or other masses  SKIN: no suspicious lesions or rashes to visualized skin  NEURO: Alert, oriented x 3, speech and mentation normal    ASSESSMENT and PLAN:  (M25.50) Pain in joint, multiple sites  (primary encounter diagnosis)  Comment: with severe immobility.   Plan: HOME CARE NURSING REFERRAL        Set up for home care.  It is clear he can benefit from this and is homebound.      (M25.551) Right hip pain  Comment: improved, ongoing  Plan: traMADol (ULTRAM) 50 MG tablet, HOME CARE         NURSING REFERRAL        Ultram up to tid.  Try to minimize.  So far no side effects at all.  Reviewed options and with hx of GIB, going to go with this for now.  Caregiver understands as well.     (E66.01) Morbid (severe) obesity due to excess calories (H)  Comment: as above.   Plan: HOME CARE NURSING REFERRAL        As above.     (E11.9,  Z79.4) Controlled type 2 diabetes  mellitus without complication, with long-term current use of insulin (H)  Comment: ongoing, stable.   Plan: no change.

## 2017-08-08 ASSESSMENT — ANXIETY QUESTIONNAIRES: GAD7 TOTAL SCORE: 0

## 2017-08-10 RX ORDER — CHOLECALCIFEROL (VITAMIN D3) 25 MCG
CAPSULE ORAL
Qty: 90 CAPSULE | Refills: 3 | Status: SHIPPED | OUTPATIENT
Start: 2017-08-10 | End: 2018-01-01

## 2017-08-10 NOTE — TELEPHONE ENCOUNTER
Vitamin D       Last Written Prescription Date:  10/28/16  Last Fill Quantity: 90,   # refills: 0  Last Office Visit with [facility], [facility] or  Health prescribing provider: 8/7/17  Future Office visit:       Routing refill request to provider for review/approval because:  Drug not on the [facility], [facility] or  [facility] refill protocol or controlled substance  \

## 2017-08-14 ENCOUNTER — MEDICAL CORRESPONDENCE (OUTPATIENT)
Dept: HEALTH INFORMATION MANAGEMENT | Facility: HOSPITAL | Age: 82
End: 2017-08-14

## 2017-08-21 DIAGNOSIS — G40.909 SEIZURE DISORDER (H): ICD-10-CM

## 2017-08-21 DIAGNOSIS — M10.00 IDIOPATHIC GOUT, UNSPECIFIED CHRONICITY, UNSPECIFIED SITE: ICD-10-CM

## 2017-08-21 DIAGNOSIS — E11.9 DIABETES MELLITUS, TYPE 2 (H): ICD-10-CM

## 2017-08-21 NOTE — LETTER
[facility] [place]  [address]  [place] [address]  Phone: [phone]    August 22, 2017       Trey Washington  [address] [place] [id number]            Dear Trey:    We are concerned about your health care.  We recently provided you with medication refills.  Lab tests (Uric Acid) are required every 12 months in order to continue refilling your Allopurinol.  Orders have been placed in our computer and should be accessible at most [facility] labs. Please complete this lab work as soon as possible.        Thank You,      Moreno Desai MD

## 2017-08-21 NOTE — TELEPHONE ENCOUNTER
Levetiracetam      Last Written Prescription Date: 6/29/17  Last Fill Quantity: 60,  # refills: 0   Last Office Visit with [facility], [facility] or [facility] prescribing provider: 8/7/17    Metformin         Last Written Prescription Date: 6/8/17  Last Fill Quantity: 90, # refills: 0  Last Office Visit with [facility], [facility] or  Health prescribing provider:  8/7/17        BP Readings from Last 3 Encounters:   08/07/17 138/84   07/31/17 (!) 182/67   04/03/17 132/86     Lab Results   Component Value Date    MICROL 469 04/03/2017     Lab Results   Component Value Date    UMALCR 720.43 04/03/2017     Creatinine   Date Value Ref Range Status   07/31/2017 1.55 (H) 0.66 - 1.25 mg/dL Final   ]  GFR Estimate   Date Value Ref Range Status   07/31/2017 43 (L) >60 mL/min/1.7m2 Final     Comment:     Non  GFR Calc   07/30/2017 41 (L) >60 mL/min/1.7m2 Final     Comment:     Non  GFR Calc   07/29/2017 42 (L) >60 mL/min/1.7m2 Final     Comment:     Non  GFR Calc     GFR Estimate If Black   Date Value Ref Range Status   07/31/2017 52 (L) >60 mL/min/1.7m2 Final     Comment:      GFR Calc   07/30/2017 49 (L) >60 mL/min/1.7m2 Final     Comment:      GFR Calc   07/29/2017 51 (L) >60 mL/min/1.7m2 Final     Comment:      GFR Calc     Lab Results   Component Value Date    CHOL 182 04/03/2017     Lab Results   Component Value Date    HDL 48 04/03/2017     Lab Results   Component Value Date     04/03/2017     Lab Results   Component Value Date    TRIG 155 04/03/2017     Lab Results   Component Value Date    CHOLHDLRATIO 3.7 05/06/2015     Lab Results   Component Value Date    AST 14 03/11/2017     Lab Results   Component Value Date    ALT 12 03/11/2017     Lab Results   Component Value Date    A1C 5.1 04/03/2017    A1C 6.1 10/05/2016    A1C 6.3 04/18/2016    A1C 6.2 12/29/2015    A1C 7.6 10/13/2015     Potassium   Date Value Ref Range Status   07/31/2017 4.7 3.4  - 5.3 mmol/L Final     Allopurinol       Last Written Prescription Date: 6/8/17  Last Fill Quantity: 90, # refills: 0  Last Office Visit with [facility], [facility] or [facility] prescribing provider:  8/7/17        Uric Acid   Date Value Ref Range Status   09/15/2015 5.6 3.5 - 7.2 mg/dL Final   ]  Creatinine   Date Value Ref Range Status   07/31/2017 1.55 (H) 0.66 - 1.25 mg/dL Final   ]  Lab Results   Component Value Date    WBC 7.7 07/28/2017     Lab Results   Component Value Date    RBC 4.15 07/28/2017     Lab Results   Component Value Date    HGB 13.0 07/28/2017     Lab Results   Component Value Date    HCT 39.4 07/28/2017     No components found for: MCT  Lab Results   Component Value Date    MCV 95 07/28/2017     Lab Results   Component Value Date    MCH 31.3 07/28/2017     Lab Results   Component Value Date    MCHC 33.0 07/28/2017     Lab Results   Component Value Date    RDW 14.9 07/28/2017     Lab Results   Component Value Date     07/28/2017     Lab Results   Component Value Date    AST 14 03/11/2017     Lab Results   Component Value Date    ALT 12 03/11/2017

## 2017-08-22 RX ORDER — LEVETIRACETAM 1000 MG/1
TABLET ORAL
Qty: 60 TABLET | Refills: 5 | Status: SHIPPED | OUTPATIENT
Start: 2017-08-22 | End: 2018-03-19

## 2017-08-22 RX ORDER — ALLOPURINOL 300 MG/1
TABLET ORAL
Qty: 30 TABLET | Refills: 0 | Status: SHIPPED | OUTPATIENT
Start: 2017-08-22 | End: 2017-08-29

## 2017-08-28 DIAGNOSIS — K59.00 CONSTIPATION: ICD-10-CM

## 2017-08-29 ENCOUNTER — TELEPHONE (OUTPATIENT)
Dept: FAMILY MEDICINE | Facility: OTHER | Age: 82
End: 2017-08-29

## 2017-08-29 DIAGNOSIS — M10.00 IDIOPATHIC GOUT, UNSPECIFIED CHRONICITY, UNSPECIFIED SITE: ICD-10-CM

## 2017-08-29 RX ORDER — ALLOPURINOL 300 MG/1
1 TABLET ORAL DAILY
Qty: 90 TABLET | Refills: 3 | Status: SHIPPED | OUTPATIENT
Start: 2017-08-29 | End: 2018-01-01

## 2017-08-29 RX ORDER — POLYETHYLENE GLYCOL 3350 17 G/17G
POWDER, FOR SOLUTION ORAL
Qty: 527 G | Refills: 8 | Status: SHIPPED | OUTPATIENT
Start: 2017-08-29 | End: 2018-01-01

## 2017-08-29 NOTE — TELEPHONE ENCOUNTER
Adri Yang got a letter a Uric acid level is needed. He was just in recently. Can this wait until his appt in November?

## 2017-09-19 DIAGNOSIS — E78.00 HYPERCHOLESTEROLEMIA: ICD-10-CM

## 2017-09-19 NOTE — TELEPHONE ENCOUNTER
zocor     Last Written Prescription Date: 2/28/2017  Last Fill Quantity: 90, # refills: 0  Last Office Visit with [facility], [facility] or [facility] prescribing provider: 8/07/2017       Lab Results   Component Value Date    CHOL 182 04/03/2017     Lab Results   Component Value Date    HDL 48 04/03/2017     Lab Results   Component Value Date     04/03/2017     Lab Results   Component Value Date    TRIG 155 04/03/2017     Lab Results   Component Value Date    CHOLHDLRATIO 3.7 05/06/2015

## 2017-09-20 RX ORDER — SIMVASTATIN 10 MG
TABLET ORAL
Qty: 90 TABLET | Refills: 1 | Status: SHIPPED | OUTPATIENT
Start: 2017-09-20 | End: 2017-12-12

## 2017-10-17 DIAGNOSIS — E11.9 DIABETES MELLITUS, TYPE 2 (H): ICD-10-CM

## 2017-10-17 DIAGNOSIS — E11.9 TYPE 2 DIABETES MELLITUS WITHOUT COMPLICATION (H): ICD-10-CM

## 2017-10-18 ENCOUNTER — OFFICE VISIT (OUTPATIENT)
Dept: FAMILY MEDICINE | Facility: OTHER | Age: 82
End: 2017-10-18
Attending: FAMILY MEDICINE
Payer: MEDICARE

## 2017-10-18 VITALS
SYSTOLIC BLOOD PRESSURE: 136 MMHG | DIASTOLIC BLOOD PRESSURE: 88 MMHG | BODY MASS INDEX: 45.1 KG/M2 | TEMPERATURE: 96.1 F | WEIGHT: 315 LBS | HEIGHT: 70 IN | HEART RATE: 67 BPM | OXYGEN SATURATION: 92 %

## 2017-10-18 DIAGNOSIS — I10 ESSENTIAL HYPERTENSION WITH GOAL BLOOD PRESSURE LESS THAN 140/90: ICD-10-CM

## 2017-10-18 DIAGNOSIS — E66.01 MORBID (SEVERE) OBESITY DUE TO EXCESS CALORIES (H): ICD-10-CM

## 2017-10-18 DIAGNOSIS — E11.9 CONTROLLED TYPE 2 DIABETES MELLITUS WITHOUT COMPLICATION, WITH LONG-TERM CURRENT USE OF INSULIN (H): Primary | ICD-10-CM

## 2017-10-18 DIAGNOSIS — M10.00 IDIOPATHIC GOUT, UNSPECIFIED CHRONICITY, UNSPECIFIED SITE: ICD-10-CM

## 2017-10-18 DIAGNOSIS — G47.33 OBSTRUCTIVE SLEEP APNEA: ICD-10-CM

## 2017-10-18 DIAGNOSIS — G89.29 OTHER CHRONIC PAIN: ICD-10-CM

## 2017-10-18 DIAGNOSIS — Z79.4 CONTROLLED TYPE 2 DIABETES MELLITUS WITHOUT COMPLICATION, WITH LONG-TERM CURRENT USE OF INSULIN (H): Primary | ICD-10-CM

## 2017-10-18 DIAGNOSIS — Z23 NEED FOR PROPHYLACTIC VACCINATION AND INOCULATION AGAINST INFLUENZA: ICD-10-CM

## 2017-10-18 LAB
ANION GAP SERPL CALCULATED.3IONS-SCNC: 8 MMOL/L (ref 3–14)
BUN SERPL-MCNC: 41 MG/DL (ref 7–30)
CALCIUM SERPL-MCNC: 9.5 MG/DL (ref 8.5–10.1)
CHLORIDE SERPL-SCNC: 104 MMOL/L (ref 94–109)
CO2 SERPL-SCNC: 28 MMOL/L (ref 20–32)
CREAT SERPL-MCNC: 1.77 MG/DL (ref 0.66–1.25)
EST. AVERAGE GLUCOSE BLD GHB EST-MCNC: 114 MG/DL
GFR SERPL CREATININE-BSD FRML MDRD: 37 ML/MIN/1.7M2
GLUCOSE SERPL-MCNC: 113 MG/DL (ref 70–99)
HBA1C MFR BLD: 5.6 % (ref 4.3–6)
POTASSIUM SERPL-SCNC: 4.1 MMOL/L (ref 3.4–5.3)
SODIUM SERPL-SCNC: 140 MMOL/L (ref 133–144)
URATE SERPL-MCNC: 5.2 MG/DL (ref 3.5–7.2)

## 2017-10-18 PROCEDURE — 99212 OFFICE O/P EST SF 10 MIN: CPT

## 2017-10-18 PROCEDURE — 90471 IMMUNIZATION ADMIN: CPT | Performed by: FAMILY MEDICINE

## 2017-10-18 PROCEDURE — 40000788 ZZHCL STATISTIC ESTIMATED AVERAGE GLUCOSE: Mod: ZL | Performed by: FAMILY MEDICINE

## 2017-10-18 PROCEDURE — 80307 DRUG TEST PRSMV CHEM ANLYZR: CPT | Mod: ZL | Performed by: FAMILY MEDICINE

## 2017-10-18 PROCEDURE — 36415 COLL VENOUS BLD VENIPUNCTURE: CPT | Mod: ZL | Performed by: FAMILY MEDICINE

## 2017-10-18 PROCEDURE — 83036 HEMOGLOBIN GLYCOSYLATED A1C: CPT | Mod: ZL | Performed by: FAMILY MEDICINE

## 2017-10-18 PROCEDURE — 80048 BASIC METABOLIC PNL TOTAL CA: CPT | Mod: ZL | Performed by: FAMILY MEDICINE

## 2017-10-18 PROCEDURE — 84550 ASSAY OF BLOOD/URIC ACID: CPT | Mod: ZL | Performed by: FAMILY MEDICINE

## 2017-10-18 PROCEDURE — 99000 SPECIMEN HANDLING OFFICE-LAB: CPT | Mod: ZL | Performed by: FAMILY MEDICINE

## 2017-10-18 PROCEDURE — 99214 OFFICE O/P EST MOD 30 MIN: CPT | Performed by: FAMILY MEDICINE

## 2017-10-18 PROCEDURE — 90662 IIV NO PRSV INCREASED AG IM: CPT | Performed by: FAMILY MEDICINE

## 2017-10-18 ASSESSMENT — ANXIETY QUESTIONNAIRES
5. BEING SO RESTLESS THAT IT IS HARD TO SIT STILL: NOT AT ALL
6. BECOMING EASILY ANNOYED OR IRRITABLE: NOT AT ALL
1. FEELING NERVOUS, ANXIOUS, OR ON EDGE: NOT AT ALL
3. WORRYING TOO MUCH ABOUT DIFFERENT THINGS: NOT AT ALL
2. NOT BEING ABLE TO STOP OR CONTROL WORRYING: NOT AT ALL
4. TROUBLE RELAXING: NOT AT ALL
7. FEELING AFRAID AS IF SOMETHING AWFUL MIGHT HAPPEN: NOT AT ALL
GAD7 TOTAL SCORE: 0

## 2017-10-18 ASSESSMENT — PATIENT HEALTH QUESTIONNAIRE - PHQ9: SUM OF ALL RESPONSES TO PHQ QUESTIONS 1-9: 0

## 2017-10-18 ASSESSMENT — PAIN SCALES - GENERAL: PAINLEVEL: NO PAIN (0)

## 2017-10-18 NOTE — NURSING NOTE
"Chief Complaint   Patient presents with     Diabetes       Initial /88  Pulse 67  Temp 96.1  F (35.6  C)  Ht 5' 10\" (1.778 m)  Wt (!) 355 lb (161 kg)  SpO2 92%  BMI 50.94 kg/m2 Estimated body mass index is 50.94 kg/(m^2) as calculated from the following:    Height as of this encounter: 5' 10\" (1.778 m).    Weight as of this encounter: 355 lb (161 kg).  Medication Reconciliation: complete   Tonya Cartwright    "

## 2017-10-18 NOTE — MR AVS SNAPSHOT
After Visit Summary   10/18/2017    Trey Washington    MRN: [id number]           Patient Information     Date Of Birth          1935        Visit Information        Provider Department      10/18/2017 9:15 AM Moreno Desai MD [facility]        Today's Diagnoses     Controlled type 2 diabetes mellitus without complication, with long-term current use of insulin (H)    -  1    Need for prophylactic vaccination and inoculation against influenza        Essential hypertension with goal blood pressure less than 140/90        Morbid (severe) obesity due to excess calories (H)        Obstructive sleep apnea        Idiopathic gout, unspecified chronicity, unspecified site        Other chronic pain          Care Instructions    F/u with ongoing concerns.           Follow-ups after your visit        Who to contact     If you have questions or need follow up information about today's clinic visit or your schedule please contact [facility] directly at [phone].  Normal or non-critical lab and imaging results will be communicated to you by [employer], letter or phone within 4 business days after the clinic has received the results. If you do not hear from us within 7 days, please contact the clinic through [employer] or phone. If you have a critical or abnormal lab result, we will notify you by phone as soon as possible.  Submit refill requests through [employer] or call your pharmacy and they will forward the refill request to us. Please allow 3 business days for your refill to be completed.          Additional Information About Your Visit        MyChart Information     [employer] gives you secure access to your electronic health record. If you see a primary care provider, you can also send messages to your care team and make appointments. If you have questions, please call your primary care clinic.  If you do not have a primary care provider, please call [phone] and they will  "assist you.        Care EveryWhere ID     This is your Care EveryWhere ID. This could be used by other organizations to access your [facility] medical records  [id number]        Your Vitals Were     Pulse Temperature Height Pulse Oximetry BMI (Body Mass Index)       67 96.1  F (35.6  C) 5' 10\" (1.778 m) 92% 50.94 kg/m2        Blood Pressure from Last 3 Encounters:   10/18/17 136/88   08/07/17 138/84   07/31/17 (!) 182/67    Weight from Last 3 Encounters:   10/18/17 (!) 355 lb (161 kg)   08/07/17 (!) 375 lb (170.1 kg)   07/28/17 (!) 375 lb (170.1 kg)              We Performed the Following     Basic metabolic panel     FLU VACCINE, INCREASED ANTIGEN, PRESV FREE, AGE 65+ [57501]     Foot Exam - HIM Scan     Hemoglobin A1c     Pain Drug Scr UR W Rptd Meds     Uric acid     Vaccine Administration, Initial [37813]          Today's Medication Changes          These changes are accurate as of: 10/18/17  9:56 AM.  If you have any questions, ask your nurse or doctor.               These medicines have changed or have updated prescriptions.        Dose/Directions    cyanocolbalamin 500 MCG tablet   Commonly known as:  vitamin  B-12   This may have changed:  Another medication with the same name was removed. Continue taking this medication, and follow the directions you see here.   Used for:  Vitamin deficiency   Changed by:  Moreno Desai MD        TAKE 2 TABLETS (1,000 MCG) BY MOUTH DAILY   Quantity:  180 tablet   Refills:  1       ketoconazole 2 % cream   Commonly known as:  NIZORAL   This may have changed:    - when to take this  - reasons to take this   Used for:  Tinea cruris        Apply topically 2 times daily   Quantity:  120 g   Refills:  1                Primary Care Provider Office Phone # Fax #    Moreno Desai [phone]       [address] [address]        Equal Access to Services     WISAM SANTIAGO AH: Lashae Joyner, cydney nieto, " "de armando. So [facility] [phone].    ATENCIÓN: Si bailey bains, tiene a cabrera disposición servicios gratuitos de asistencia lingüística. Dinora al [phone].    We comply with applicable federal civil rights laws and [place] laws. We do not discriminate on the basis of race, color, national origin, age, disability, sex, sexual orientation, or gender identity.            Thank you!     Thank you for choosing [facility]  for your care. Our goal is always to provide you with excellent care. Hearing back from our patients is one way we can continue to improve our services. Please take a few minutes to complete the written survey that you may receive in the mail after your visit with us. Thank you!             Your Updated Medication List - Protect others around you: Learn how to safely use, store and throw away your medicines at www.disposemymeds.org.          This list is accurate as of: 10/18/17  9:56 AM.  Always use your most recent med list.                   Brand Name Dispense Instructions for use Diagnosis    allopurinol 300 MG tablet    ZYLOPRIM    90 tablet    Take 1 tablet (300 mg) by mouth daily    Idiopathic gout, unspecified chronicity, unspecified site       AQUACEL-AG EXTRA HYDROFIBER 4\"X5\" Pads     5 each    Externally apply 0.25 each topically every other day Use as directed by wound center    Open wound of abdomen       aspirin 81 MG tablet      Take 1 tablet by mouth daily        blood glucose lancing device     200 each    Use to test blood sugars 2 times daily or as directed.    IDDM (insulin dependent diabetes mellitus) (H)       blood glucose monitoring meter device kit     1 kit    Use to test blood sugars 2 times daily or as directed.    IDDM (insulin dependent diabetes mellitus) (H)       blood glucose monitoring test strip    KEO CONTOUR    200 each    Use to test blood sugars 2 times daily or as directed.    IDDM " (insulin dependent diabetes mellitus) (H)       calcium carbonate 1250 MG tablet    OS-MARILIN 500 mg [place]. [place]    180 tablet    Take 1 tablet (500 mg) by mouth 2 times daily    HTN (hypertension)       captopril 50 MG tablet    CAPOTEN    270 tablet    Take 2 tablets (100 mg) by mouth 3 times daily    Essential hypertension       cyanocolbalamin 500 MCG tablet    vitamin  B-12    180 tablet    TAKE 2 TABLETS (1,000 MCG) BY MOUTH DAILY    Vitamin deficiency       DOCQLACE 100 MG capsule   Generic drug:  docusate sodium     100 capsule    TAKE 1 CAPSULE BY MOUTH TWICE DAILY    Constipation       fish oil-omega-3 fatty acids 1000 MG capsule      Take 1,000 mg by mouth daily        ketoconazole 2 % cream    NIZORAL    120 g    Apply topically 2 times daily    Tinea cruris       LANTUS SOLOSTAR 100 UNIT/ML injection   Generic drug:  insulin glargine     15 mL    INJECT 18 UNITS SUBCUTANEOUSLY EVERY DAY AT BEDTIME    Type 2 diabetes mellitus without complication (H)       levETIRAcetam 1000 MG Tabs     60 tablet    TAKE 1 TABLET BY MOUTH TWICE DAILY    Seizure disorder (H)       metFORMIN 1000 MG tablet    GLUCOPHAGE    90 tablet    TAKE 1 TABLET BY MOUTH EVERY DAY WITH A MEAL    Diabetes mellitus, type 2 (H)       METOPROLOL TARTRATE PO      Take 25 mg by mouth 2 times daily        miconazole 2 % Aerp powder    MICATIN    1 each    Apply 113 g topically 2 times daily    Fungal infection       NOVOFINE 30G X 8 MM   Generic drug:  insulin pen needle     100 each    USE 4 PENS NEEDLES DAILY OR AS DIRECTED    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, not stated as uncontrolled(250.11)       NovoLOG FLEXPEN 100 UNIT/ML injection   Generic drug:  insulin aspart     9 mL    INJECT 8 UNITS SUBCUTANEOUS BEFORE BEDTIME IN ADDITION TO SLIDING SCALE    Type 1 diabetes, HbA1c goal < 7% (H)       * order for DME     1 each    Equipment being ordered: Oxygen 2 LPM per nasal cannula during the day, portable also    Hypoxia        * order for DME     1 each    Equipment being ordered: Wheelchair    Morbid obesity, unspecified obesity type (H)       * order for DME     1 each    Equipment being ordered: Meplilex Transfer 6 X 8 inch ([id number]) - disp 3;  Aquacel AG 4 X 5 ([id number]); disp 3 - Refills 6    Skin ulcer, limited to breakdown of skin (H)       order for DME     1 Units    4x4 bulk SOFT gauze (disp 1 sleeve, refill x 5)    Ulcer of skin (H)       polyethylene glycol powder    MIRALAX/GLYCOLAX    527 g    TAKE 17 GRAMS BY MOUTH DAILY MIXED AS DIRECTED.    Constipation       sertraline 100 MG tablet    ZOLOFT    90 tablet    TAKE 1 TABLET BY MOUTH ONCE DAILY    Dysthymia       simvastatin 10 MG tablet    ZOCOR    90 tablet    TAKE 1 TABLET BY MOUTH AT BEDTIME    Hypercholesterolemia       T.E.D. BELOW KNEE/L-REGULAR Misc     6 each    Apply in am and take off in the evening    Swelling of limb       terazosin 10 MG capsule    HYTRIN    90 capsule    TAKE 1 CAPSULE BY MOUTH NIGHTLY AT BEDTIME    Essential hypertension       traMADol 50 MG tablet    ULTRAM    90 tablet    Take 1 tablet (50 mg) by mouth every 8 hours as needed for moderate pain    Right hip pain       TYLENOL PO      Take 500 mg by mouth Takes 2 tablets at HS        UNABLE TO FIND      CPAP        UNISTIK 3 NORMAL Misc     100 each    USE TO TEST BLOOD SUGARS TWO TIMES A DAY    Diabetes mellitus, type 2 (H)       VITAMIN C PO      Take by mouth daily        VITAMIN D HIGH POTENCY 1000 UNITS Caps     90 capsule    TAKE 1 CAPSULE BY MOUTH DAILY    Vitamin D deficiency       vitamin E 100 UNIT capsule    TOCOPHEROL     Take 100 Units by mouth daily Uncertain of dose        * Notice:  This list has 3 medication(s) that are the same as other medications prescribed for you. Read the directions carefully, and ask your doctor or other care provider to review them with you.

## 2017-10-18 NOTE — PROGRESS NOTES
Trey Washington    October 18, 2017    Chief Complaint   Patient presents with     Diabetes       SUBJECTIVE:  Here for f/u.  Very pleased and doing very well.  Caregiver confirms.  Walking and breathing way better than previous.  He has worked hard and continues to do so.  Pain stable and ultram helpful.  Uses cpap nightly.  Compliant with dm meds.  Feels the best he has felt in years and very pleased.      Past Medical History:   Diagnosis Date     Chronic kidney disease, stage III (moderate)      Obesity, unspecified      Obstructive sleep apnea (adult) (pediatric)      Osteoarthrosis, unspecified whether generalized or localized, lower leg      Other abnormal blood chemistry     hyperuricemia     Other B-complex deficiencies      Other choreas     Hemiballism     Pure hypercholesterolemia      Skin ulcer (H)      Type II or unspecified type diabetes mellitus without mention of complication, not stated as uncontrolled      Unspecified essential hypertension        Past Surgical History:   Procedure Laterality Date     COLONOSCOPY       ENT SURGERY      lanced abcess in left ear     GENITOURINARY SURGERY      removal of kidney stones     ORTHOPEDIC SURGERY      knee arthroscopy     ORTHOPEDIC SURGERY  2003    bilateral knee replacement     ORTHOPEDIC SURGERY      right knee arthroscopy     PHACOEMULSIFICATION WITH STANDARD INTRAOCULAR LENS IMPLANT  2/11/2014    Procedure: PHACOEMULSIFICATION WITH STANDARD INTRAOCULAR LENS IMPLANT;  CATARACT EXTRACTION WITH INTRA OCULAR LENS RIGHT;  Surgeon: Luis James MD;  Location: [facility]     PHACOEMULSIFICATION WITH STANDARD INTRAOCULAR LENS IMPLANT  3/11/2014    Procedure: PHACOEMULSIFICATION WITH STANDARD INTRAOCULAR LENS IMPLANT;  CATARACT EXTRACTION WITH INTRA OCULAR LENS LEFT  ;  Surgeon: Luis James MD;  Location: [facility]     TONSILLECTOMY & ADENOIDECTOMY  1942       Current Outpatient Prescriptions   Medication Sig Dispense Refill     simvastatin (ZOCOR) 10 MG  tablet TAKE 1 TABLET BY MOUTH AT BEDTIME 90 tablet 1     polyethylene glycol (MIRALAX/GLYCOLAX) powder TAKE 17 GRAMS BY MOUTH DAILY MIXED AS DIRECTED. 527 g 8     allopurinol (ZYLOPRIM) 300 MG tablet Take 1 tablet (300 mg) by mouth daily 90 tablet 3     levETIRAcetam 1000 MG TABS TAKE 1 TABLET BY MOUTH TWICE DAILY 60 tablet 5     metFORMIN (GLUCOPHAGE) 1000 MG tablet TAKE 1 TABLET BY MOUTH EVERY DAY WITH A MEAL 90 tablet 0     Cholecalciferol (VITAMIN D HIGH POTENCY) 1000 UNITS CAPS TAKE 1 CAPSULE BY MOUTH DAILY 90 capsule 3     traMADol (ULTRAM) 50 MG tablet Take 1 tablet (50 mg) by mouth every 8 hours as needed for moderate pain 90 tablet 3     METOPROLOL TARTRATE PO Take 25 mg by mouth 2 times daily       NOVOLOG FLEXPEN 100 UNIT/ML soln INJECT 8 UNITS SUBCUTANEOUS BEFORE BEDTIME IN ADDITION TO SLIDING SCALE 9 mL 0     DOCQLACE 100 MG capsule TAKE 1 CAPSULE BY MOUTH TWICE DAILY 100 capsule 1     cyanocolbalamin (VITAMIN  B-12) 500 MCG tablet TAKE 2 TABLETS (1,000 MCG) BY MOUTH DAILY 180 tablet 1     NOVOFINE 30G X 8 MM insulin pen needle USE 4 PENS NEEDLES DAILY OR AS DIRECTED 100 each 6     LANTUS SOLOSTAR 100 UNIT/ML soln INJECT 18 UNITS SUBCUTANEOUSLY EVERY DAY AT BEDTIME 15 mL 0     Lancets Misc. (UNISTIK 3 NORMAL) MISC USE TO TEST BLOOD SUGARS TWO TIMES A  each 2     terazosin (HYTRIN) 10 MG capsule TAKE 1 CAPSULE BY MOUTH NIGHTLY AT BEDTIME 90 capsule 0     captopril (CAPOTEN) 50 MG tablet Take 2 tablets (100 mg) by mouth 3 times daily 270 tablet 3     fish oil-omega-3 fatty acids 1000 MG capsule Take 1,000 mg by mouth daily       sertraline (ZOLOFT) 100 MG tablet TAKE 1 TABLET BY MOUTH ONCE DAILY 90 tablet 3     Acetaminophen (TYLENOL PO) Take 500 mg by mouth Takes 2 tablets at HS       blood glucose monitoring (KEO CONTOUR MONITOR) meter device kit Use to test blood sugars 2 times daily or as directed. 1 kit 0     blood glucose monitoring (KEO CONTOUR) test strip Use to test blood sugars 2  "times daily or as directed. 200 each 3     blood glucose (KEO MICROLET 2) lancing device Use to test blood sugars 2 times daily or as directed. 200 each 3     Silver-Carboxymethylcellulose (AQUACEL-AG EXTRA HYDROFIBER) 4\"X5\" PADS Externally apply 0.25 each topically every other day Use as directed by wound center 5 each 3     order for DME 4x4 bulk SOFT gauze (disp 1 sleeve, refill x 5) 1 Units 0     Ascorbic Acid (VITAMIN C PO) Take by mouth daily       vitamin E (TOCOPHEROL) 100 UNIT capsule Take 100 Units by mouth daily Uncertain of dose       miconazole (MICATIN) 2 % AERP Apply 113 g topically 2 times daily 1 each 3     order for DME Equipment being ordered: Meplilex Transfer 6 X 8 inch (027996) - disp 3;  Aquacel AG 4 X 5 ([id number]); disp 3 - Refills 6 1 each 6     order for DME Equipment being ordered: Oxygen 2 LPM per nasal cannula during the day, portable also 1 each 11     order for DME Equipment being ordered: Wheelchair 1 each 0     Elastic Bandages & Supports (T.E.D. BELOW KNEE/L-REGULAR) MISC Apply in am and take off in the evening 6 each 11     ketoconazole (NIZORAL) 2 % cream Apply topically 2 times daily (Patient taking differently: Apply topically 2 times daily as needed ) 120 g 1     calcium carbonate (OS-MARILIN 500 MG [place]. [place]) 500 MG tablet Take 1 tablet (500 mg) by mouth 2 times daily 180 tablet 1     UNABLE TO FIND CPAP       aspirin 81 MG tablet Take 1 tablet by mouth daily         Allergies   Allergen Reactions     Hydrocodone      Intolerance       Penicillins Swelling       Family History   Problem Relation Age of Onset     C.A.D. Father      MI     Other - See Comments Father      shell shocked in the war     DIABETES Paternal Grandmother        Social History     Social History     Marital status:      Spouse name: N/A     Number of children: N/A     Years of education: N/A     Occupational History           retired     Social History Main Topics     Smoking status: " Former Smoker     Years: 10.00     Types: Cigars     Quit date: 9/29/1987     Smokeless tobacco: Never Used     Alcohol use No     Drug use: No     Sexual activity: No      Comment:      Other Topics Concern     Parent/Sibling W/ Cabg, Mi Or Angioplasty Before 65f 55m? No      Service Yes     [employer]     Blood Transfusions Yes     Permits if needed     Seat Belt Yes     Social History Narrative       5 point ROS negative except as noted above in HPI, including Gen., Resp., CV, GI &  system review.     OBJECTIVE:  B/P: 136/88, T: 96.1, P: 67, R: Data Unavailable    GENERAL APPEARANCE: Alert, no acute distress  CV: regular rate and rhythm, no murmur, rub or gallop  RESP: lungs clear to auscultation bilaterally  ABDOMEN: normal bowel sounds, soft, nontender, no hepatosplenomegaly or other masses  Foot exam.  Normal monofilament and pulses bilaterally.    SKIN: no suspicious lesions or rashes to visualized skin  NEURO: Alert, oriented x 3, speech and mentation normal    ASSESSMENT and PLAN:  (E11.9,  Z79.4) Controlled type 2 diabetes mellitus without complication, with long-term current use of insulin (H)  (primary encounter diagnosis)  Comment: discussed.   Plan: Basic metabolic panel, Hemoglobin A1c, Uric         acid        Update labs.  Getting flu shot.  Doing well without issue.     (Z23) Need for prophylactic vaccination and inoculation against influenza  Comment: given.   Plan: FLU VACCINE, INCREASED ANTIGEN, PRESV FREE, AGE        65+ [77937], Vaccine Administration, Initial         [90747]        Given.     (I10) Essential hypertension with goal blood pressure less than 140/90  Comment: recheck stable.   Plan: doing well.     (E66.01) Morbid (severe) obesity due to excess calories (H)  Comment: ongoing  Plan: stable.  Home PT very helpful and he is very pleased as is caregiver Maria Eugenia.      (G47.33) Obstructive sleep apnea  Comment: uses cpap nightly.   Plan: no change there.     (M10.00)  Idiopathic gout, unspecified chronicity, unspecified site  Comment: stable.   Plan: Uric acid        Update uric acid.     (G89.29) Other chronic pain  Comment: discussed.   Plan: Pain Drug Scr UR W Rptd Meds        2 tramadol/day very helpful.  Continue.  Contract and UDS today.         Injectable Influenza Immunization Documentation    1.  Is the person to be vaccinated sick today?   No    2. Does the person to be vaccinated have an allergy to a component   of the vaccine?   No    3. Has the person to be vaccinated ever had a serious reaction   to influenza vaccine in the past?   No    4. Has the person to be vaccinated ever had Guillain-Barré syndrome?   No    Form completed by Tonya Cartwright

## 2017-10-19 DIAGNOSIS — B35.4 TINEA CORPORIS: Primary | ICD-10-CM

## 2017-10-19 ASSESSMENT — ANXIETY QUESTIONNAIRES: GAD7 TOTAL SCORE: 0

## 2017-10-20 RX ORDER — INSULIN GLARGINE 100 [IU]/ML
INJECTION, SOLUTION SUBCUTANEOUS
Qty: 15 ML | Refills: 3 | Status: ON HOLD | OUTPATIENT
Start: 2017-10-20 | End: 2017-12-22

## 2017-10-20 RX ORDER — LANCETS 23 GAUGE
EACH MISCELLANEOUS
Qty: 100 EACH | Refills: 3 | Status: SHIPPED | OUTPATIENT
Start: 2017-10-20 | End: 2018-01-24

## 2017-10-23 NOTE — TELEPHONE ENCOUNTER
Miconazole 2% AERP 113 g  Last visit: 10/18/17  Last refill: 5/3/16 1 each R-3    Dr. Desai to advise on refill. [facility] Miconazole

## 2017-10-24 LAB — PAIN DRUG SCR UR W RPTD MEDS: NORMAL

## 2017-10-30 DIAGNOSIS — I10 ESSENTIAL HYPERTENSION: ICD-10-CM

## 2017-10-31 NOTE — TELEPHONE ENCOUNTER
Lancets       Last Written Prescription Date: 9/28/2016  Last Fill Quantity: 200,  # refills: 3   Last Office Visit with [place], [facility] or  Health prescribing provider: 10/18/2017                                                 HYTRIN  Last Written Prescription Date: 4/27/2017  Last Fill Quantity: 90,  # refills: 0   Last Office Visit with [facility], [facility] or [facility] prescribing provider: 10/18/2017

## 2017-11-02 RX ORDER — TERAZOSIN 10 MG/1
CAPSULE ORAL
Qty: 90 CAPSULE | Refills: 2 | Status: SHIPPED | OUTPATIENT
Start: 2017-11-02 | End: 2018-06-02

## 2017-11-13 DIAGNOSIS — I10 HTN (HYPERTENSION): ICD-10-CM

## 2017-11-14 NOTE — TELEPHONE ENCOUNTER
Oyster Shell Calcium (OS-MARILIN 500 MG [place]. [place]) 500 MG tablet     Last Written Prescription Date: 08/06/2014  Last Fill Quantity: 180,  # refills: 1   Last Office Visit with [facility], [facility] or [facility] prescribing provider: 10/18/2017

## 2017-11-15 RX ORDER — CALCIUM CARBONATE 500(1250)
TABLET ORAL
Qty: 180 TABLET | Refills: 2 | Status: SHIPPED | OUTPATIENT
Start: 2017-11-15 | End: 2018-01-01

## 2017-11-27 ENCOUNTER — TELEPHONE (OUTPATIENT)
Dept: FAMILY MEDICINE | Facility: OTHER | Age: 82
End: 2017-11-27

## 2017-11-27 DIAGNOSIS — E11.9 DIABETES MELLITUS, TYPE 2 (H): ICD-10-CM

## 2017-11-27 RX ORDER — INSULIN GLARGINE 100 [IU]/ML
18 INJECTION, SOLUTION SUBCUTANEOUS AT BEDTIME
Qty: 18 ML | Refills: 3 | Status: ON HOLD | OUTPATIENT
Start: 2017-11-27 | End: 2017-12-22

## 2017-11-27 NOTE — TELEPHONE ENCOUNTER
Maria Eugenia calls the pt received a letter stating that the pt's Lantus is not going to be covered by his insurance anymore and they will cover Basaglar.

## 2017-11-28 NOTE — TELEPHONE ENCOUNTER
Rx for Basalgar was sent to the pharmacy and is not covered by insurance. I called Maria Eugenia and left a message to review the letter she received and call back if this doesn't go into effect until next year.

## 2017-12-12 DIAGNOSIS — E78.00 HYPERCHOLESTEROLEMIA: ICD-10-CM

## 2017-12-12 DIAGNOSIS — E10.9 TYPE 1 DIABETES, HBA1C GOAL < 7% (H): ICD-10-CM

## 2017-12-13 NOTE — TELEPHONE ENCOUNTER
Zocor      Last Written Prescription Date: 9/20/2017  Last Fill Quantity: 90,  # refills: 1   Last Office Visit with [facility], [facility] or  Health prescribing provider: 10/18/2017                                             Novolog       Last Written Prescription Date: 7/27/2017  Last Fill Quantity: 9mL,  # refills: 0   Last Office Visit with [facility], [facility] or  Health prescribing provider: 10/18/2017

## 2017-12-14 RX ORDER — SIMVASTATIN 10 MG
TABLET ORAL
Qty: 90 TABLET | Refills: 0 | Status: SHIPPED | OUTPATIENT
Start: 2017-12-14 | End: 2018-01-01

## 2017-12-14 RX ORDER — INSULIN ASPART 100 [IU]/ML
INJECTION, SOLUTION INTRAVENOUS; SUBCUTANEOUS
Qty: 9 ML | Refills: 0 | Status: ON HOLD | OUTPATIENT
Start: 2017-12-14 | End: 2017-12-22

## 2017-12-17 ENCOUNTER — HOSPITAL ENCOUNTER (EMERGENCY)
Facility: HOSPITAL | Age: 82
Discharge: HOME | End: 2017-12-17
Attending: PHYSICIAN ASSISTANT | Admitting: PHYSICIAN ASSISTANT
Payer: MEDICARE

## 2017-12-17 ENCOUNTER — APPOINTMENT (OUTPATIENT)
Dept: GENERAL RADIOLOGY | Facility: HOSPITAL | Age: 82
End: 2017-12-17
Attending: PHYSICIAN ASSISTANT
Payer: MEDICARE

## 2017-12-17 VITALS
TEMPERATURE: 99.9 F | SYSTOLIC BLOOD PRESSURE: 171 MMHG | DIASTOLIC BLOOD PRESSURE: 104 MMHG | RESPIRATION RATE: 16 BRPM | HEART RATE: 74 BPM | OXYGEN SATURATION: 97 %
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
## 2017-12-17 DIAGNOSIS — S89.92XA KNEE INJURY, LEFT, INITIAL ENCOUNTER: ICD-10-CM

## 2017-12-17 LAB
ALBUMIN UR-MCNC: 100 MG/DL
ANION GAP SERPL CALCULATED.3IONS-SCNC: 8 MMOL/L (ref 3–14)
APPEARANCE UR: CLEAR
BACTERIA #/AREA URNS HPF: ABNORMAL /HPF
BASOPHILS # BLD AUTO: 0 10E9/L (ref 0–0.2)
BASOPHILS NFR BLD AUTO: 0.2 %
BILIRUB UR QL STRIP: NEGATIVE
BUN SERPL-MCNC: 42 MG/DL (ref 7–30)
CALCIUM SERPL-MCNC: 8.6 MG/DL (ref 8.5–10.1)
CHLORIDE SERPL-SCNC: 107 MMOL/L (ref 94–109)
CO2 SERPL-SCNC: 26 MMOL/L (ref 20–32)
COLOR UR AUTO: YELLOW
CREAT SERPL-MCNC: 1.74 MG/DL (ref 0.66–1.25)
DIFFERENTIAL METHOD BLD: ABNORMAL
EOSINOPHIL # BLD AUTO: 0 10E9/L (ref 0–0.7)
EOSINOPHIL NFR BLD AUTO: 0.5 %
ERYTHROCYTE [DISTWIDTH] IN BLOOD BY AUTOMATED COUNT: 13.5 % (ref 10–15)
GFR SERPL CREATININE-BSD FRML MDRD: 38 ML/MIN/1.7M2
GLUCOSE SERPL-MCNC: 142 MG/DL (ref 70–99)
GLUCOSE UR STRIP-MCNC: NEGATIVE MG/DL
HCT VFR BLD AUTO: 40.2 % (ref 40–53)
HGB BLD-MCNC: 13.3 G/DL (ref 13.3–17.7)
HGB UR QL STRIP: ABNORMAL
IMM GRANULOCYTES # BLD: 0 10E9/L (ref 0–0.4)
IMM GRANULOCYTES NFR BLD: 0.7 %
KETONES UR STRIP-MCNC: NEGATIVE MG/DL
LEUKOCYTE ESTERASE UR QL STRIP: NEGATIVE
LYMPHOCYTES # BLD AUTO: 0.5 10E9/L (ref 0.8–5.3)
LYMPHOCYTES NFR BLD AUTO: 8 %
MCH RBC QN AUTO: 32.3 PG (ref 26.5–33)
MCHC RBC AUTO-ENTMCNC: 33.1 G/DL (ref 31.5–36.5)
MCV RBC AUTO: 98 FL (ref 78–100)
MONOCYTES # BLD AUTO: 0.6 10E9/L (ref 0–1.3)
MONOCYTES NFR BLD AUTO: 10.2 %
NEUTROPHILS # BLD AUTO: 4.7 10E9/L (ref 1.6–8.3)
NEUTROPHILS NFR BLD AUTO: 80.4 %
NITRATE UR QL: NEGATIVE
NRBC # BLD AUTO: 0 10*3/UL
NRBC BLD AUTO-RTO: 0 /100
PH UR STRIP: 5.5 PH (ref 4.7–8)
PLATELET # BLD AUTO: 132 10E9/L (ref 150–450)
POTASSIUM SERPL-SCNC: 4.3 MMOL/L (ref 3.4–5.3)
RBC # BLD AUTO: 4.12 10E12/L (ref 4.4–5.9)
RBC #/AREA URNS AUTO: 1 /HPF (ref 0–2)
SODIUM SERPL-SCNC: 141 MMOL/L (ref 133–144)
SOURCE: ABNORMAL
SP GR UR STRIP: 1.02 (ref 1–1.03)
SQUAMOUS #/AREA URNS AUTO: 1 /HPF (ref 0–1)
UROBILINOGEN UR STRIP-MCNC: NORMAL MG/DL (ref 0–2)
WBC # BLD AUTO: 5.8 10E9/L (ref 4–11)
WBC #/AREA URNS AUTO: 1 /HPF (ref 0–2)

## 2017-12-17 PROCEDURE — 99284 EMERGENCY DEPT VISIT MOD MDM: CPT

## 2017-12-17 PROCEDURE — 36415 COLL VENOUS BLD VENIPUNCTURE: CPT | Performed by: PHYSICIAN ASSISTANT

## 2017-12-17 PROCEDURE — 99283 EMERGENCY DEPT VISIT LOW MDM: CPT | Performed by: PHYSICIAN ASSISTANT

## 2017-12-17 PROCEDURE — 80048 BASIC METABOLIC PNL TOTAL CA: CPT | Performed by: PHYSICIAN ASSISTANT

## 2017-12-17 PROCEDURE — 81001 URINALYSIS AUTO W/SCOPE: CPT | Performed by: PHYSICIAN ASSISTANT

## 2017-12-17 PROCEDURE — 73562 X-RAY EXAM OF KNEE 3: CPT | Mod: TC,LT

## 2017-12-17 PROCEDURE — 85025 COMPLETE CBC W/AUTO DIFF WBC: CPT | Performed by: PHYSICIAN ASSISTANT

## 2017-12-17 NOTE — ED NOTES
Discharge instructions reviewed with patient with no questions or concerns. Spoke with [facility] patient's PCA and patient is to call her when he gets home, patient aware. Vital signs stable.

## 2017-12-17 NOTE — ED NOTES
Patient presents via [facility] with complaint of falls. Patient reports he has fell twice today off of his bed onto the floor. Per EMS this is not normal for the patient. Denies any loss of consciousness. Patient denies shortness of breath. Placed on 3L NC which is what patient uses at home. Denies any pain. Denies urinary symptoms. IV placed and call light within reach.

## 2017-12-17 NOTE — ED PROVIDER NOTES
"  History     Chief Complaint   Patient presents with     Fall     2 falls today. Denies loss of consciousness.     The history is provided by the patient. No  was used.     Trey Washington is a 82 year old male with Hx DM, HTN, CKD who presents with 2 falls today. First fall was due to \"sliding\" off the edge of the bed when he was taking his time getting up to bathroom. He was able to ambulate back to bed, but injured his knee when he fell. He then tried again later today to get up and felt weakness, falling again. He believes weakness is related to the knee. He denies any additional injury. No fevers, fatigue or concern for acute illness.     Problem List:    Patient Active Problem List    Diagnosis Date Noted     Chronic pain syndrome 12/19/2017     Priority: Medium     Patient is followed by Moreno Desai MD for ongoing prescription of pain medication.  All refills should only be approved by this provider, or covering partner.    Medication(s): Ultram 50mg.   Maximum quantity per month: #90  Clinic visit frequency required: Q 3 months     Controlled substance agreement:  Encounter-Level CSA - 10/18/2017:          Controlled Substance Agreement - Scan on 10/19/2017 11:09 AM : CONTROLLED SUBSTANCE AGREEMENT (below)              Pain Clinic evaluation in the past: No    DIRE Total Score(s):  No flowsheet data found.    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  Trey Washington has no plan or code status on file. Discussed available resources and provided with information. Pt has paperwork at home but has not completed it yet.  Added by Josefa Amor             Controlled type 2 diabetes mellitus without complication, with long-term current use of insulin (H) 10/05/2016     Priority: Medium     Type 2 diabetes mellitus with diabetic nephropathy, with long-term current use of insulin (H) 10/05/2016     Priority: Medium     Skin ulcer (H)      Priority: Medium     ACP (advance care planning) 04/18/2016     Priority: Medium     Advance Care Planning 4/18/2016: ACP Review of Chart / Resources Provided:  Reviewed chart for advance care plan.  Trey Washington has been provided information and resources to begin or update their advance care plan.  Added by Esperanza Reynoso             Pure hypercholesterolemia      Priority: Medium     Chronic kidney disease, stage III (moderate)      Priority: Medium     Morbid (severe) obesity due to excess calories (H)      Priority: Medium     Problem list name updated by automated process. Provider to review       Obstructive sleep apnea      Priority: Medium     Problem list name updated by automated process. Provider to review       Vitamin B12 deficiency 08/22/2011     Priority: Medium        Past Medical History:    Past Medical History:   Diagnosis Date     Chronic kidney disease, stage III (moderate)      Obesity, unspecified      Obstructive sleep apnea (adult) (pediatric)      Osteoarthrosis, unspecified whether generalized or localized, lower leg      Other abnormal blood chemistry      Other B-complex deficiencies      Other choreas      Pure hypercholesterolemia      Skin ulcer (H)      Type II or unspecified type diabetes mellitus without mention of complication, not stated as uncontrolled      Unspecified essential hypertension        Past Surgical History:    Past Surgical History:    Procedure Laterality Date     COLONOSCOPY       ENT SURGERY      lanced abcess in left ear     GENITOURINARY SURGERY      removal of kidney stones     ORTHOPEDIC SURGERY      knee arthroscopy     ORTHOPEDIC SURGERY  2003    bilateral knee replacement     ORTHOPEDIC SURGERY      right knee arthroscopy     PHACOEMULSIFICATION WITH STANDARD INTRAOCULAR LENS IMPLANT  2/11/2014    Procedure: PHACOEMULSIFICATION WITH STANDARD INTRAOCULAR LENS IMPLANT;  CATARACT EXTRACTION WITH INTRA OCULAR LENS RIGHT;  Surgeon: Luis James MD;  Location: [facility]     PHACOEMULSIFICATION WITH STANDARD INTRAOCULAR LENS IMPLANT  3/11/2014    Procedure: PHACOEMULSIFICATION WITH STANDARD INTRAOCULAR LENS IMPLANT;  CATARACT EXTRACTION WITH INTRA OCULAR LENS LEFT  ;  Surgeon: Luis James MD;  Location: [facility]     TONSILLECTOMY & ADENOIDECTOMY  1942       Family History:    Family History   Problem Relation Age of Onset     C.A.D. Father      MI     Other - See Comments Father      shell shocked in the war     DIABETES Paternal Grandmother        Social History:  Marital Status:   [5]  Social History   Substance Use Topics     Smoking status: Former Smoker     Years: 10.00     Types: Cigars     Quit date: 9/29/1987     Smokeless tobacco: Never Used     Alcohol use No        Medications:      simvastatin (ZOCOR) 10 MG tablet   NOVOLOG FLEXPEN 100 UNIT/ML soln   metFORMIN (GLUCOPHAGE) 1000 MG tablet   BASAGLAR 100 UNIT/ML injection   blood glucose monitoring (KEO MICROLET) lancets   terazosin (HYTRIN) 10 MG capsule   LANTUS SOLOSTAR 100 UNIT/ML soln   polyethylene glycol (MIRALAX/GLYCOLAX) powder   allopurinol (ZYLOPRIM) 300 MG tablet   levETIRAcetam 1000 MG TABS   Cholecalciferol (VITAMIN D HIGH POTENCY) 1000 UNITS CAPS   traMADol (ULTRAM) 50 MG tablet   METOPROLOL TARTRATE PO   DOCQLACE 100 MG capsule   cyanocolbalamin (VITAMIN  B-12) 500 MCG tablet   captopril (CAPOTEN) 50 MG tablet   fish oil-omega-3 fatty acids 1000 MG capsule  "  sertraline (ZOLOFT) 100 MG tablet   Acetaminophen (TYLENOL PO)   blood glucose monitoring (KEO CONTOUR MONITOR) meter device kit   blood glucose monitoring (KEO CONTOUR) test strip   Ascorbic Acid (VITAMIN C PO)   vitamin E (TOCOPHEROL) 100 UNIT capsule   calcium carbonate (OS-MARILIN 500 MG [place]. [place]) 500 MG tablet   UNABLE TO FIND   aspirin 81 MG tablet   Oyster Shell Calcium (OS-MARILIN 500 MG [place]. [place]) 500 MG tablet   miconazole (ZEASORB-AF) 2 % powder   Lancets Misc. (UNISTIK 3 NORMAL) MISC   NOVOFINE 30G X 8 MM insulin pen needle   Silver-Carboxymethylcellulose (AQUACEL-AG EXTRA HYDROFIBER) 4\"X5\" PADS   order for DME   miconazole (MICATIN) 2 % AERP   order for DME   order for DME   order for DME   Elastic Bandages & Supports (T.E.D. BELOW KNEE/L-REGULAR) MISC   ketoconazole (NIZORAL) 2 % cream         Review of Systems   Constitutional: Negative.    HENT: Negative.    Respiratory: Negative.    Cardiovascular: Negative.    Gastrointestinal: Negative.    Genitourinary: Negative.    Musculoskeletal: Positive for arthralgias.   Skin: Positive for wound.   Neurological: Negative.  Negative for syncope and headaches.   Psychiatric/Behavioral: Negative.        Physical Exam   BP: 118/94  Pulse: 86  Heart Rate: 90  Temp: 97.5  F (36.4  C)  Resp: 20  SpO2: (!) 85 %      Physical Exam   Constitutional: He is oriented to person, place, and time. He appears well-developed and well-nourished. No distress.   HENT:   Head: Normocephalic and atraumatic.   Right Ear: External ear normal. No hemotympanum.   Left Ear: External ear normal. No hemotympanum.   Nose: No rhinorrhea.   Mouth/Throat: Oropharynx is clear and moist. No oral lesions. No lacerations.   Cardiovascular: Normal rate and normal heart sounds.    Pulmonary/Chest: Effort normal and breath sounds normal.   Neurological: He is alert and oriented to person, place, and time.   Skin: Skin is warm and dry.   Psychiatric: He has a normal mood and affect.   Nursing note and " vitals reviewed.      ED Course     ED Course     Procedures    Medications - No data to display    Labs Ordered and Resulted from Time of ED Arrival Up to the Time of Departure from the ED   CBC WITH PLATELETS DIFFERENTIAL - Abnormal; Notable for the following:        Result Value    RBC Count 4.12 (*)     Platelet Count 132 (*)     Absolute Lymphocytes 0.5 (*)     All other components within normal limits   BASIC METABOLIC PANEL - Abnormal; Notable for the following:     Glucose 142 (*)     Urea Nitrogen 42 (*)     Creatinine 1.74 (*)     GFR Estimate 38 (*)     GFR Estimate If Black 46 (*)     All other components within normal limits   URINE MACROSCOPIC WITH REFLEX TO MICRO - Abnormal; Notable for the following:     Blood Urine Moderate (*)     Protein Albumin Urine 100 (*)     Bacteria Urine None (*)     All other components within normal limits     XR KNEE LT 3 VW, 12/17/2017 3:13 PM     History: Male, age 82 years; injury;      Comparison: None.     Technique: Two views were obtained.     FINDINGS: Arthroplasty is satisfactorily positioned. A nondisplaced,  nonacute appearing fracture seen along the superior aspect of the  patella. Medial soft tissue swelling. Calcifications in the  suprapatellar region.         IMPRESSION:   1.  Nondisplaced transverse fracture involving the superior patella,  age unknown. The appearance suggests a nonacute fracture.     2.  Suprapatellar calcifications suggesting calcified synovium.      3.  Medial soft tissue swelling.     COLIN KEMP MD  Assessments & Plan (with Medical Decision Making)     I have reviewed the nursing notes.  I have reviewed the findings, diagnosis, plan and need for follow up with the patient.      Discharge Medication List as of 12/17/2017  3:58 PM          Final diagnoses:   Knee injury, left, initial encounter   Fall from bed, initial encounter   His patella appears fractured, although I am unsure if this is acute. Knee immobilizer and crutches  are not appropriate for Peter at this time, so he is placed in our widest ace wrap and advised to have assistance with ambulation. Pt has home health and attempted contact by ER staff for return home. If symptoms worsening, erythema, swelling increased, will return here prior to f/u with PCP after the weekend.     12/17/2017   [facility]     Erasmo Hughes PA  12/20/17 0949

## 2017-12-17 NOTE — ED AVS SNAPSHOT
[facility]    [address] [address]    Phone:  [phone]                                       Trey Washington   MRN: [id number]    Department:  [facility]   Date of Visit:  12/17/2017           Patient Information     Date Of Birth          1935        Your diagnoses for this visit were:     Knee injury, left, initial encounter     Fall from bed, initial encounter        You were seen by Erasmo Hughes PA.      Follow-up Information     Follow up with Moreno Desai MD In 2 days.    Specialty:  Family Practice    Contact information:    [facility]  [address] [place] [address] [phone]          Follow up with [facility].    Specialty:  EMERGENCY MEDICINE    Why:  If symptoms worsen    Contact information:    [address] [address] [phone]    Additional information:    From [facility]: Take [address]. Turn left at [address]. Turn left at the first stoplight on [address] [place] [address]. At the first stop sign, take a right onto [address]. Take a left into the parking lot and continue through until you reach the North enterance of the building.       From [place]: Take [address]. Take the [address] ramp towards [place]. Turn left onto [address]. Take a slight right onto [address] [facility]. Turn left at the first stoplight on [address]. At the first stop sign, take a right onto [address]. Take a left into the parking lot and continue through until you reach the North enterance of the building.       From [place]: Take [address]. Take a right at [address]. At the first stop sign, take a right onto [address]. Take a left into the parking lot and continue through until you reach the North enterance of the building.         Discharge Instructions       - ACE for compression and weight bearing as tolerated.   -  "Change dressing 1-2x daily. May use over the counter topical antibiotic.   - Follow up early next week with primary.     Discharge References/Attachments     KNEECAP (PATELLA) PROBLEMS, COMMON (ENGLISH)         Review of your medicines      Our records show that you are taking the medicines listed below. If these are incorrect, please call your family doctor or clinic.        Dose / Directions Last dose taken    allopurinol 300 MG tablet   Commonly known as:  ZYLOPRIM   Dose:  1 tablet   Quantity:  90 tablet        Take 1 tablet (300 mg) by mouth daily   Refills:  3        AQUACEL-AG EXTRA HYDROFIBER 4\"X5\" Pads   Dose:  0.25 each   Quantity:  5 each        Externally apply 0.25 each topically every other day Use as directed by wound center   Refills:  3        aspirin 81 MG tablet   Dose:  1 tablet        Take 1 tablet by mouth daily   Refills:  0        blood glucose monitoring lancets   Quantity:  200 each        USE AS DIRECTED TESTING BLOOD SUGAR TWICE DAILY OR AS DIRECTED   Refills:  1        blood glucose monitoring meter device kit   Quantity:  1 kit        Use to test blood sugars 2 times daily or as directed.   Refills:  0        blood glucose monitoring test strip   Commonly known as:  KEO CONTOUR   Quantity:  200 each        Use to test blood sugars 2 times daily or as directed.   Refills:  3        calcium carbonate 1250 MG tablet   Commonly known as:  OS-MARILIN 500 mg [place]. Ca   Dose:  500 mg   Quantity:  180 tablet        Take 1 tablet (500 mg) by mouth 2 times daily   Refills:  1        captopril 50 MG tablet   Commonly known as:  CAPOTEN   Dose:  100 mg   Quantity:  270 tablet        Take 2 tablets (100 mg) by mouth 3 times daily   Refills:  3        cyanocolbalamin 500 MCG tablet   Commonly known as:  vitamin  B-12   Quantity:  180 tablet        TAKE 2 TABLETS (1,000 MCG) BY MOUTH DAILY   Refills:  1        DOCQLACE 100 MG capsule   Quantity:  100 capsule   Generic drug:  docusate sodium        TAKE " 1 CAPSULE BY MOUTH TWICE DAILY   Refills:  1        fish oil-omega-3 fatty acids 1000 MG capsule   Dose:  1000 mg        Take 1,000 mg by mouth daily   Refills:  0        ketoconazole 2 % cream   Commonly known as:  NIZORAL   Quantity:  120 g        Apply topically 2 times daily   Refills:  1        * LANTUS SOLOSTAR 100 UNIT/ML injection   Quantity:  15 mL   Generic drug:  insulin glargine        INJECT 18 UNITS SUBQUTANEOUSLY EVERY DAY AT BEDTIME   Refills:  3        * BASAGLAR 100 UNIT/ML injection   Dose:  18 Units   Quantity:  18 mL        Inject 18 Units Subcutaneous At Bedtime   Refills:  3        levETIRAcetam 1000 MG Tabs   Quantity:  60 tablet        TAKE 1 TABLET BY MOUTH TWICE DAILY   Refills:  5        metFORMIN 1000 MG tablet   Commonly known as:  GLUCOPHAGE   Quantity:  90 tablet        TAKE 1 TABLET BY MOUTH EVERY DAY WITH A MEAL   Refills:  1        METOPROLOL TARTRATE PO   Dose:  25 mg        Take 25 mg by mouth 2 times daily   Refills:  0        * miconazole 2 % Aerp powder   Commonly known as:  MICATIN   Dose:  113 g   Quantity:  1 each        Apply 113 g topically 2 times daily   Refills:  3        * miconazole 2 % powder   Commonly known as:  ZEASORB-AF   Quantity:  71 g        Apply topically as needed for itching or other (apply topically to affected areas twice daily)   Refills:  3        NOVOFINE 30G X 8 MM   Quantity:  100 each   Generic drug:  insulin pen needle        USE 4 PENS NEEDLES DAILY OR AS DIRECTED   Refills:  6        NovoLOG FLEXPEN 100 UNIT/ML injection   Quantity:  9 mL   Generic drug:  insulin aspart        INJECT 8 UNITS SUBCUTANEOUS BEFORE BEDTIME IN ADDITION TO SLIDING SCALE   Refills:  0        * order for DME   Quantity:  1 each        Equipment being ordered: Oxygen 2 LPM per nasal cannula during the day, portable also   Refills:  11        * order for DME   Quantity:  1 each        Equipment being ordered: Wheelchair   Refills:  0        * order for DME   Quantity:   1 each        Equipment being ordered: Meplilex Transfer 6 X 8 inch (118951) - disp 3;  Aquacel AG 4 X 5 ([id number]); disp 3 - Refills 6   Refills:  6        order for DME   Quantity:  1 Units        4x4 bulk SOFT gauze (disp 1 sleeve, refill x 5)   Refills:  0        Oyster Shell Calcium 500 MG tablet   Commonly known as:  OS-Morris 500 mg [place]. Ca   Quantity:  180 tablet        TAKE 1 TABLET BY MOUTH TWICE DAILY   Refills:  2        polyethylene glycol powder   Commonly known as:  MIRALAX/GLYCOLAX   Quantity:  527 g        TAKE 17 GRAMS BY MOUTH DAILY MIXED AS DIRECTED.   Refills:  8        sertraline 100 MG tablet   Commonly known as:  ZOLOFT   Quantity:  90 tablet        TAKE 1 TABLET BY MOUTH ONCE DAILY   Refills:  3        simvastatin 10 MG tablet   Commonly known as:  ZOCOR   Quantity:  90 tablet        TAKE 1 TABLET BY MOUTH NIGHTLY AT BEDTIME   Refills:  0        T.E.D. BELOW KNEE/L-REGULAR Misc   Quantity:  6 each        Apply in am and take off in the evening   Refills:  11        terazosin 10 MG capsule   Commonly known as:  HYTRIN   Quantity:  90 capsule        TAKE 1 CAPSULE BY MOUTH NIGHTLY AT BEDTIME   Refills:  2        traMADol 50 MG tablet   Commonly known as:  ULTRAM   Dose:  50 mg   Quantity:  90 tablet        Take 1 tablet (50 mg) by mouth every 8 hours as needed for moderate pain   Refills:  3        TYLENOL PO   Dose:  500 mg        Take 500 mg by mouth Takes 2 tablets at HS   Refills:  0        UNABLE TO FIND        CPAP   Refills:  0        UNISTIK 3 NORMAL Misc   Quantity:  100 each        USE TO TEST BLOOD SUGARS TWO TIMES A DAY   Refills:  3        VITAMIN C PO        Take by mouth daily   Refills:  0        VITAMIN D HIGH POTENCY 1000 UNITS Caps   Quantity:  90 capsule        TAKE 1 CAPSULE BY MOUTH DAILY   Refills:  3        vitamin E 100 UNIT capsule   Commonly known as:  TOCOPHEROL   Dose:  100 Units        Take 100 Units by mouth daily Uncertain of dose   Refills:  0        * Notice:   This list has 7 medication(s) that are the same as other medications prescribed for you. Read the directions carefully, and ask your doctor or other care provider to review them with you.            Procedures and tests performed during your visit     Basic metabolic panel    CBC with platelets differential    Knee XR, 3 views, left    UA reflex to Microscopic      Orders Needing Specimen Collection     None      Pending Results     Date and Time Order Name Status Description    12/17/2017 1449 Knee XR, 3 views, left In process             Pending Culture Results     No orders found from 12/15/2017 to 12/18/2017.            Thank you for choosing [facility]       Thank you for choosing [facility] for your care. Our goal is always to provide you with excellent care. Hearing back from our patients is one way we can continue to improve our services. Please take a few minutes to complete the written survey that you may receive in the mail after you visit with us. Thank you!        [facility] Information     [employer] gives you secure access to your electronic health record. If you see a primary care provider, you can also send messages to your care team and make appointments. If you have questions, please call your primary care clinic.  If you do not have a primary care provider, please call [phone] and they will assist you.        Care EveryWhere ID     This is your Care EveryWhere ID. This could be used by other organizations to access your [facility] medical records  [id number]        Equal Access to Services     WISAM SANTIAGO : Lashae Joyner, waaxda luqadaha, qaybta kaalmada adegrey, de martínez. So [facility] [phone].    ATENCIÓN: Si habla español, tiene a cabrera disposición servicios gratuitos de asistencia lingüística. Llame al [phone].    We comply with applicable federal civil rights laws and [place] laws. We do not discriminate on the basis of race, color, national  origin, age, disability, sex, sexual orientation, or gender identity.            After Visit Summary       This is your record. Keep this with you and show to your community pharmacist(s) and doctor(s) at your next visit.

## 2017-12-17 NOTE — ED AVS SNAPSHOT
[facility]    [address]    [place] [address]    Phone:  [phone]                                       Trey Washington   MRN: [id number]    Department[facility]  [facility]   Date of Visit:  12/17/2017           After Visit Summary Signature Page     I have received my discharge instructions, and my questions have been answered. I have discussed any challenges I see with this plan with the nurse or doctor.    ..........................................................................................................................................  Patient/Patient Representative Signature      ..........................................................................................................................................  Patient Representative Print Name and Relationship to Patient    ..................................................               ................................................  Date                                            Time    ..........................................................................................................................................  Reviewed by Signature/Title    ...................................................              ..............................................  Date                                                            Time

## 2017-12-17 NOTE — DISCHARGE INSTRUCTIONS
- ACE for compression and weight bearing as tolerated.   - Change dressing 1-2x daily. May use over the counter topical antibiotic.   - Follow up early next week with primary.

## 2017-12-18 ENCOUNTER — TELEPHONE (OUTPATIENT)
Dept: FAMILY MEDICINE | Facility: OTHER | Age: 82
End: 2017-12-18

## 2017-12-18 NOTE — TELEPHONE ENCOUNTER
Adri calvert pt was unable to walk on Saturday and Sunday. He was brought to the ER on Sunday and they did not find anything wrong with him and sent him home. They advised he FU with you this week, but she has no way to get him into the office. She is unable to assist at home if he is unable to walk. She is wondering if he could get into a nursing home/rehab center temporarily. He would like to get into [facility].

## 2017-12-18 NOTE — TELEPHONE ENCOUNTER
I called Adri and advised she contact [facility] to discuss admit and possible medical transportation.

## 2017-12-19 PROBLEM — G89.4 CHRONIC PAIN SYNDROME: Chronic | Status: ACTIVE | Noted: 2017-12-19

## 2017-12-19 NOTE — TELEPHONE ENCOUNTER
Perhaps my last clinic visit or most recent cpe type visit will do?  I'm not sure what else to do.  There has to be some way to do this and if she cannot get anywhere she basically has to have ambulance bring him to hospital and social work needs to be involved.  Thanks.  Moreno Desai

## 2017-12-19 NOTE — TELEPHONE ENCOUNTER
Adri calls back she has not been able to get anywhere with the nursing home. I called [facility] the pt needs a physical. She states the ER visit will not count. Please advise. He is unable to walk/get into the clinic.

## 2017-12-20 ASSESSMENT — ENCOUNTER SYMPTOMS
CARDIOVASCULAR NEGATIVE: 1
PSYCHIATRIC NEGATIVE: 1
WOUND: 1
HEADACHES: 0
RESPIRATORY NEGATIVE: 1
NEUROLOGICAL NEGATIVE: 1
ARTHRALGIAS: 1
GASTROINTESTINAL NEGATIVE: 1
CONSTITUTIONAL NEGATIVE: 1

## 2017-12-21 ENCOUNTER — APPOINTMENT (OUTPATIENT)
Dept: GENERAL RADIOLOGY | Facility: HOSPITAL | Age: 82
End: 2017-12-21
Attending: FAMILY MEDICINE
Payer: MEDICARE

## 2017-12-21 ENCOUNTER — HOSPITAL ENCOUNTER (OUTPATIENT)
Facility: HOSPITAL | Age: 82
Setting detail: OBSERVATION
Discharge: NURSING, REHAB OR LONG-TERM CARE | End: 2017-12-22
Attending: FAMILY MEDICINE | Admitting: INTERNAL MEDICINE
Payer: MEDICARE

## 2017-12-21 DIAGNOSIS — Z79.4 CONTROLLED TYPE 2 DIABETES MELLITUS WITHOUT COMPLICATION, WITH LONG-TERM CURRENT USE OF INSULIN (H): ICD-10-CM

## 2017-12-21 DIAGNOSIS — E11.21 TYPE 2 DIABETES MELLITUS WITH DIABETIC NEPHROPATHY, WITH LONG-TERM CURRENT USE OF INSULIN (H): Primary | ICD-10-CM

## 2017-12-21 DIAGNOSIS — R53.81 PHYSICAL DECONDITIONING: ICD-10-CM

## 2017-12-21 DIAGNOSIS — R53.1 WEAKNESS: ICD-10-CM

## 2017-12-21 DIAGNOSIS — E11.9 CONTROLLED TYPE 2 DIABETES MELLITUS WITHOUT COMPLICATION, WITH LONG-TERM CURRENT USE OF INSULIN (H): ICD-10-CM

## 2017-12-21 DIAGNOSIS — E66.01 MORBID (SEVERE) OBESITY DUE TO EXCESS CALORIES (H): ICD-10-CM

## 2017-12-21 DIAGNOSIS — E10.9 TYPE 1 DIABETES, HBA1C GOAL < 7% (H): ICD-10-CM

## 2017-12-21 DIAGNOSIS — M25.551 RIGHT HIP PAIN: ICD-10-CM

## 2017-12-21 DIAGNOSIS — E11.9 TYPE 2 DIABETES MELLITUS WITHOUT COMPLICATION (H): ICD-10-CM

## 2017-12-21 DIAGNOSIS — Z79.4 TYPE 2 DIABETES MELLITUS WITH DIABETIC NEPHROPATHY, WITH LONG-TERM CURRENT USE OF INSULIN (H): Primary | ICD-10-CM

## 2017-12-21 DIAGNOSIS — G89.4 CHRONIC PAIN SYNDROME: Chronic | ICD-10-CM

## 2017-12-21 LAB
ALBUMIN SERPL-MCNC: 3 G/DL (ref 3.4–5)
ALBUMIN UR-MCNC: 30 MG/DL
ALP SERPL-CCNC: 52 U/L (ref 40–150)
ALT SERPL W P-5'-P-CCNC: 23 U/L (ref 0–70)
ANION GAP SERPL CALCULATED.3IONS-SCNC: 8 MMOL/L (ref 3–14)
APPEARANCE UR: CLEAR
AST SERPL W P-5'-P-CCNC: 36 U/L (ref 0–45)
BACTERIA #/AREA URNS HPF: ABNORMAL /HPF
BASOPHILS # BLD AUTO: 0 10E9/L (ref 0–0.2)
BASOPHILS NFR BLD AUTO: 0.3 %
BILIRUB SERPL-MCNC: 0.3 MG/DL (ref 0.2–1.3)
BILIRUB UR QL STRIP: NEGATIVE
BUN SERPL-MCNC: 32 MG/DL (ref 7–30)
CALCIUM SERPL-MCNC: 9.4 MG/DL (ref 8.5–10.1)
CHLORIDE SERPL-SCNC: 106 MMOL/L (ref 94–109)
CO2 SERPL-SCNC: 28 MMOL/L (ref 20–32)
COLOR UR AUTO: YELLOW
CREAT SERPL-MCNC: 1.36 MG/DL (ref 0.66–1.25)
DIFFERENTIAL METHOD BLD: ABNORMAL
EOSINOPHIL # BLD AUTO: 0.1 10E9/L (ref 0–0.7)
EOSINOPHIL NFR BLD AUTO: 2 %
ERYTHROCYTE [DISTWIDTH] IN BLOOD BY AUTOMATED COUNT: 13.2 % (ref 10–15)
GFR SERPL CREATININE-BSD FRML MDRD: 50 ML/MIN/1.7M2
GLUCOSE BLDC GLUCOMTR-MCNC: 128 MG/DL (ref 70–99)
GLUCOSE SERPL-MCNC: 139 MG/DL (ref 70–99)
GLUCOSE UR STRIP-MCNC: NEGATIVE MG/DL
HBA1C MFR BLD: 6 % (ref 4.3–6)
HCT VFR BLD AUTO: 39.9 % (ref 40–53)
HGB BLD-MCNC: 12.9 G/DL (ref 13.3–17.7)
HGB UR QL STRIP: NEGATIVE
IMM GRANULOCYTES # BLD: 0 10E9/L (ref 0–0.4)
IMM GRANULOCYTES NFR BLD: 0.3 %
KETONES UR STRIP-MCNC: NEGATIVE MG/DL
LEUKOCYTE ESTERASE UR QL STRIP: NEGATIVE
LYMPHOCYTES # BLD AUTO: 1.4 10E9/L (ref 0.8–5.3)
LYMPHOCYTES NFR BLD AUTO: 23.7 %
MCH RBC QN AUTO: 31.8 PG (ref 26.5–33)
MCHC RBC AUTO-ENTMCNC: 32.3 G/DL (ref 31.5–36.5)
MCV RBC AUTO: 98 FL (ref 78–100)
MONOCYTES # BLD AUTO: 0.6 10E9/L (ref 0–1.3)
MONOCYTES NFR BLD AUTO: 10.6 %
MUCOUS THREADS #/AREA URNS LPF: PRESENT /LPF
NEUTROPHILS # BLD AUTO: 3.7 10E9/L (ref 1.6–8.3)
NEUTROPHILS NFR BLD AUTO: 63.1 %
NITRATE UR QL: NEGATIVE
NRBC # BLD AUTO: 0 10*3/UL
NRBC BLD AUTO-RTO: 0 /100
PH UR STRIP: 5.5 PH (ref 4.7–8)
PLATELET # BLD AUTO: 160 10E9/L (ref 150–450)
POTASSIUM SERPL-SCNC: 4.4 MMOL/L (ref 3.4–5.3)
PROT SERPL-MCNC: 7.1 G/DL (ref 6.8–8.8)
RBC # BLD AUTO: 4.06 10E12/L (ref 4.4–5.9)
RBC #/AREA URNS AUTO: 1 /HPF (ref 0–2)
SODIUM SERPL-SCNC: 142 MMOL/L (ref 133–144)
SOURCE: ABNORMAL
SP GR UR STRIP: 1.01 (ref 1–1.03)
UROBILINOGEN UR STRIP-MCNC: NORMAL MG/DL (ref 0–2)
WBC # BLD AUTO: 5.9 10E9/L (ref 4–11)
WBC #/AREA URNS AUTO: 1 /HPF (ref 0–2)

## 2017-12-21 PROCEDURE — G0378 HOSPITAL OBSERVATION PER HR: HCPCS

## 2017-12-21 PROCEDURE — 83036 HEMOGLOBIN GLYCOSYLATED A1C: CPT | Performed by: INTERNAL MEDICINE

## 2017-12-21 PROCEDURE — 25000131 ZZH RX MED GY IP 250 OP 636 PS 637: Mod: GY | Performed by: INTERNAL MEDICINE

## 2017-12-21 PROCEDURE — 99285 EMERGENCY DEPT VISIT HI MDM: CPT | Performed by: FAMILY MEDICINE

## 2017-12-21 PROCEDURE — 99285 EMERGENCY DEPT VISIT HI MDM: CPT | Mod: 25

## 2017-12-21 PROCEDURE — 40000786 ZZHCL STATISTIC ACTIVE MRSA SURVEILLANCE CULTURE: Performed by: INTERNAL MEDICINE

## 2017-12-21 PROCEDURE — 81001 URINALYSIS AUTO W/SCOPE: CPT | Performed by: FAMILY MEDICINE

## 2017-12-21 PROCEDURE — 99220 ZZC INITIAL OBSERVATION CARE,LEVL III: CPT | Performed by: INTERNAL MEDICINE

## 2017-12-21 PROCEDURE — A9270 NON-COVERED ITEM OR SERVICE: HCPCS | Mod: GY | Performed by: INTERNAL MEDICINE

## 2017-12-21 PROCEDURE — 40000275 ZZH STATISTIC RCP TIME EA 10 MIN

## 2017-12-21 PROCEDURE — 71010 XR CHEST PORT 1 VW: CPT | Mod: TC

## 2017-12-21 PROCEDURE — 36415 COLL VENOUS BLD VENIPUNCTURE: CPT | Performed by: FAMILY MEDICINE

## 2017-12-21 PROCEDURE — 00000146 ZZHCL STATISTIC GLUCOSE BY METER IP

## 2017-12-21 PROCEDURE — 80053 COMPREHEN METABOLIC PANEL: CPT | Performed by: FAMILY MEDICINE

## 2017-12-21 PROCEDURE — 40000788 ZZHCL STATISTIC ESTIMATED AVERAGE GLUCOSE: Performed by: INTERNAL MEDICINE

## 2017-12-21 PROCEDURE — 85025 COMPLETE CBC W/AUTO DIFF WBC: CPT | Performed by: FAMILY MEDICINE

## 2017-12-21 PROCEDURE — 25000132 ZZH RX MED GY IP 250 OP 250 PS 637: Mod: GY | Performed by: INTERNAL MEDICINE

## 2017-12-21 RX ORDER — SIMVASTATIN 10 MG
10 TABLET ORAL AT BEDTIME
Status: DISCONTINUED | OUTPATIENT
Start: 2017-12-21 | End: 2017-12-22 | Stop reason: HOSPADM

## 2017-12-21 RX ORDER — SERTRALINE HYDROCHLORIDE 100 MG/1
100 TABLET, FILM COATED ORAL DAILY
Status: DISCONTINUED | OUTPATIENT
Start: 2017-12-22 | End: 2017-12-22 | Stop reason: HOSPADM

## 2017-12-21 RX ORDER — NICOTINE POLACRILEX 4 MG
15-30 LOZENGE BUCCAL
Status: DISCONTINUED | OUTPATIENT
Start: 2017-12-21 | End: 2017-12-22

## 2017-12-21 RX ORDER — ASPIRIN 81 MG/1
81 TABLET, CHEWABLE ORAL DAILY
Status: DISCONTINUED | OUTPATIENT
Start: 2017-12-22 | End: 2017-12-22 | Stop reason: HOSPADM

## 2017-12-21 RX ORDER — NICOTINE POLACRILEX 4 MG
15-30 LOZENGE BUCCAL
Status: DISCONTINUED | OUTPATIENT
Start: 2017-12-21 | End: 2017-12-22 | Stop reason: HOSPADM

## 2017-12-21 RX ORDER — ALLOPURINOL 300 MG/1
300 TABLET ORAL DAILY
Status: DISCONTINUED | OUTPATIENT
Start: 2017-12-22 | End: 2017-12-22 | Stop reason: HOSPADM

## 2017-12-21 RX ORDER — METOPROLOL TARTRATE 25 MG/1
25 TABLET, FILM COATED ORAL 2 TIMES DAILY
Status: DISCONTINUED | OUTPATIENT
Start: 2017-12-21 | End: 2017-12-22 | Stop reason: HOSPADM

## 2017-12-21 RX ORDER — DOCUSATE SODIUM 100 MG/1
100 CAPSULE, LIQUID FILLED ORAL 2 TIMES DAILY
Status: DISCONTINUED | OUTPATIENT
Start: 2017-12-21 | End: 2017-12-22 | Stop reason: HOSPADM

## 2017-12-21 RX ORDER — POLYETHYLENE GLYCOL 3350 17 G/17G
17 POWDER, FOR SOLUTION ORAL DAILY
Status: DISCONTINUED | OUTPATIENT
Start: 2017-12-22 | End: 2017-12-22 | Stop reason: HOSPADM

## 2017-12-21 RX ORDER — DEXTROSE MONOHYDRATE 25 G/50ML
25-50 INJECTION, SOLUTION INTRAVENOUS
Status: DISCONTINUED | OUTPATIENT
Start: 2017-12-21 | End: 2017-12-22

## 2017-12-21 RX ORDER — ACETAMINOPHEN 325 MG/1
650 TABLET ORAL EVERY 4 HOURS PRN
Status: DISCONTINUED | OUTPATIENT
Start: 2017-12-21 | End: 2017-12-22 | Stop reason: HOSPADM

## 2017-12-21 RX ORDER — LEVETIRACETAM 500 MG/1
1000 TABLET ORAL 2 TIMES DAILY
Status: DISCONTINUED | OUTPATIENT
Start: 2017-12-21 | End: 2017-12-22 | Stop reason: HOSPADM

## 2017-12-21 RX ORDER — CAPTOPRIL 25 MG/1
50 TABLET ORAL
Status: DISCONTINUED | OUTPATIENT
Start: 2017-12-21 | End: 2017-12-22

## 2017-12-21 RX ORDER — TRAMADOL HYDROCHLORIDE 50 MG/1
50 TABLET ORAL EVERY 8 HOURS PRN
Status: DISCONTINUED | OUTPATIENT
Start: 2017-12-21 | End: 2017-12-22 | Stop reason: HOSPADM

## 2017-12-21 RX ORDER — NALOXONE HYDROCHLORIDE 0.4 MG/ML
.1-.4 INJECTION, SOLUTION INTRAMUSCULAR; INTRAVENOUS; SUBCUTANEOUS
Status: DISCONTINUED | OUTPATIENT
Start: 2017-12-21 | End: 2017-12-22 | Stop reason: HOSPADM

## 2017-12-21 RX ORDER — ACETAMINOPHEN 325 MG/1
1000 TABLET ORAL AT BEDTIME
Status: DISCONTINUED | OUTPATIENT
Start: 2017-12-21 | End: 2017-12-22 | Stop reason: HOSPADM

## 2017-12-21 RX ORDER — DEXTROSE MONOHYDRATE 25 G/50ML
25-50 INJECTION, SOLUTION INTRAVENOUS
Status: DISCONTINUED | OUTPATIENT
Start: 2017-12-21 | End: 2017-12-22 | Stop reason: HOSPADM

## 2017-12-21 RX ORDER — ONDANSETRON 2 MG/ML
4 INJECTION INTRAMUSCULAR; INTRAVENOUS EVERY 6 HOURS PRN
Status: DISCONTINUED | OUTPATIENT
Start: 2017-12-21 | End: 2017-12-22 | Stop reason: HOSPADM

## 2017-12-21 RX ORDER — ACETAMINOPHEN 650 MG/1
650 SUPPOSITORY RECTAL EVERY 4 HOURS PRN
Status: DISCONTINUED | OUTPATIENT
Start: 2017-12-21 | End: 2017-12-22 | Stop reason: HOSPADM

## 2017-12-21 RX ORDER — ONDANSETRON 4 MG/1
4 TABLET, ORALLY DISINTEGRATING ORAL EVERY 6 HOURS PRN
Status: DISCONTINUED | OUTPATIENT
Start: 2017-12-21 | End: 2017-12-22 | Stop reason: HOSPADM

## 2017-12-21 RX ORDER — BISACODYL 10 MG
10 SUPPOSITORY, RECTAL RECTAL DAILY PRN
Status: DISCONTINUED | OUTPATIENT
Start: 2017-12-21 | End: 2017-12-22 | Stop reason: HOSPADM

## 2017-12-21 RX ORDER — NALOXONE HYDROCHLORIDE 0.4 MG/ML
.1-.4 INJECTION, SOLUTION INTRAMUSCULAR; INTRAVENOUS; SUBCUTANEOUS
Status: DISCONTINUED | OUTPATIENT
Start: 2017-12-21 | End: 2017-12-21

## 2017-12-21 RX ORDER — TERAZOSIN 5 MG/1
10 CAPSULE ORAL AT BEDTIME
Status: DISCONTINUED | OUTPATIENT
Start: 2017-12-21 | End: 2017-12-22 | Stop reason: HOSPADM

## 2017-12-21 RX ADMIN — TRAMADOL HYDROCHLORIDE 50 MG: 50 TABLET, COATED ORAL at 21:51

## 2017-12-21 RX ADMIN — SIMVASTATIN 10 MG: 10 TABLET, FILM COATED ORAL at 21:51

## 2017-12-21 RX ADMIN — CAPTOPRIL 50 MG: 25 TABLET ORAL at 21:53

## 2017-12-21 RX ADMIN — INSULIN GLARGINE 10 UNITS: 100 INJECTION, SOLUTION SUBCUTANEOUS at 22:05

## 2017-12-21 RX ADMIN — METOPROLOL TARTRATE 25 MG: 25 TABLET ORAL at 21:51

## 2017-12-21 RX ADMIN — DOCUSATE SODIUM 100 MG: 100 CAPSULE, LIQUID FILLED ORAL at 21:51

## 2017-12-21 RX ADMIN — TERAZOSIN HYDROCHLORIDE 10 MG: 5 CAPSULE ORAL at 21:52

## 2017-12-21 RX ADMIN — ACETAMINOPHEN 975 MG: 325 TABLET, FILM COATED ORAL at 21:51

## 2017-12-21 RX ADMIN — LEVETIRACETAM 1000 MG: 500 TABLET ORAL at 21:51

## 2017-12-21 NOTE — IP AVS SNAPSHOT
` `     [facility]: [phone]            Medication Administration Report for Trey Washington as of 12/22/17 1336   Legend:    Given Hold Not Given Due Canceled Entry Other Actions    Time Time (Time) Time  Time-Action       Inactive    Active    Linked        Medications 12/16/17 12/17/17 12/18/17 12/19/17 12/20/17 12/21/17 12/22/17    acetaminophen (TYLENOL) Suppository 650 mg  Dose: 650 mg Freq: EVERY 4 HOURS PRN Route: RE  PRN Reason: mild pain  Start: 12/21/17 2103   Admin Instructions: Alternate ibuprofen (if ordered) with acetaminophen.  Maximum acetaminophen dose from all sources = 75 mg/kg/day not to exceed 4 grams/day.               acetaminophen (TYLENOL) tablet 650 mg  Dose: 650 mg Freq: EVERY 4 HOURS PRN Route: PO  PRN Reason: mild pain  Start: 12/21/17 2103   Admin Instructions: Alternate ibuprofen (if ordered) with acetaminophen.  Maximum acetaminophen dose from all sources = 75 mg/kg/day not to exceed 4 grams/day.               acetaminophen (TYLENOL) tablet 975 mg  Dose: 975 mg Freq: AT BEDTIME Route: PO  Start: 12/21/17 2200   Admin Instructions: Maximum acetaminophen dose from all sources = 75 mg/kg/day not to exceed 4 grams/day.          2151 (975 mg)-Given        [ ] 2200           allopurinol (ZYLOPRIM) tablet 300 mg  Dose: 300 mg Freq: DAILY Route: PO  Start: 12/22/17 0900          0924 (300 mg)-Given           aspirin chewable tablet 81 mg  Dose: 81 mg Freq: DAILY Route: PO  Start: 12/22/17 0900          0924 (81 mg)-Given           bisacodyl (DULCOLAX) Suppository 10 mg  Dose: 10 mg Freq: DAILY PRN Route: RE  PRN Reason: constipation  Start: 12/21/17 2104   Admin Instructions: Hold for loose stools.  This is the third step of a three step constipation treatment.               captopril (CAPOTEN) tablet 50 mg  Dose: 50 mg Freq: 3 TIMES DAILY BEFORE MEALS Route: PO  Start: 12/22/17 1130   Admin Instructions: Take on empty stomach- 1 hour before meals.           1217 (50  mg)-Given       [ ] 1630           docusate sodium (COLACE) capsule 100 mg  Dose: 100 mg Freq: 2 TIMES DAILY Route: PO  Start: 12/21/17 2100 2151 (100 mg)-Given        0923 (100 mg)-Given       [ ] 2100           glucose 40 % gel 15-30 g  Dose: 15-30 g Freq: EVERY 15 MIN PRN Route: PO  PRN Reason: low blood sugar  Start: 12/21/17 2059   Admin Instructions: Give 15 g for BG 51 to 69 mg/dL IF patient is conscious and able to swallow. Give 30 g for BG less than or equal to 50 mg/dL IF patient is conscious and able to swallow. Do NOT give glucose gel via enteral tube.  IF patient has enteral tube: give apple juice 120 mL (4 oz or 15 g of CHO) via enteral tube for BG 51 to 69 mg/dL.  Give apple juice 240 mL (8 oz or 30 g of CHO) via enteral tube for BG less than or equal to 50 mg/dL.    ~Oral gel is preferable for conscious and able to swallow patient.   ~IF gel unavailable or patient refuses may provide apple juice 120 mL (4 oz or 15 g of CHO). Document juice on I and O flowsheet.              Or  dextrose 50 % injection 25-50 mL  Dose: 25-50 mL Freq: EVERY 15 MIN PRN Route: IV  PRN Reason: low blood sugar  Start: 12/21/17 2059   Admin Instructions: Use if have IV access, BG less than 70 mg/dL and meet dose criteria below:  Dose if conscious and alert (or disorientated) and NPO = 25 mL  Dose if unconscious / not alert = 50 mL  Vesicant. For ordered doses up to 25 mg, give IV Push undiluted. Give each 5g over 1 minute.              Or  glucagon injection 1 mg  Dose: 1 mg Freq: EVERY 15 MIN PRN Route: SC  PRN Reason: low blood sugar  PRN Comment: May repeat x 1 only  Start: 12/21/17 2059   Admin Instructions: May give SQ or IM. ONLY use glucagon IF patient has NO IV access AND is UNABLE to swallow AND blood glucose is LESS than or EQUAL to 50 mg/dL.  Give IV Push over 1 minute. Reconstitute with 1mL sterile water.               insulin aspart (NovoLOG) inj (RAPID ACTING)  Dose: 1-5 Units Freq: AT BEDTIME Route:  SC  Start: 12/21/17 2200   Admin Instructions: MEDIUM INSULIN RESISTANCE DOSING    Do Not give Bedtime Correction Insulin if BG less than  200.   For  - 249 give 1 units.   For  - 299 give 2 units.   For  - 349 give 3 units.   For  -399 give 4 units.   For BG greater than or equal to 400 give 5 units.  Notify provider if glucose greater than or equal to 350 mg/dL after administration of correction dose.  If given at mealtime, must be administered 5 min before meal or immediately after.          (2235)-Not Given [C]        [ ] 2200           insulin aspart (NovoLOG) inj (RAPID ACTING)  Dose: 1-7 Units Freq: 3 TIMES DAILY BEFORE MEALS Route: SC  Start: 12/22/17 0730   Admin Instructions: Correction Scale - MEDIUM INSULIN RESISTANCE DOSING     Do Not give Correction Insulin if Pre-Meal BG less than 140.   For Pre-Meal  - 189 give 1 unit.   For Pre-Meal  - 239 give 2 units.   For Pre-Meal  - 289 give 3 units.   For Pre-Meal  - 339 give 4 units.   For Pre-Meal - 399 give 5 units.   For Pre-Meal -449 give 6 units  For Pre-Meal BG greater than or equal to 450 give 7 units.   To be given with prandial insulin, and based on pre-meal blood glucose.    Notify provider if glucose greater than or equal to 350 mg/dL after administration of correction dose.  If given at mealtime, must be administered 5 min before meal or immediately after.           (0929)-Not Given       (1307)-Not Given       [ ] 1630           levETIRAcetam (KEPPRA) tablet 1,000 mg  Dose: 1,000 mg Freq: 2 TIMES DAILY Route: PO  Start: 12/21/17 2100         2151 (1,000 mg)-Given        0923 (1,000 mg)-Given       [ ] 2100           magnesium hydroxide (MILK OF MAGNESIA) suspension 30 mL  Dose: 30 mL Freq: DAILY PRN Route: PO  PRN Reason: constipation  Start: 12/21/17 2104   Admin Instructions: Hold for loose stools.  This is the second step of a three step constipation treatment.               metFORMIN  (GLUCOPHAGE) tablet 1,000 mg  Dose: 1,000 mg Freq: DAILY WITH SUPPER Route: PO  Start: 12/21/17 2100   Admin Instructions: If the patient receives intravenous, iodinated contrast and patient GFR is greater than 60 mL/min/1.7m2, continue metformin.  Contact provider for 'hold' or 'no hold' instructions if no GFR or if GFR is less than 60 mL/min/1.7m2.          (2235)-Not Given        [ ] 1700           metoprolol (LOPRESSOR) tablet 25 mg  Dose: 25 mg Freq: 2 TIMES DAILY Route: PO  Start: 12/21/17 2100 2151 (25 mg)-Given        0925 (25 mg)-Given       [ ] 2100           miconazole (MICATIN; MICRO GUARD) 2 % powder  Freq: 2 TIMES DAILY Route: Top  Start: 12/21/17 2100   Admin Instructions: Apply to skin folds          (2235)-Not Given        (0933)-Not Given [C]       1115 ( )-Given       [ ] 2100           naloxone (NARCAN) injection 0.1-0.4 mg  Dose: 0.1-0.4 mg Freq: EVERY 2 MIN PRN Route: IV  PRN Reason: opioid reversal  Start: 12/21/17 2103   Admin Instructions: For respiratory rate LESS than or EQUAL to 8.  Partial reversal dose:  0.1 mg titrated q 2 minutes for Analgesia Side Effects Monitoring Sedation Level of 3 (frequently drowsy, arousable, drifts to sleep during conversation).Full reversal dose:  0.4 mg bolus for Analgesia Side Effects Monitoring Sedation Level of 4 (somnolent, minimal or no response to stimulation).  For ordered doses up to 2mg give IVP. Give each 0.4mg over 15 seconds in emergency situations. For non-emergent situations further dilute in 9mL of NS to facilitate titration of response.               ondansetron (ZOFRAN-ODT) ODT tab 4 mg  Dose: 4 mg Freq: EVERY 6 HOURS PRN Route: PO  PRN Reasons: nausea,vomiting  Start: 12/21/17 2103   Admin Instructions: This is Step 1 of nausea and vomiting management.  If nausea not resolved in 15 minutes, go to Step 2 prochlorperazine (COMPAZINE). Do not push through foil backing. Peel back foil and gently remove. Place on tongue immediately.  Administration with liquid unnecessary              Or  ondansetron (ZOFRAN) injection 4 mg  Dose: 4 mg Freq: EVERY 6 HOURS PRN Route: IV  PRN Reasons: nausea,vomiting  Start: 12/21/17 2103   Admin Instructions: This is Step 1 of nausea and vomiting management.  If nausea not resolved in 15 minutes, go to Step 2 prochlorperazine (COMPAZINE).  Irritant. For ordered doses up to 4 mg, give IV Push undiluted over 2-5 minutes.               polyethylene glycol (MIRALAX/GLYCOLAX) Packet 17 g  Dose: 17 g Freq: DAILY Route: PO  Start: 12/22/17 0900   Admin Instructions: 1 Packet = 17 grams. Mixed prescribed dose in 8 ounces of water.  1 Packet = 17 grams. Mixed prescribed dose in 8 ounces of water. Follow with 8 oz. of water.           0923 (17 g)-Given           sertraline (ZOLOFT) tablet 100 mg  Dose: 100 mg Freq: DAILY Route: PO  Start: 12/22/17 0900          0924 (100 mg)-Given           simvastatin (ZOCOR) tablet 10 mg  Dose: 10 mg Freq: AT BEDTIME Route: PO  Start: 12/21/17 2200         2151 (10 mg)-Given        [ ] 2200           terazosin (HYTRIN) capsule 10 mg  Dose: 10 mg Freq: AT BEDTIME Route: PO  Start: 12/21/17 2200         2152 (10 mg)-Given        [ ] 2200           traMADol (ULTRAM) tablet 50 mg  Dose: 50 mg Freq: EVERY 8 HOURS PRN Route: PO  PRN Reason: moderate pain  Start: 12/21/17 2054 2151 (50 mg)-Given           Future Medications  Medications 12/16/17 12/17/17 12/18/17 12/19/17 12/20/17 12/21/17 12/22/17       insulin glargine (LANTUS) injection 10 Units  Dose: 10 Units Freq: AT BEDTIME Route: SC  Start: 12/22/17 2200          [ ] 2200          Discontinued Medications  Medications 12/16/17 12/17/17 12/18/17 12/19/17 12/20/17 12/21/17 12/22/17         Dose: 50 mg Freq: 3 TIMES DAILY Route: PO  Indications of Use: HYPERTENSION  Start: 12/22/17 1400   End: 12/22/17 1223          1223-Med Discontinued         Dose: 50 mg Freq: 3 TIMES DAILY BEFORE MEALS Route: PO  Start: 12/21/17 2100   End:  12/22/17 1102   Admin Instructions: Take on empty stomach- 1 hour before meals.          2153 (50 mg)-Given        0651 (50 mg)-Given       1102-Med Discontinued         Dose: 15-30 g Freq: EVERY 15 MIN PRN Route: PO  PRN Reason: low blood sugar  Start: 12/21/17 2105   End: 12/22/17 0757   Admin Instructions: Give 15 g for BG 51 to 69 mg/dL IF patient is conscious and able to swallow. Give 30 g for BG less than or equal to 50 mg/dL IF patient is conscious and able to swallow. Do NOT give glucose gel via enteral tube.  IF patient has enteral tube: give apple juice 120 mL (4 oz or 15 g of CHO) via enteral tube for BG 51 to 69 mg/dL.  Give apple juice 240 mL (8 oz or 30 g of CHO) via enteral tube for BG less than or equal to 50 mg/dL.    ~Oral gel is preferable for conscious and able to swallow patient.   ~IF gel unavailable or patient refuses may provide apple juice 120 mL (4 oz or 15 g of CHO). Document juice on I and O flowsheet.           0757-Med Discontinued      Or    Dose: 25-50 mL Freq: EVERY 15 MIN PRN Route: IV  PRN Reason: low blood sugar  Start: 12/21/17 2105   End: 12/22/17 0757   Admin Instructions: Use if have IV access, BG less than 70 mg/dL and meet dose criteria below:  Dose if conscious and alert (or disorientated) and NPO = 25 mL  Dose if unconscious / not alert = 50 mL  Vesicant. For ordered doses up to 25 mg, give IV Push undiluted. Give each 5g over 1 minute.           0757-Med Discontinued      Or    Dose: 1 mg Freq: EVERY 15 MIN PRN Route: SC  PRN Reason: low blood sugar  PRN Comment: May repeat x 1 only  Start: 12/21/17 2105   End: 12/22/17 0757   Admin Instructions: May give SQ or IM. ONLY use glucagon IF patient has NO IV access AND is UNABLE to swallow AND blood glucose is LESS than or EQUAL to 50 mg/dL.  Give IV Push over 1 minute. Reconstitute with 1mL sterile water.           0757-Med Discontinued         Dose: 10 Units Freq: AT BEDTIME Route: SC  Start: 12/21/17 2200   End: 12/22/17  1224 2205 (10 Units)-Given        1224-Med Discontinued         Dose: 18 Units Freq: AT BEDTIME Route: SC  Start: 12/22/17 2200   End: 12/22/17 1224          1224-Med Discontinued         Dose: 0.1-0.4 mg Freq: EVERY 2 MIN PRN Route: IV  PRN Reason: opioid reversal  Start: 12/21/17 2102   End: 12/21/17 2109   Admin Instructions: For respiratory rate LESS than or EQUAL to 8.  Partial reversal dose:  0.1 mg titrated q 2 minutes for Analgesia Side Effects Monitoring Sedation Level of 3 (frequently drowsy, arousable, drifts to sleep during conversation).Full reversal dose:  0.4 mg bolus for Analgesia Side Effects Monitoring Sedation Level of 4 (somnolent, minimal or no response to stimulation).  For ordered doses up to 2mg give IVP. Give each 0.4mg over 15 seconds in emergency situations. For non-emergent situations further dilute in 9mL of NS to facilitate titration of response.          2109-Med Discontinued     Medications 12/16/17 12/17/17 12/18/17 12/19/17 12/20/17 12/21/17 12/22/17

## 2017-12-21 NOTE — IP AVS SNAPSHOT
MRN:[id number]                      After Visit Summary   12/21/2017    Trey Washington    MRN: [id number]           Thank you!     Thank you for choosing [facility] for your care. Our goal is always to provide you with excellent care. Hearing back from our patients is one way we can continue to improve our services. Please take a few minutes to complete the written survey that you may receive in the mail after you visit with us. Thank you!        Patient Information     Date Of Birth          1935        Designated Caregiver       Most Recent Value    Caregiver    Will someone help with your care after discharge? yes    Name of designated caregiver Adri Willett    Phone number of caregiver [phone]    Caregiver address [address] [place] [address]      About your hospital stay     You were admitted on:  December 21, 2017 You last received care in the:  [facility]    You were discharged on:  December 22, 2017        Reason for your hospital stay       Left knee pain after fall,progressive weakness                  Who to Call     For medical emergencies, please call 911.  For non-urgent questions about your medical care, please call your primary care provider or clinic, [phone]          Attending Provider     Provider Specialty    Judy Sparrow MD Family Practice    VandaLamonte wynn MD Internal Medicine    Veronica Hood, NP Nurse Practitioner       Primary Care Provider Office Phone # Fax #    Moreno Desai [phone]      After Care Instructions     Activity - Up with nursing assistance           Additional Discharge Instructions       CPAP at night with home settings: Pressure 11, EPR 1, 0 ramp time            Advance Diet as Tolerated       Follow this diet upon discharge: Orders Placed This Encounter      Moderate Consistent CHO Diet            Fall precautions           General info for SNF       Length of Stay Estimate: Short Term Care:  "Estimated # of Days <30  Condition at Discharge: Stable  Level of care:skilled   Rehabilitation Potential: Fair  Admission H&P remains valid and up-to-date: Yes    Use Nursing Home Standing Orders: Yes            Glucose monitor nursing POCT       Before meals and at bedtime                  Additional Services     Occupational Therapy Adult Consult       Evaluate and treat as clinically indicated.    Reason:  Eval and treat            Physical Therapy Adult Consult       Evaluate and treat as clinically indicated.    Reason:  eval and treat                  Pending Results     Date and Time Order Name Status Description    12/21/2017 2026 Active MRSA Surveillance Culture Preliminary             Statement of Approval     Ordered          12/22/17 1320  I have reviewed and agree with all the recommendations and orders detailed in this document.  EFFECTIVE NOW     Approved and electronically signed by:  Veronica Hood NP             Admission Information     Date & Time Provider Department Dept. Phone    12/21/2017 Veronica Hood NP [facility] [phone]      Your Vitals Were     Blood Pressure Pulse Temperature Respirations Height Weight    184/101 68 96.9  F (36.1  C) (Tympanic) 16 1.778 m (5' 10\") 164.1 kg (361 lb 12.4 oz)    Pulse Oximetry BMI (Body Mass Index)                100% 51.91 kg/m2          MyChart Information     [employer] [facility] gives you secure access to your electronic health record. If you see a primary care provider, you can also send messages to your care team and make appointments. If you have questions, please call your primary care clinic.  If you do not have a primary care provider, please call [phone] and they will assist you.        Care EveryWhere ID     This is your Care EveryWhere ID. This could be used by other organizations to access your [facility] medical records  [id number]        Equal Access to Services     WISAM SANTIAGO AH: cydney Gregory " delanotatiana, sonali macias, de mancuso ah. So [facility] [phone].    ATENCIÓN: Si bailey bains, tiene a cabrera disposición servicios gratuitos de asistencia lingüística. Llame al [phone].    We comply with applicable federal civil rights laws and [place] laws. We do not discriminate on the basis of race, color, national origin, age, disability, sex, sexual orientation, or gender identity.               Review of your medicines      CONTINUE these medicines which may have CHANGED, or have new prescriptions. If we are uncertain of the size of tablets/capsules you have at home, strength may be listed as something that might have changed.        Dose / Directions    * insulin aspart 100 UNIT/ML injection   Commonly known as:  NovoLOG FLEXPEN   This may have changed:  See the new instructions.   Used for:  Type 2 diabetes mellitus with diabetic nephropathy, with long-term current use of insulin (H)        INJECT 8 UNITS SUBCUTANEOUS with lunch   Quantity:  9 mL   Refills:  0       * insulin aspart 100 UNIT/ML injection   Commonly known as:  NovoLOG PEN   This may have changed:  You were already taking a medication with the same name, and this prescription was added. Make sure you understand how and when to take each.   Used for:  Type 2 diabetes mellitus with diabetic nephropathy, with long-term current use of insulin (H)        Dose:  1-7 Units   Inject 1-7 Units Subcutaneous 3 times daily (before meals) Do Not give Correction Insulin if Pre-Meal BG less than 140.  For Pre-Meal  - 189=1 unit.  For Pre-Meal  - 239=2 units.  For Pre-Meal  - 289=3 units.  For Pre-Meal  - 339=4 units.  For Pre-Meal - 399=5 units.  For Pre-Meal -449=6 units For Pre-Meal BG >=450 give 7 units.  To be given with prandial insulin, and based on pre-meal blood glucose.   Refills:  0       insulin glargine 100 UNIT/ML injection   Commonly known as:  LANTUS SOLOSTAR   This  "may have changed:  See the new instructions.   Used for:  Type 2 diabetes mellitus with diabetic nephropathy, with long-term current use of insulin (H)        Dose:  10 Units   Inject 10 Units Subcutaneous At Bedtime   Refills:  0       * Notice:  This list has 2 medication(s) that are the same as other medications prescribed for you. Read the directions carefully, and ask your doctor or other care provider to review them with you.      CONTINUE these medicines which have NOT CHANGED        Dose / Directions    allopurinol 300 MG tablet   Commonly known as:  ZYLOPRIM   Used for:  Idiopathic gout, unspecified chronicity, unspecified site        Dose:  1 tablet   Take 1 tablet (300 mg) by mouth daily   Quantity:  90 tablet   Refills:  3       AQUACEL-AG EXTRA HYDROFIBER 4\"X5\" Pads   Used for:  Open wound of abdomen        Dose:  0.25 each   Externally apply 0.25 each topically every other day Use as directed by wound center   Quantity:  5 each   Refills:  3       aspirin 81 MG tablet        Dose:  1 tablet   Take 1 tablet by mouth daily   Refills:  0       blood glucose monitoring lancets   Used for:  IDDM (insulin dependent diabetes mellitus) (H)        USE AS DIRECTED TESTING BLOOD SUGAR TWICE DAILY OR AS DIRECTED   Quantity:  200 each   Refills:  1       blood glucose monitoring meter device kit   Used for:  IDDM (insulin dependent diabetes mellitus) (H)        Use to test blood sugars 2 times daily or as directed.   Quantity:  1 kit   Refills:  0       blood glucose monitoring test strip   Commonly known as:  KEO CONTOUR   Used for:  IDDM (insulin dependent diabetes mellitus) (H)        Use to test blood sugars 2 times daily or as directed.   Quantity:  200 each   Refills:  3       CAPOTEN PO   Indication:  High Blood Pressure Disorder        Dose:  50 mg   Take 50 mg by mouth 3 times daily   Refills:  0       cyanocolbalamin 500 MCG tablet   Commonly known as:  vitamin  B-12   Used for:  Vitamin deficiency     "    TAKE 2 TABLETS (1,000 MCG) BY MOUTH DAILY   Quantity:  180 tablet   Refills:  1       DOCQLACE 100 MG capsule   Used for:  Constipation   Generic drug:  docusate sodium        TAKE 1 CAPSULE BY MOUTH TWICE DAILY   Quantity:  100 capsule   Refills:  1       fish oil-omega-3 fatty acids 1000 MG capsule        Dose:  1000 mg   Take 1,000 mg by mouth daily   Refills:  0       ketoconazole 2 % cream   Commonly known as:  NIZORAL        Apply topically 2 times daily as needed   Refills:  0       levETIRAcetam 1000 MG Tabs   Used for:  Seizure disorder (H)        TAKE 1 TABLET BY MOUTH TWICE DAILY   Quantity:  60 tablet   Refills:  5       metFORMIN 1000 MG tablet   Commonly known as:  GLUCOPHAGE   Used for:  Diabetes mellitus, type 2 (H)        TAKE 1 TABLET BY MOUTH EVERY DAY WITH A MEAL   Quantity:  90 tablet   Refills:  1       miconazole 2 % powder   Commonly known as:  MICATIN; MICRO GUARD        Apply topically 2 times daily as needed   Refills:  0       NOVOFINE 30G X 8 MM   Used for:  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, not stated as uncontrolled(250.11)   Generic drug:  insulin pen needle        USE 4 PENS NEEDLES DAILY OR AS DIRECTED   Quantity:  100 each   Refills:  6       * order for DME   Used for:  Hypoxia        Equipment being ordered: Oxygen 2 LPM per nasal cannula during the day, portable also   Quantity:  1 each   Refills:  11       * order for DME   Used for:  Morbid obesity, unspecified obesity type (H)        Equipment being ordered: Wheelchair   Quantity:  1 each   Refills:  0       * order for DME   Used for:  Skin ulcer, limited to breakdown of skin (H)        Equipment being ordered: Meplilex Transfer 6 X 8 inch (192780) - disp 3;  Aquacel AG 4 X 5 ([id number]); disp 3 - Refills 6   Quantity:  1 each   Refills:  6       order for DME   Used for:  Ulcer of skin (H)        4x4 bulk SOFT gauze (disp 1 sleeve, refill x 5)   Quantity:  1 Units   Refills:  0       Oyster Shell  Calcium 500 MG tablet   Commonly known as:  OS-Morris 500 mg [place]. Ca   Used for:  HTN (hypertension)        TAKE 1 TABLET BY MOUTH TWICE DAILY   Quantity:  180 tablet   Refills:  2       polyethylene glycol powder   Commonly known as:  MIRALAX/GLYCOLAX   Used for:  Constipation        TAKE 17 GRAMS BY MOUTH DAILY MIXED AS DIRECTED.   Quantity:  527 g   Refills:  8       sertraline 100 MG tablet   Commonly known as:  ZOLOFT   Used for:  Dysthymia        TAKE 1 TABLET BY MOUTH ONCE DAILY   Quantity:  90 tablet   Refills:  3       simvastatin 10 MG tablet   Commonly known as:  ZOCOR   Used for:  Hypercholesterolemia        TAKE 1 TABLET BY MOUTH NIGHTLY AT BEDTIME   Quantity:  90 tablet   Refills:  0       T.E.D. BELOW KNEE/L-REGULAR Misc   Used for:  Swelling of limb        Apply in am and take off in the evening   Quantity:  6 each   Refills:  11       terazosin 10 MG capsule   Commonly known as:  HYTRIN   Used for:  Essential hypertension        TAKE 1 CAPSULE BY MOUTH NIGHTLY AT BEDTIME   Quantity:  90 capsule   Refills:  2       traMADol 50 MG tablet   Commonly known as:  ULTRAM   Used for:  Type 2 diabetes mellitus with diabetic nephropathy, with long-term current use of insulin (H), Chronic pain syndrome, Osteoarthrosis involving lower leg        Dose:  50 mg   Take 1 tablet (50 mg) by mouth every 8 hours as needed for moderate pain   Quantity:  30 tablet   Refills:  3       TYLENOL PO        Dose:  1000 mg   Take 1,000 mg by mouth At Bedtime   Refills:  0       UNABLE TO FIND        CPAP   Refills:  0       UNISTIK 3 NORMAL Misc   Used for:  Diabetes mellitus, type 2 (H)        USE TO TEST BLOOD SUGARS TWO TIMES A DAY   Quantity:  100 each   Refills:  3       VITAMIN C PO        Dose:  500 mg   Take 500 mg by mouth daily   Refills:  0       VITAMIN D HIGH POTENCY 1000 UNITS Caps   Used for:  Vitamin D deficiency        TAKE 1 CAPSULE BY MOUTH DAILY   Quantity:  90 capsule   Refills:  3       vitamin E 100 UNIT  "capsule   Commonly known as:  TOCOPHEROL        Dose:  100 Units   Take 100 Units by mouth daily Uncertain of dose   Refills:  0       * Notice:  This list has 3 medication(s) that are the same as other medications prescribed for you. Read the directions carefully, and ask your doctor or other care provider to review them with you.         Where to get your medicines      Some of these will need a paper prescription and others can be bought over the counter. Ask your nurse if you have questions.     Bring a paper prescription for each of these medications     traMADol 50 MG tablet       You don't need a prescription for these medications     insulin aspart 100 UNIT/ML injection    insulin aspart 100 UNIT/ML injection    insulin glargine 100 UNIT/ML injection                Protect others around you: Learn how to safely use, store and throw away your medicines at www.disposemymeds.org.             Medication List: This is a list of all your medications and when to take them. Check marks below indicate your daily home schedule. Keep this list as a reference.      Medications           Morning Afternoon Evening Bedtime As Needed    allopurinol 300 MG tablet   Commonly known as:  ZYLOPRIM   Take 1 tablet (300 mg) by mouth daily   Last time this was given:  300 mg on 12/22/2017  9:24 AM                                AQUACEL-AG EXTRA HYDROFIBER 4\"X5\" Pads   Externally apply 0.25 each topically every other day Use as directed by wound center                                aspirin 81 MG tablet   Take 1 tablet by mouth daily                                blood glucose monitoring lancets   USE AS DIRECTED TESTING BLOOD SUGAR TWICE DAILY OR AS DIRECTED                                blood glucose monitoring meter device kit   Use to test blood sugars 2 times daily or as directed.                                blood glucose monitoring test strip   Commonly known as:  [employer] CONTOUR   Use to test blood sugars 2 times daily or " as directed.                                CAPOTEN PO   Take 50 mg by mouth 3 times daily   Last time this was given:  50 mg on 12/22/2017 12:17 PM                                cyanocolbalamin 500 MCG tablet   Commonly known as:  vitamin  B-12   TAKE 2 TABLETS (1,000 MCG) BY MOUTH DAILY                                DOCQLACE 100 MG capsule   TAKE 1 CAPSULE BY MOUTH TWICE DAILY   Last time this was given:  100 mg on 12/22/2017  9:23 AM   Generic drug:  docusate sodium                                fish oil-omega-3 fatty acids 1000 MG capsule   Take 1,000 mg by mouth daily                                * insulin aspart 100 UNIT/ML injection   Commonly known as:  NovoLOG FLEXPEN   INJECT 8 UNITS SUBCUTANEOUS with lunch                                * insulin aspart 100 UNIT/ML injection   Commonly known as:  NovoLOG PEN   Inject 1-7 Units Subcutaneous 3 times daily (before meals) Do Not give Correction Insulin if Pre-Meal BG less than 140.  For Pre-Meal  - 189=1 unit.  For Pre-Meal  - 239=2 units.  For Pre-Meal  - 289=3 units.  For Pre-Meal  - 339=4 units.  For Pre-Meal - 399=5 units.  For Pre-Meal -449=6 units For Pre-Meal BG >=450 give 7 units.  To be given with prandial insulin, and based on pre-meal blood glucose.                                insulin glargine 100 UNIT/ML injection   Commonly known as:  LANTUS SOLOSTAR   Inject 10 Units Subcutaneous At Bedtime   Last time this was given:  10 Units on 12/21/2017 10:05 PM                                ketoconazole 2 % cream   Commonly known as:  NIZORAL   Apply topically 2 times daily as needed                                levETIRAcetam 1000 MG Tabs   TAKE 1 TABLET BY MOUTH TWICE DAILY   Last time this was given:  1,000 mg on 12/22/2017  9:23 AM                                metFORMIN 1000 MG tablet   Commonly known as:  GLUCOPHAGE   TAKE 1 TABLET BY MOUTH EVERY DAY WITH A MEAL                                miconazole  2 % powder   Commonly known as:  MICATIN; MICRO GUARD   Apply topically 2 times daily as needed   Last time this was given:  12/22/2017 11:15 AM                                NOVOFINE 30G X 8 MM   USE 4 PENS NEEDLES DAILY OR AS DIRECTED   Generic drug:  insulin pen needle                                * order for DME   Equipment being ordered: Oxygen 2 LPM per nasal cannula during the day, portable also                                * order for DME   Equipment being ordered: Wheelchair                                * order for DME   Equipment being ordered: Meplilex Transfer 6 X 8 inch ([id number]) - disp 3;  Aquacel AG 4 X 5 ([id number]); disp 3 - Refills 6                                order for DME   4x4 bulk SOFT gauze (disp 1 sleeve, refill x 5)                                Oyster Shell Calcium 500 MG tablet   Commonly known as:  OS-Morris 500 mg [place]. Ca   TAKE 1 TABLET BY MOUTH TWICE DAILY                                polyethylene glycol powder   Commonly known as:  MIRALAX/GLYCOLAX   TAKE 17 GRAMS BY MOUTH DAILY MIXED AS DIRECTED.                                sertraline 100 MG tablet   Commonly known as:  ZOLOFT   TAKE 1 TABLET BY MOUTH ONCE DAILY   Last time this was given:  100 mg on 12/22/2017  9:24 AM                                simvastatin 10 MG tablet   Commonly known as:  ZOCOR   TAKE 1 TABLET BY MOUTH NIGHTLY AT BEDTIME   Last time this was given:  10 mg on 12/21/2017  9:51 PM                                T.E.D. BELOW KNEE/L-REGULAR Misc   Apply in am and take off in the evening                                terazosin 10 MG capsule   Commonly known as:  HYTRIN   TAKE 1 CAPSULE BY MOUTH NIGHTLY AT BEDTIME   Last time this was given:  10 mg on 12/21/2017  9:52 PM                                traMADol 50 MG tablet   Commonly known as:  ULTRAM   Take 1 tablet (50 mg) by mouth every 8 hours as needed for moderate pain   Last time this was given:  50 mg on 12/21/2017  9:51 PM                                 TYLENOL PO   Take 1,000 mg by mouth At Bedtime   Last time this was given:  975 mg on 12/21/2017  9:51 PM                                UNABLE TO FIND   CPAP                                UNISTIK 3 NORMAL Misc   USE TO TEST BLOOD SUGARS TWO TIMES A DAY                                VITAMIN C PO   Take 500 mg by mouth daily                                VITAMIN D HIGH POTENCY 1000 UNITS Caps   TAKE 1 CAPSULE BY MOUTH DAILY                                vitamin E 100 UNIT capsule   Commonly known as:  TOCOPHEROL   Take 100 Units by mouth daily Uncertain of dose                                * Notice:  This list has 5 medication(s) that are the same as other medications prescribed for you. Read the directions carefully, and ask your doctor or other care provider to review them with you.              More Information        Weakness (Uncertain Cause)  Based on your exam today, the exact cause of your weakness is not certain. However, your weakness does not seem to be a sign of a serious illness at this time. Keep an eye on your symptoms and get medical advice as instructed below.  Home care    Rest at home today. Do not over-exert yourself.    Take any medicine as prescribed.    For the next few days, drink extra fluids (unless your healthcare provider wants you to restrict fluids for other reasons). Do not skip meals.  Follow-up care  Follow up with your healthcare provider or as advised.  When to seek medical advice  Call your healthcare provider for any of the following    Worsening of your symptoms    Symptoms don't start getting better within 2 days    Fever of 100.4  F (38  C) or higher, or as directed by your healthcare provider     Call 911  Get emergency medical care for any of these:    Chest, arm, neck, jaw or upper back pain    Trouble breathing    Numbness or weakness of the face, one arm or one leg    Slurred speech, confusion, trouble speaking, walking or seeing    Blood in vomit or stool  (black or red color)    Loss of consciousness  Date Last Reviewed: 6/10/2015    4864-0110 [employer]. [address] [place] [address], [place] [address]. All rights reserved. This information is not intended as a substitute for professional medical care. Always follow your healthcare professional's instructions.                Uncertain Causes of Fall  You have had a fall today. But the cause of your fall is not certain. Falls can occur due to slipping, tripping or losing your balance. A fall can also occur from a fainting spell or seizure.  While a fall can happen for a simple reason (tripping over something), falls in elderly people are often caused by a combination of things:    Age-related decline in function with worsening balance, stability, vision, and muscle strength    Chronic illness such as heart arrhythmias, heart valve disease, vascular disease, COPD, diabetes, strokes, arthritis    Effects or side effects of medicines    Dehydration.    Environmental hazards such as uneven or slippery ground, unfamiliar place, obstacles, uneven surfaces, or slippery ground    Situational factors (related to the activity being done, e.g., rushing to the bathroom)  Because the cause of your fall today is not certain, it is possible that a fainting spell or seizure was the cause. This means that it could happen again, without warning. If you fall again, without a cause, then you should return to this facility promptly to have further tests. Otherwise, follow up with your doctor as explained below.  It is normal to feel sore and tight in your muscles and back the next day, and not just the muscles you initially injured. Remember, all the parts of your body are connected, so while initially one area hurts, the next day another may hurt. Also, when you injure yourself, it causes inflammation, which then causes the muscles to tighten up and hurt more. After the initial worsening, it should gradually improve over the  next few days. However, more severe pain should be reported.  Even without a definite head injury, you can still get a concussion. Concussions and even bleeding can still occur, especially if you have had a recent injury or take blood thinner medicine. It is not unusual to have a mild headache and feel tired and even nauseous or dizzy.  Home care    Rest today and resume your normal activities as soon as you are feeling back to normal. It is best to remain with someone who can check on you for the next 24 hours to watch for another episode of falling.    If you were injured during the fall, follow the advice from your doctor regarding care of your injury.     If you become light-headed or dizzy, lie down immediately or sit and lean forward with your head down.    As a precaution, do not drive a car or operate dangerous equipment, do not take a bath alone (use a shower instead) and do not swim alone until you see your doctor. A condition causing fainting or seizures must be ruled out before resuming these activities.    You may use acetaminophen or ibuprofen to control pain, unless another pain medicine was prescribed. If you have chronic liver or kidney disease or ever had a stomach ulcer or gastrointestinal bleeding, talk with your doctor before using these medicines.    Keep your appointments for any further testing that may have been scheduled for you.  Follow-up care  Follow up with your healthcare provider, or as advised.  If X-rays or CT scan were done, you will be notified if there is a change in the reading, especially if it affects treatment.  Call 911  Call 911 if any of these occur:    Trouble breathing    Confused or difficulty arousing    Fainting or loss of consciousness    Rapid or very slow heart rate    Seizure    Difficulty with speech or vision, weakness of an arm or leg    Difficulty walking or talking, loss of balance, numbness or weakness in one side of your body, facial droop  When to seek  medical advice  Call your healthcare provider right away if any of these occur:    Another unexplained fall    Dizziness    Severe headache    Nausea and vomiting    Blood in vomit, stools (black or red color)  Date Last Reviewed: 11/5/2015 2000-2017 [employer]. [address] [place] [address], [place] [address]. All rights reserved. This information is not intended as a substitute for professional medical care. Always follow your healthcare professional's instructions.                Knee Pain  Knee pain is very common. It s especially common in active people who put a lot of pressure on their knees, like runners. It affects women more often than men.  Your kneecap (patella) is a thick, round bone. It covers and protects the front portion of your knee joint. It moves along a groove in your thighbone (femur) as part of the patellofemoral joint. A layer of cartilage surrounds the underside of your kneecap. This layer protects it from grinding against your femur.  When this cartilage softens and breaks down, it can cause knee pain. This is partly because of repetitive stress. The stress irritates the lining of the joint. This causes pain in the underlying bone.  What causes knee pain?  Many things can cause knee pain. You may have more than one cause. Some of these include:    Overuse of the knee joint    The kneecap doesn t line up with the tissue around it    Damage to small nerves in the area    Damage to the ligament-like structure that holds the kneecap in place (retinaculum)    Breakdown of the bone under the cartilage    Swelling in the soft tissues around the kneecap    Injury  You might be more likely to have knee pain if you:    Exercise a lot    Recently increased the intensity of your workouts    Have a body mass index (BMI) greater than 25    Have poor alignment of your kneecap    Walk with your feet turned overly outward or inward    Have weakness in surrounding muscle groups (inner quad or hip  adductor muscles)    Have too much tightness in surrounding muscle groups (hamstrings or iliotibial band)    Have a recent history of injury to the area    Are female  Symptoms of knee pain  This type of knee pain is a dull, aching pain in the front of the knee in the area under and around the kneecap. This pain may start quickly or slowly. Your pain might be worse when you squat, run, or sit for a long time. You might also sometimes feel like your knee is giving out. You may have symptoms in one or both of your knees.  Diagnosing knee pain  Your healthcare provider will ask about your medical history and your symptoms. Be sure to describe any activities that make your knee pain worse. He or she will look at your knee. This will include tests of your range of motion, strength, and areas of pain of your knee. Your knee alignment will be checked.  Your healthcare provider will need to rule out other causes of your knee pain, such as arthritis. You may need an imaging test, such as an X-ray or MRI.  Treatment for knee pain  Treatments that can help ease your symptoms may include:    Avoiding activities for a while that make your pain worse, returning to activity over time    Icing the outside of your knee when it causes you pain    Taking over-the-counter pain medicine    Wearing a knee brace or taping your knee to support it    Wearing special shoe inserts to help keep your feet in the proper alignment    Doing special exercises to stretch and strengthen the muscles around your hip and your knee  These steps help most people manage knee pain. But some cases of knee pain need to be treated with surgery. You may need surgery right away. Or you may need it later if other treatments don t work. Your healthcare provider may refer you to an orthopedic surgeon. He or she will talk with you about your choices.  Preventing knee pain  Losing weight and correcting excess muscle tightness or muscle weakness may help lower your  risk.  In some cases, you can prevent knee pain. To help prevent a flare-up of knee pain, you do these things:    Regularly do all the exercises your doctor or physical therapist advises    Support your knee as advised by your doctor or physical therapist    Increase training gradually, and ease up on training when needed    Have an expert check your gait for running or other sporting activities    Stretch properly before and after exercise    Replace your running shoes regularly    Lose excess weight     When to call your healthcare provider  Call your healthcare provider right away if:    Your symptoms don t get better after a few weeks of treatment    You have any new symptoms   Date Last Reviewed: 4/1/2017 2000-2017 [employer]. [address] [place] [address], [place] [address]. All rights reserved. This information is not intended as a substitute for professional medical care. Always follow your healthcare professional's instructions.
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
## 2017-12-21 NOTE — IP AVS SNAPSHOT
"          [facility]: [phone]                                              INTERAGENCY TRANSFER FORM - LAB / IMAGING / EKG / EMG RESULTS   2017                    Hospital Admission Date: 2017  NHUNG MONTALVO   : 1935  Sex: Male        Attending Provider: Veronica Hood NP     Allergies:  Hydrocodone, Penicillins    Infection:  None   Service:  GENERAL MEDI    Ht:  1.778 m (5' 10\")   Wt:  164.1 kg (361 lb 12.4 oz)   Admission Wt:  164.1 kg (361 lb 12.4 oz)    BMI:  51.91 kg/m 2   BSA:  2.85 m 2            Patient PCP Information     Provider PCP Type    Moreno Desai MD General         Lab Results - 3 Days      Glucose by meter [[id number]] (Abnormal)  Resulted: 17 1315, Result status: Final result    Ordering provider: Lamonte Dc MD  17 1306 Resulting lab: POINT OF CARE TEST, GLUCOSE    Specimen Information    Type Source Collected On     17 1306          Components       Value Reference Range Flag Lab   Glucose 105 70 - 99 mg/dL H 170            Glucose by meter [[id number]] (Abnormal)  Resulted: 17 0941, Result status: Final result    Ordering provider: Lamonte Dc MD  17 0929 Resulting lab: POINT OF CARE TEST, GLUCOSE    Specimen Information    Type Source Collected On     17 0929          Components       Value Reference Range Flag Lab   Glucose 117 70 - 99 mg/dL H 170            Active MRSA Surveillance Culture [[id number]]  Resulted: 17 0752, Result status: Preliminary result    Ordering provider: Lamonte Dc MD  17 Resulting lab: [facility]    Specimen Information    Type Source Collected On   Nares Nose 17 2115          Components       Value Reference Range Flag Lab   Specimen Description Nares      Culture Micro Culture in progress   HI            Glucose by meter [[id number]] (Abnormal)  Resulted: 17 0211, Result status: Final result    Ordering " provider: Lamonte Dc MD  12/22/17 0208 Resulting lab: POINT OF CARE TEST, GLUCOSE    Specimen Information    Type Source Collected On     12/22/17 0208          Components       Value Reference Range Flag Lab   Glucose 141 70 - 99 mg/dL H 170            Estimated Average Glucose [[id number]]  Resulted: 12/22/17 0011, Result status: Final result    Ordering provider: Lamonte Dc MD  12/21/17 1623 Resulting lab: [facility]    Specimen Information    Type Source Collected On     12/21/17 1623          Components       Value Reference Range Flag Lab   Estimated Average Glucose 126 mg/dL  HI            Hemoglobin A1c [[id number]]  Resulted: 12/21/17 2326, Result status: Final result    Ordering provider: Lamonte Dc MD  12/21/17 2105 Resulting lab: [facility]    Specimen Information    Type Source Collected On   Blood  12/21/17 1623          Components       Value Reference Range Flag Lab   Hemoglobin A1C 6.0 4.3 - 6.0 %  HI            Glucose by meter [[id number]] (Abnormal)  Resulted: 12/21/17 2212, Result status: Final result    Ordering provider: Lamonte Dc MD  12/21/17 2149 Resulting lab: POINT OF CARE TEST, GLUCOSE    Specimen Information    Type Source Collected On     12/21/17 2149          Components       Value Reference Range Flag Lab   Glucose 128 70 - 99 mg/dL H 170            UA with Microscopic [[id number]] (Abnormal)  Resulted: 12/21/17 2211, Result status: Final result    Ordering provider: Judy Sparrow MD  12/21/17 1603 Resulting lab: [facility]    Specimen Information    Type Source Collected On   Midstream Urine  12/21/17 2115          Components       Value Reference Range Flag Lab   Color Urine Yellow   HI   Appearance Urine Clear   HI   Glucose Urine Negative NEG^Negative mg/dL  HI   Bilirubin Urine Negative NEG^Negative  HI   Ketones Urine Negative NEG^Negative mg/dL  HI   Specific Gravity Urine 1.015  1.003 - 1.035  HI   Blood Urine Negative NEG^Negative  HI   pH Urine 5.5 4.7 - 8.0 pH  HI   Protein Albumin Urine 30 NEG^Negative mg/dL A HI   Urobilinogen mg/dL Normal 0.0 - 2.0 mg/dL  HI   Nitrite Urine Negative NEG^Negative  HI   Leukocyte Esterase Urine Negative NEG^Negative  HI   Source Midstream Urine   HI   WBC Urine 1 0 - 2 /HPF  HI   RBC Urine 1 0 - 2 /HPF  HI   Bacteria Urine None NEG^Negative /HPF A HI   Mucous Urine Present NEG^Negative /LPF A HI            Comprehensive metabolic panel [[id number]] (Abnormal)  Resulted: 12/21/17 1647, Result status: Final result    Ordering provider: Judy Sparrow MD  12/21/17 1617 Resulting lab: [facility]    Specimen Information    Type Source Collected On   Blood  12/21/17 1624          Components       Value Reference Range Flag Lab   Sodium 142 133 - 144 mmol/L  HI   Potassium 4.4 3.4 - 5.3 mmol/L  HI   Chloride 106 94 - 109 mmol/L  HI   Carbon Dioxide 28 20 - 32 mmol/L  HI   Anion Gap 8 3 - 14 mmol/L  HI   Glucose 139 70 - 99 mg/dL H HI   Urea Nitrogen 32 7 - 30 mg/dL H HI   Creatinine 1.36 0.66 - 1.25 mg/dL H HI   GFR Estimate 50 >60 mL/min/1.7m2 L HI   Comment:  Non  GFR Calc   GFR Estimate If Black 61 >60 mL/min/1.7m2  HI   Comment:  African American GFR Calc   Calcium 9.4 8.5 - 10.1 mg/dL  HI   Bilirubin Total 0.3 0.2 - 1.3 mg/dL  HI   Albumin 3.0 3.4 - 5.0 g/dL L HI   Protein Total 7.1 6.8 - 8.8 g/dL  HI   Alkaline Phosphatase 52 40 - 150 U/L  HI   ALT 23 0 - 70 U/L  HI   AST 36 0 - 45 U/L  HI            CBC with platelets differential [[id number]] (Abnormal)  Resulted: 12/21/17 1630, Result status: Final result    Ordering provider: Judy Sparrow MD  12/21/17 1616 Resulting lab: [facility]    Specimen Information    Type Source Collected On   Blood  12/21/17 1624          Components       Value Reference Range Flag Lab   WBC 5.9 4.0 - 11.0 10e9/L  HI   RBC Count 4.06 4.4 - 5.9 10e12/L L HI   Hemoglobin  12.9 13.3 - 17.7 g/dL L HI   Hematocrit 39.9 40.0 - 53.0 % L HI   MCV 98 78 - 100 fl  HI   MCH 31.8 26.5 - 33.0 pg  HI   MCHC 32.3 31.5 - 36.5 g/dL  HI   RDW 13.2 10.0 - 15.0 %  HI   Platelet Count 160 150 - 450 10e9/L  HI   Diff Method Automated Method   HI   % Neutrophils 63.1 %  HI   % Lymphocytes 23.7 %  HI   % Monocytes 10.6 %  HI   % Eosinophils 2.0 %  HI   % Basophils 0.3 %  HI   % Immature Granulocytes 0.3 %  HI   Nucleated RBCs 0 0 /100  HI   Absolute Neutrophil 3.7 1.6 - 8.3 10e9/L  HI   Absolute Lymphocytes 1.4 0.8 - 5.3 10e9/L  HI   Absolute Monocytes 0.6 0.0 - 1.3 10e9/L  HI   Absolute Eosinophils 0.1 0.0 - 0.7 10e9/L  HI   Absolute Basophils 0.0 0.0 - 0.2 10e9/L  HI   Abs Immature Granulocytes 0.0 0 - 0.4 10e9/L  HI   Absolute Nucleated RBC 0.0   HI            Testing Performed By     Lab - Abbreviation Name Director Address Valid Date Range    170 - Unknown POINT OF CARE TEST, GLUCOSE Unknown Unknown 10/31/11 1114 - Present    210 - HI [facility] Unknown [address] [id number] 05/08/15 1057 - Present            Unresulted Labs     None         Imaging Results - 3 Days      XR Chest Port 1 View [[id number]]  Resulted: 12/21/17 1723, Result status: Final result    Ordering provider: Judy Sparrow MD  12/21/17 1623 Resulted by: Jose Roberto Riggs MD    Performed: 12/21/17 1630 - 12/21/17 1648 Resulting lab: RADIOLOGY RESULTS    Narrative:       XR CHEST PORT 1 VW    HISTORY: 82 yearsMale weakness;     TECHNIQUE: A single view of the chest was performed    COMPARISON: 9/15/2015    FINDINGS: There is chronic mediastinal shift to the right. Mediastinal  elements obscure optimal visualization of the right lung. These  changes were present on the prior study. The upper right lung and left  lung are clear. There is advanced arthritic change of both shoulders.      Impression:       IMPRESSION: No significant interval change from the prior study.    JOSE ROBERTO RIGGS MD       Testing Performed By     Lab - Abbreviation Name Director Address Valid Date Range    104 - Rad Rslts RADIOLOGY RESULTS Unknown Unknown 02/16/05 1553 - Present            Encounter-Level Documents:     There are no encounter-level documents.      Order-Level Documents:     There are no order-level documents.

## 2017-12-21 NOTE — ED NOTES
"Pt to ED room 11 by [facility] with c/o needing help at home. Pt states \"I can't even stand, and I don't have anyone to take care of me.\" Pt was seen on 12/17 for knee pain and has been home attempting to care for self since. Pt denies pain and n/v and is alert and oriented. Pt states \"I just want placement temporarily to get stronger.\"   "

## 2017-12-21 NOTE — IP AVS SNAPSHOT
"` `           [facility]: [phone]                                              INTERAGENCY TRANSFER FORM - NURSING   2017                    Hospital Admission Date: 2017  NHUNG MONTALVO   : 1935  Sex: Male        Attending Provider: Veronica Hood NP     Allergies:  Hydrocodone, Penicillins    Infection:  None   Service:  GENERAL MEDI    Ht:  1.778 m (5' 10\")   Wt:  164.1 kg (361 lb 12.4 oz)   Admission Wt:  164.1 kg (361 lb 12.4 oz)    BMI:  51.91 kg/m 2   BSA:  2.85 m 2            Patient PCP Information     Provider PCP Type    Moreno Desai MD General      Current Code Status     Date Active Code Status Order ID Comments User Context       Prior      Code Status History     Date Active Date Inactive Code Status Order ID Comments User Context    2017  1:19 PM  DNR/DNI [id number]  Veronica Hood NP Outpatient    2017  9:05 PM 2017  1:19 PM DNR/DNI [id number]  Lamonte Dc MD Inpatient    2017  3:41 PM 2017  9:05 PM DNR [id number]  Veronica Hood NP Outpatient    2017 12:06 AM 2017  3:41 PM DNR [id number]  Aalyna Sánchez MD Inpatient      Advance Directives        Does patient have a scanned Advance Directive/ACP document in EPIC?           Yes        Hospital Problems as of 2017              Priority Class Noted POA    Chronic kidney disease, stage III (moderate) Medium  Unknown Yes    Morbid (severe) obesity due to excess calories (H) Medium  Unknown Yes    Obstructive sleep apnea Medium  Unknown Yes    Controlled type 2 diabetes mellitus without complication, with long-term current use of insulin (H) Medium  10/5/2016 Yes    Type 2 diabetes mellitus with diabetic nephropathy, with long-term current use of insulin (H) Medium  10/5/2016 Yes    Essential hypertension with goal blood pressure less than 140/90 Medium  10/11/2016 Yes    Chronic pain syndrome Medium  2017 Yes    Physical deconditioning " Medium  12/21/2017 Yes      Non-Hospital Problems as of 12/22/2017              Priority Class Noted    Vitamin B12 deficiency Medium  8/22/2011    Pure hypercholesterolemia Medium  Unknown    ACP (advance care planning) Medium  4/18/2016    Skin ulcer (H) Medium  Unknown    Advanced directives, counseling/discussion Medium  10/5/2016    Convulsive syncope Medium  3/12/2017    Right hip pain Medium  7/29/2017    Osteoarthrosis involving lower leg Medium  8/4/2017      Immunizations     Name Date      Influenza (High Dose) 3 valent vaccine 10/18/17     Influenza (High Dose) 3 valent vaccine 10/05/16     Influenza (High Dose) 3 valent vaccine 10/13/15     Pneumo Conj 13-V (2010&after) 04/18/16     Pneumococcal 23 valent 05/19/14     TDAP Vaccine (Adacel) 05/19/14     Zoster vaccine, live 11/18/14          END      ASSESSMENT     Discharge Profile Flowsheet     DISCHARGE NEEDS ASSESSMENT     Patient's communication style  spoken language (English or Bilingual) 12/21/17 1537    Patient/family verbalizes understanding of discharge plan recommendations?  Yes 12/22/17 0956   SKIN      Medical Team notified of plan?  yes 12/22/17 0956   Inspection of bony prominences  Full 12/22/17 0026    Readmission Within The Last 30 Days  no previous admission in last 30 days 12/22/17 0956   Inspection under devices  Full 12/22/17 0026    Equipment Currently Used at Home  oxygen;cane, straight 07/29/17 1351   Skin WDL  ex 12/22/17 0026    # of Referrals Placed by CTS  Transportation;Communication hand-offs to next level of Care Providers;Financial Services 12/22/17 0956   Skin Temperature  warm 12/22/17 0026    GASTROINTESTINAL (ADULT,PEDIATRIC,OB)     Skin Moisture  dry 12/22/17 0026    GI WDL  ex 12/22/17 0026   Skin Elasticity  quick return to original state 12/22/17 0026    Abdominal Appearance  obese;inguinal bulge 12/22/17 0026   Skin Integrity  abrasion(s);scar(s) 12/22/17 0026    All Quadrants Bowel Sounds  audible and active in  "all quadrants 12/22/17 0026   SAFETY      All Quadrants Palpation  soft/nontender 12/21/17 1621   Safety WDL  WDL 12/22/17 0026    Passing flatus  yes 12/22/17 0026   All Alarms  alarm(s) activated and audible 12/22/17 0026    COMMUNICATION ASSESSMENT                        Assessment WDL (Within Defined Limits) Definitions           Safety WDL     Effective: 09/28/15    Row Information: <b>WDL Definition:</b> Bed in low position, wheels locked; call light in reach; upper side rails up x 2; ID band on<br> <font color=\"gray\"><i>Item=AS safety wdl>>List=AS safety wdl>>Version=F14</i></font>      Skin WDL     Effective: 09/28/15    Row Information: <b>WDL Definition:</b> Warm; dry; intact; elastic; without discoloration; pressure points without redness<br> <font color=\"gray\"><i>Item=AS skin wdl>>List=AS skin wdl>>Version=F14</i></font>      Vitals     Vital Signs Flowsheet     VITAL SIGNS     Height Method  Stated 12/21/17 2011    Temp  96.9  F (36.1  C) 12/22/17 0922   Weight  (!)  164.1 kg (361 lb 12.4 oz) (Simultaneous filing. User may not have seen previous data.) 12/22/17 0518    Temp src  Tympanic 12/22/17 0922   Weight Method  Bed scale (Simultaneous filing. User may not have seen previous data.) 12/22/17 0518    Resp  16 12/22/17 0922   Bed Scale  Standard (fitted sheet, draw sheet/ pad, cover/flat sheet, blanket, two pillows) (large lift sheet) 12/22/17 0518    Pulse  68 12/21/17 2011   CLINICALLY ALIGNED PAIN ASSESSMENT (CAPA) ([facility], [facility] AND [facility] ADULTS ONLY)      Heart Rate  55 12/22/17 1217   Comfort  comfortably manageable 12/22/17 0922    Pulse/Heart Rate Source  Monitor 12/22/17 1217   Change in Pain  about the same 12/22/17 0922    BP  (!)  184/101 12/22/17 1217   Pain Control  fully effective 12/22/17 0922    OXYGEN THERAPY     Functioning  can do everything I need to 12/22/17 0922    SpO2  100 % 12/22/17 0922   Sleep  normal sleep 12/22/17 0922    O2 Device  BiPAP/CPAP 12/22/17 0922   " "TORI COMA SCALE      Oxygen Delivery  3 LPM 12/22/17 0922   Best Eye Response  4-->(E4) spontaneous 12/22/17 0922    PAIN/COMFORT     Best Motor Response  6-->(M6) obeys commands 12/22/17 0922    Patient Currently in Pain  denies 12/22/17 0922   Best Verbal Response  5-->(V5) oriented 12/22/17 0922    Preferred Pain Scale  word (verbal rating pain scale) 12/22/17 0922   [place] Coma Scale Score  15 12/22/17 0922    Patient's Stated Pain Goal  No pain 12/22/17 0922   POSITIONING      0-10 Pain Scale  0 12/22/17 0922   Body Position  side-lying, right;independently positioning (repositioned per patient request) 12/22/17 1244    Word Pain Scale  0 12/22/17 0922   Head of Bed (HOB)  HOB at 20 degrees 12/22/17 1244    POINT OF CARE TESTING     Positioning/Transfer Devices  pillows;in use 12/22/17 1244    Puncture Site  fingertip 12/22/17 0210   DAILY CARE      Bedside Glucose (mg/dl )   141 mg/dl 12/22/17 0210   Activity Management  activity adjusted per tolerance 12/22/17 0922    HEIGHT AND WEIGHT     Activity Assistance Provided  assistance, 2 people 12/22/17 0520    Height  1.778 m (5' 10\") 12/21/17 2011                 Patient Lines/Drains/Airways Status    Active LINES/DRAINS/AIRWAYS     None            Patient Lines/Drains/Airways Status    Active PICC/CVC     None            Intake/Output Detail Report     Date Intake     Output Net    Shift P.O. I.V. IV Piggyback Total Urine Total       Noc 12/20/17 2300 - 12/21/17 0659 -- -- -- -- -- -- 0    Day 12/21/17 0700 - 12/21/17 1459 -- -- -- -- -- -- 0    Addie 12/21/17 1500 - 12/21/17 2259 120 -- -- 120 250 250 -130    Noc 12/21/17 2300 - 12/22/17 0659 240 -- -- 240 400 400 -160    Day 12/22/17 0700 - 12/22/17 1459 -- -- -- -- 500 500 -500      Case Management/Discharge Planning     Case Management/Discharge Planning Flowsheet     REFERRAL INFORMATION     Medical Team notified of plan?  yes 12/22/17 0956    # of Referrals Placed by CTS  " Transportation;Communication hand-offs to next level of Care Providers;Financial Services 12/22/17 0956   Readmission Within The Last 30 Days  no previous admission in last 30 days 12/22/17 0956    CTS Assigned to Case  Alisa BRANCH 12/22/17 0956   FINAL NOTE      Primary Care Clinic Name  [facility] 07/29/17 1351   Final Note  see notes 12/22/17 0956    Primary Care MD Name  Moreno Desai 12/22/17 0956   FINAL RESOURCES      LIVING ENVIRONMENT     Equipment Currently Used at Home  oxygen;cane, straight 07/29/17 1351    Lives With  alone 12/22/17 0956   ABUSE RISK SCREEN      Living Arrangements  house 12/22/17 0956   QUESTION TO PATIENT:  Has a member of your family or a partner(now or in the past) intimidated, hurt, manipulated, or controlled you in any way?  no 12/21/17 1537    Provides Primary Care For  no one, unable/limited ability to care for self 12/22/17 0956   QUESTION TO PATIENT: Do you feel safe going back to the place where you are living?  no (describe) 12/21/17 1537    Able to Return to Prior Living Arrangements  no 12/22/17 0956   If no, describe the patient's reason  states that he is unable to walk and states he has nobody to help him  12/21/17 1537    ASSESSMENT OF FAMILY/SOCIAL SUPPORT     OBSERVATION: Is there reason to believe there has been maltreatment of a vulnerable adult (ie. Physical/Sexual/Emotional abuse, self neglect, lack of adequate food, shelter, medical care, or financial exploitation)?  no 12/21/17 1537    Marital Status   12/22/17 0956   (R) MENTAL HEALTH SUICIDE RISK      COPING/STRESS     Are you depressed or being treated for depression?  No 12/21/17 2025    Major Change/Loss/Stressor  denies 12/21/17 2025   HOMICIDE RISK      DISCHARGE PLANNING     Feels Like Hurting Others  no 12/21/17 1537    Patient/family verbalizes understanding of discharge plan recommendations?  Yes 12/22/17 0956

## 2017-12-21 NOTE — PROGRESS NOTES
Received call from HERI Sanchez in the ED. Pt presented to ED and will most likely need placement. Pt was in the ED earlier this week with falls at home.    Call to the area facilities as follows :    Message left for   [facility]   [facility]  [facility]   [facility]  [facility]      Spoke with  Alexandre Boyd. No openings until next week as beds are full.   [facility] No availability until next week.

## 2017-12-21 NOTE — IP AVS SNAPSHOT
[facility]    [address] [place] [address]    [place] [address]    Phone:  [phone]    Fax:  [phone]                                       After Visit Summary   12/21/2017    Trey Washington    MRN: [id number]           After Visit Summary Signature Page     I have received my discharge instructions, and my questions have been answered. I have discussed any challenges I see with this plan with the nurse or doctor.    ..........................................................................................................................................  Patient/Patient Representative Signature      ..........................................................................................................................................  Patient Representative Print Name and Relationship to Patient    ..................................................               ................................................  Date                                            Time    ..........................................................................................................................................  Reviewed by Signature/Title    ...................................................              ..............................................  Date                                                            Time
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
## 2017-12-21 NOTE — IP AVS SNAPSHOT
"    [facility]: [phone]                                              INTERAGENCY TRANSFER FORM - PHYSICIAN ORDERS   2017                    Hospital Admission Date: 2017  NHUNG MONTALVO   : 1935  Sex: Male        Attending Provider: Veronica Hood NP     Allergies:  Hydrocodone, Penicillins    Infection:  None   Service:  GENERAL [facility]    Ht:  1.778 m (5' 10\")   Wt:  164.1 kg (361 lb 12.4 oz)   Admission Wt:  164.1 kg (361 lb 12.4 oz)    BMI:  51.91 kg/m 2   BSA:  2.85 m 2            Patient PCP Information     Provider PCP Type    Moreno Desai MD General      ED Clinical Impression     Diagnosis Description Comment Added By Time Added    Weakness [R53.1] Weakness [R53.1]  Judy Sparrow MD 2017  7:03 PM    Morbid (severe) obesity due to excess calories (H) [E66.01] Morbid (severe) obesity due to excess calories (H) [E66.01]  Judy Sparrow MD 2017  7:22 PM    Controlled type 2 diabetes mellitus without complication, with long-term current use of insulin (H) [E11.9, Z79.4] Controlled type 2 diabetes mellitus without complication, with long-term current use of insulin (H) [E11.9, Z79.4]  Veronica Hood NP 2017  1:18 PM    Type 2 diabetes mellitus with diabetic nephropathy, with long-term current use of insulin (H) [E11.21, Z79.4] Type 2 diabetes mellitus with diabetic nephropathy, with long-term current use of insulin (H) [E11.21, Z79.4]  Veronica Hood NP 2017  1:19 PM    Chronic pain syndrome [G89.4] Chronic pain syndrome [G89.4]  Veronica Hood NP 2017  1:19 PM    Physical deconditioning [R53.81] Physical deconditioning [R53.81]  Veronica Hood NP 2017  1:19 PM    Osteoarthrosis involving lower leg [M17.10] Osteoarthrosis involving lower leg [M17.10]  Veronica Hood NP 2017  1:19 PM      Hospital Problems as of 2017              Priority Class Noted POA    Chronic kidney disease, stage III " (moderate) Medium  Unknown Yes    Morbid (severe) obesity due to excess calories (H) Medium  Unknown Yes    Obstructive sleep apnea Medium  Unknown Yes    Controlled type 2 diabetes mellitus without complication, with long-term current use of insulin (H) Medium  10/5/2016 Yes    Type 2 diabetes mellitus with diabetic nephropathy, with long-term current use of insulin (H) Medium  10/5/2016 Yes    Essential hypertension with goal blood pressure less than 140/90 Medium  10/11/2016 Yes    Chronic pain syndrome Medium  12/19/2017 Yes    Physical deconditioning Medium  12/21/2017 Yes      Non-Hospital Problems as of 12/22/2017              Priority Class Noted    Vitamin B12 deficiency Medium  8/22/2011    Pure hypercholesterolemia Medium  Unknown    ACP (advance care planning) Medium  4/18/2016    Skin ulcer (H) Medium  Unknown    Advanced directives, counseling/discussion Medium  10/5/2016    Convulsive syncope Medium  3/12/2017    Right hip pain Medium  7/29/2017    Osteoarthrosis involving lower leg Medium  8/4/2017      Code Status History     Date Active Date Inactive Code Status Order ID Comments User Context    12/22/2017  1:19 PM  DNR/DNI [id number]  Veronica Hood NP Outpatient    12/21/2017  9:05 PM 12/22/2017  1:19 PM DNR/DNI [id number]  Lamonte Dc MD Inpatient    7/31/2017  3:41 PM 12/21/2017  9:05 PM DNR [id number]  Veronica Hood NP Outpatient    7/29/2017 12:06 AM 7/31/2017  3:41 PM DNR [id number]  Alayna Sánchez MD Inpatient         Medication Review      CONTINUE these medications which may have CHANGED, or have new prescriptions. If we are uncertain of the size of tablets/capsules you have at home, strength may be listed as something that might have changed.        Dose / Directions Comments    * insulin aspart 100 UNIT/ML injection   Commonly known as:  NovoLOG FLEXPEN   This may have changed:  See the new instructions.   Used for:  Type 2 diabetes mellitus with diabetic  "nephropathy, with long-term current use of insulin (H)        INJECT 8 UNITS SUBCUTANEOUS with lunch   Quantity:  9 mL   Refills:  0        * insulin aspart 100 UNIT/ML injection   Commonly known as:  NovoLOG PEN   This may have changed:  You were already taking a medication with the same name, and this prescription was added. Make sure you understand how and when to take each.   Used for:  Type 2 diabetes mellitus with diabetic nephropathy, with long-term current use of insulin (H)        Dose:  1-7 Units   Inject 1-7 Units Subcutaneous 3 times daily (before meals) Do Not give Correction Insulin if Pre-Meal BG less than 140.  For Pre-Meal  - 189=1 unit.  For Pre-Meal  - 239=2 units.  For Pre-Meal  - 289=3 units.  For Pre-Meal  - 339=4 units.  For Pre-Meal - 399=5 units.  For Pre-Meal -449=6 units For Pre-Meal BG >=450 give 7 units.  To be given with prandial insulin, and based on pre-meal blood glucose.   Refills:  0        insulin glargine 100 UNIT/ML injection   Commonly known as:  LANTUS SOLOSTAR   This may have changed:  See the new instructions.   Used for:  Type 2 diabetes mellitus with diabetic nephropathy, with long-term current use of insulin (H)        Dose:  10 Units   Inject 10 Units Subcutaneous At Bedtime   Refills:  0        * Notice:  This list has 2 medication(s) that are the same as other medications prescribed for you. Read the directions carefully, and ask your doctor or other care provider to review them with you.      CONTINUE these medications which have NOT CHANGED        Dose / Directions Comments    allopurinol 300 MG tablet   Commonly known as:  ZYLOPRIM   Used for:  Idiopathic gout, unspecified chronicity, unspecified site        Dose:  1 tablet   Take 1 tablet (300 mg) by mouth daily   Quantity:  90 tablet   Refills:  3        AQUACEL-AG EXTRA HYDROFIBER 4\"X5\" Pads   Used for:  Open wound of abdomen        Dose:  0.25 each   Externally apply 0.25 each " topically every other day Use as directed by wound center   Quantity:  5 each   Refills:  3    Ok to substitute any silver hydrofiber/gelling fiber.       aspirin 81 MG tablet        Dose:  1 tablet   Take 1 tablet by mouth daily   Refills:  0        blood glucose monitoring lancets   Used for:  IDDM (insulin dependent diabetes mellitus) (H)        USE AS DIRECTED TESTING BLOOD SUGAR TWICE DAILY OR AS DIRECTED   Quantity:  200 each   Refills:  1        blood glucose monitoring meter device kit   Used for:  IDDM (insulin dependent diabetes mellitus) (H)        Use to test blood sugars 2 times daily or as directed.   Quantity:  1 kit   Refills:  0        blood glucose monitoring test strip   Commonly known as:  KEO CONTOUR   Used for:  IDDM (insulin dependent diabetes mellitus) (H)        Use to test blood sugars 2 times daily or as directed.   Quantity:  200 each   Refills:  3        CAPOTEN PO   Indication:  High Blood Pressure Disorder        Dose:  50 mg   Take 50 mg by mouth 3 times daily   Refills:  0        cyanocolbalamin 500 MCG tablet   Commonly known as:  vitamin  B-12   Used for:  Vitamin deficiency        TAKE 2 TABLETS (1,000 MCG) BY MOUTH DAILY   Quantity:  180 tablet   Refills:  1        DOCQLACE 100 MG capsule   Used for:  Constipation   Generic drug:  docusate sodium        TAKE 1 CAPSULE BY MOUTH TWICE DAILY   Quantity:  100 capsule   Refills:  1    This prescription was filled on 7/24/2017. Any refills authorized will be placed on file.       fish oil-omega-3 fatty acids 1000 MG capsule        Dose:  1000 mg   Take 1,000 mg by mouth daily   Refills:  0        ketoconazole 2 % cream   Commonly known as:  NIZORAL        Apply topically 2 times daily as needed   Refills:  0        levETIRAcetam 1000 MG Tabs   Used for:  Seizure disorder (H)        TAKE 1 TABLET BY MOUTH TWICE DAILY   Quantity:  60 tablet   Refills:  5        metFORMIN 1000 MG tablet   Commonly known as:  GLUCOPHAGE   Used for:   Diabetes mellitus, type 2 (H)        TAKE 1 TABLET BY MOUTH EVERY DAY WITH A MEAL   Quantity:  90 tablet   Refills:  1        miconazole 2 % powder   Commonly known as:  MICATIN; MICRO GUARD        Apply topically 2 times daily as needed   Refills:  0        NOVOFINE 30G X 8 MM   Used for:  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, not stated as uncontrolled(250.11)   Generic drug:  insulin pen needle        USE 4 PENS NEEDLES DAILY OR AS DIRECTED   Quantity:  100 each   Refills:  6    This prescription was filled on 7/6/2017. Any refills authorized will be placed on file.       * order for DME   Used for:  Hypoxia        Equipment being ordered: Oxygen 2 LPM per nasal cannula during the day, portable also   Quantity:  1 each   Refills:  11        * order for DME   Used for:  Morbid obesity, unspecified obesity type (H)        Equipment being ordered: Wheelchair   Quantity:  1 each   Refills:  0        * order for DME   Used for:  Skin ulcer, limited to breakdown of skin (H)        Equipment being ordered: Meplilex Transfer 6 X 8 inch ([id number]) - disp 3;  Aquacel AG 4 X 5 ([id number]); disp 3 - Refills 6   Quantity:  1 each   Refills:  6        order for DME   Used for:  Ulcer of skin (H)        4x4 bulk SOFT gauze (disp 1 sleeve, refill x 5)   Quantity:  1 Units   Refills:  0        Oyster Shell Calcium 500 MG tablet   Commonly known as:  OS-[place]ris 500 mg [place]. Ca   Used for:  HTN (hypertension)        TAKE 1 TABLET BY MOUTH TWICE DAILY   Quantity:  180 tablet   Refills:  2        polyethylene glycol powder   Commonly known as:  MIRALAX/GLYCOLAX   Used for:  Constipation        TAKE 17 GRAMS BY MOUTH DAILY MIXED AS DIRECTED.   Quantity:  527 g   Refills:  8    This prescription was filled on 8/28/2017. Any refills authorized will be placed on file.       sertraline 100 MG tablet   Commonly known as:  ZOLOFT   Used for:  Dysthymia        TAKE 1 TABLET BY MOUTH ONCE DAILY   Quantity:  90 tablet   Refills:   3        simvastatin 10 MG tablet   Commonly known as:  ZOCOR   Used for:  Hypercholesterolemia        TAKE 1 TABLET BY MOUTH NIGHTLY AT BEDTIME   Quantity:  90 tablet   Refills:  0    This prescription was filled on 12/12/2017. Any refills authorized will be placed on file.       T.E.D. BELOW KNEE/L-REGULAR Misc   Used for:  Swelling of limb        Apply in am and take off in the evening   Quantity:  6 each   Refills:  11        terazosin 10 MG capsule   Commonly known as:  HYTRIN   Used for:  Essential hypertension        TAKE 1 CAPSULE BY MOUTH NIGHTLY AT BEDTIME   Quantity:  90 capsule   Refills:  2        traMADol 50 MG tablet   Commonly known as:  ULTRAM   Used for:  Type 2 diabetes mellitus with diabetic nephropathy, with long-term current use of insulin (H), Chronic pain syndrome, Osteoarthrosis involving lower leg        Dose:  50 mg   Take 1 tablet (50 mg) by mouth every 8 hours as needed for moderate pain   Quantity:  30 tablet   Refills:  3        TYLENOL PO        Dose:  1000 mg   Take 1,000 mg by mouth At Bedtime   Refills:  0        UNABLE TO FIND        CPAP   Refills:  0        UNISTIK 3 NORMAL Misc   Used for:  Diabetes mellitus, type 2 (H)        USE TO TEST BLOOD SUGARS TWO TIMES A DAY   Quantity:  100 each   Refills:  3        VITAMIN C PO        Dose:  500 mg   Take 500 mg by mouth daily   Refills:  0        VITAMIN D HIGH POTENCY 1000 UNITS Caps   Used for:  Vitamin D deficiency        TAKE 1 CAPSULE BY MOUTH DAILY   Quantity:  90 capsule   Refills:  3    This prescription was filled on 8/7/2017. Any refills authorized will be placed on file.       vitamin E 100 UNIT capsule   Commonly known as:  TOCOPHEROL        Dose:  100 Units   Take 100 Units by mouth daily Uncertain of dose   Refills:  0        * Notice:  This list has 3 medication(s) that are the same as other medications prescribed for you. Read the directions carefully, and ask your doctor or other care provider to review them with  you.            Summary of Visit     Reason for your hospital stay       Left knee pain after fall,progressive weakness             After Care     Activity - Up with nursing assistance           Additional Discharge Instructions       CPAP at night with home settings: Pressure 11, EPR 1, 0 ramp time       Advance Diet as Tolerated       Follow this diet upon discharge: Orders Placed This Encounter      Moderate Consistent CHO Diet       Fall precautions           General info for SNF       Length of Stay Estimate: Short Term Care: Estimated # of Days <30  Condition at Discharge: Stable  Level of care:skilled   Rehabilitation Potential: Fair  Admission H&P remains valid and up-to-date: Yes    Use Nursing Home Standing Orders: Yes       Glucose monitor nursing POCT       Before meals and at bedtime             Referrals     Occupational Therapy Adult Consult       Evaluate and treat as clinically indicated.    Reason:  Eval and treat       Physical Therapy Adult Consult       Evaluate and treat as clinically indicated.    Reason:  eval and treat             Statement of Approval     Ordered          12/22/17 1320  I have reviewed and agree with all the recommendations and orders detailed in this document.  EFFECTIVE NOW     Approved and electronically signed by:  Veronica Hood NP

## 2017-12-21 NOTE — ED PROVIDER NOTES
"eMERGENCY dEPARTMENT eNCOUnter        CHIEF COMPLAINT    Chief Complaint   Patient presents with     Social Work Services       HPI    Trey Washington is a 82 year old male who arrives by ambulance today after his caregiver told him that she could no longer care for him.  He was in the ER on 12/17 and went back by ambulance, the fire department and healthline got him back into his bed, he literally has not been out of bed since then, 5 days ago.  Not out of bed to urinate, has been eating in bed.    He doesn't have any .   There are phone records to Dr. Desai's office on 12/18 in which there is discussion about getting him into [facility] in [place], it looks like \"Adri\" was to bring him into the emergency room, this was 2 days ago.  They arrive 330 in the afternoon today.  He says he can't care for himself at home but that he has no money, all his money \"is in a trust for my daughter\", who is in a group home.  \"my  told me medicare would pay for my nursing home\".  He had a fall on 12/17 prio to last ER admission but nothing since then as hasn't gotten out of bed.    REVIEW OF SYSTEMS    Neurologic: no LOC, No hearing loss  Cardiac: No Chest Pain, no syncope  Respiratory: No cough or difficulty breathing  GI: No Bloody Stool or Diarrhea  : No Dysuria or Hematuria  General: No Fever  All other systems reviewed and are negative.    PAST MEDICAL & SURGICAL HISTORY    Past Medical History:   Diagnosis Date     Chronic kidney disease, stage III (moderate)      Obesity, unspecified      Obstructive sleep apnea (adult) (pediatric)      Osteoarthrosis, unspecified whether generalized or localized, lower leg      Other abnormal blood chemistry     hyperuricemia     Other B-complex deficiencies      Other choreas     Hemiballism     Pure hypercholesterolemia      Skin ulcer (H)      Type II or unspecified type diabetes mellitus without mention of complication, not stated as uncontrolled  "     Unspecified essential hypertension      Past Surgical History:   Procedure Laterality Date     COLONOSCOPY       ENT SURGERY      lanced abcess in left ear     GENITOURINARY SURGERY      removal of kidney stones     ORTHOPEDIC SURGERY      knee arthroscopy     ORTHOPEDIC SURGERY  2003    bilateral knee replacement     ORTHOPEDIC SURGERY      right knee arthroscopy     PHACOEMULSIFICATION WITH STANDARD INTRAOCULAR LENS IMPLANT  2/11/2014    Procedure: PHACOEMULSIFICATION WITH STANDARD INTRAOCULAR LENS IMPLANT;  CATARACT EXTRACTION WITH INTRA OCULAR LENS RIGHT;  Surgeon: Luis James MD;  Location: [facility]     PHACOEMULSIFICATION WITH STANDARD INTRAOCULAR LENS IMPLANT  3/11/2014    Procedure: PHACOEMULSIFICATION WITH STANDARD INTRAOCULAR LENS IMPLANT;  CATARACT EXTRACTION WITH INTRA OCULAR LENS LEFT  ;  Surgeon: Luis James MD;  Location: [facility]     TONSILLECTOMY & ADENOIDECTOMY  1942       CURRENT MEDICATIONS    Current Outpatient Rx   Medication Sig Dispense Refill     simvastatin (ZOCOR) 10 MG tablet TAKE 1 TABLET BY MOUTH NIGHTLY AT BEDTIME 90 tablet 0     NOVOLOG FLEXPEN 100 UNIT/ML soln INJECT 8 UNITS SUBCUTANEOUS BEFORE BEDTIME IN ADDITION TO SLIDING SCALE 9 mL 0     metFORMIN (GLUCOPHAGE) 1000 MG tablet TAKE 1 TABLET BY MOUTH EVERY DAY WITH A MEAL 90 tablet 1     BASAGLAR 100 UNIT/ML injection Inject 18 Units Subcutaneous At Bedtime 18 mL 3     Oyster Shell Calcium (OS-MARILIN 500 MG [place]. [place]) 500 MG tablet TAKE 1 TABLET BY MOUTH TWICE DAILY 180 tablet 2     blood glucose monitoring (Xcerion MICROLET) lancets USE AS DIRECTED TESTING BLOOD SUGAR TWICE DAILY OR AS DIRECTED 200 each 1     terazosin (HYTRIN) 10 MG capsule TAKE 1 CAPSULE BY MOUTH NIGHTLY AT BEDTIME 90 capsule 2     miconazole (ZEASORB-AF) 2 % powder Apply topically as needed for itching or other (apply topically to affected areas twice daily) 71 g 3     LANTUS SOLOSTAR 100 UNIT/ML soln INJECT 18 UNITS SUBQUTANEOUSLY EVERY DAY AT BEDTIME 15 mL 3  "    Lancets Misc. (UNISTIK 3 NORMAL) MISC USE TO TEST BLOOD SUGARS TWO TIMES A  each 3     polyethylene glycol (MIRALAX/GLYCOLAX) powder TAKE 17 GRAMS BY MOUTH DAILY MIXED AS DIRECTED. 527 g 8     allopurinol (ZYLOPRIM) 300 MG tablet Take 1 tablet (300 mg) by mouth daily 90 tablet 3     levETIRAcetam 1000 MG TABS TAKE 1 TABLET BY MOUTH TWICE DAILY 60 tablet 5     Cholecalciferol (VITAMIN D HIGH POTENCY) 1000 UNITS CAPS TAKE 1 CAPSULE BY MOUTH DAILY 90 capsule 3     traMADol (ULTRAM) 50 MG tablet Take 1 tablet (50 mg) by mouth every 8 hours as needed for moderate pain 90 tablet 3     METOPROLOL TARTRATE PO Take 25 mg by mouth 2 times daily       DOCQLACE 100 MG capsule TAKE 1 CAPSULE BY MOUTH TWICE DAILY 100 capsule 1     cyanocolbalamin (VITAMIN  B-12) 500 MCG tablet TAKE 2 TABLETS (1,000 MCG) BY MOUTH DAILY 180 tablet 1     NOVOFINE 30G X 8 MM insulin pen needle USE 4 PENS NEEDLES DAILY OR AS DIRECTED 100 each 6     captopril (CAPOTEN) 50 MG tablet Take 2 tablets (100 mg) by mouth 3 times daily (Patient taking differently: Take 50 mg by mouth 3 times daily ) 270 tablet 3     fish oil-omega-3 fatty acids 1000 MG capsule Take 1,000 mg by mouth daily       sertraline (ZOLOFT) 100 MG tablet TAKE 1 TABLET BY MOUTH ONCE DAILY 90 tablet 3     Acetaminophen (TYLENOL PO) Take 500 mg by mouth Takes 2 tablets at HS       blood glucose monitoring (KEO CONTOUR MONITOR) meter device kit Use to test blood sugars 2 times daily or as directed. 1 kit 0     blood glucose monitoring (KEO CONTOUR) test strip Use to test blood sugars 2 times daily or as directed. 200 each 3     Silver-Carboxymethylcellulose (AQUACEL-AG EXTRA HYDROFIBER) 4\"X5\" PADS Externally apply 0.25 each topically every other day Use as directed by wound center 5 each 3     order for DME 4x4 bulk SOFT gauze (disp 1 sleeve, refill x 5) 1 Units 0     Ascorbic Acid (VITAMIN C PO) Take by mouth daily       vitamin E (TOCOPHEROL) 100 UNIT capsule Take 100 Units " by mouth daily Uncertain of dose       miconazole (MICATIN) 2 % AERP Apply 113 g topically 2 times daily 1 each 3     order for DME Equipment being ordered: Meplilex Transfer 6 X 8 inch ([id number]) - disp 3;  Aquacel AG 4 X 5 ([id number]); disp 3 - Refills 6 1 each 6     order for DME Equipment being ordered: Oxygen 2 LPM per nasal cannula during the day, portable also 1 each 11     order for DME Equipment being ordered: Wheelchair 1 each 0     Elastic Bandages & Supports (T.E.D. BELOW KNEE/L-REGULAR) MISC Apply in am and take off in the evening 6 each 11     ketoconazole (NIZORAL) 2 % cream Apply topically 2 times daily (Patient taking differently: Apply topically 2 times daily as needed ) 120 g 1     calcium carbonate (OS-MARILIN 500 MG [place]. [place]) 500 MG tablet Take 1 tablet (500 mg) by mouth 2 times daily 180 tablet 1     UNABLE TO FIND CPAP       aspirin 81 MG tablet Take 1 tablet by mouth daily         ALLERGIES    Allergies   Allergen Reactions     Hydrocodone      Intolerance       Penicillins Swelling       SOCIAL & FAMILY HISTORY    Social History     Social History     Marital status:      Spouse name: N/A     Number of children: N/A     Years of education: N/A     Occupational History           retired     Social History Main Topics     Smoking status: Former Smoker     Years: 10.00     Types: Cigars     Quit date: 9/29/1987     Smokeless tobacco: Never Used     Alcohol use No     Drug use: No     Sexual activity: No      Comment:      Other Topics Concern     Parent/Sibling W/ Cabg, Mi Or Angioplasty Before 65f 55m? No      Service Yes     [employer]     Blood Transfusions Yes     Permits if needed     Seat Belt Yes     Social History Narrative     Family History   Problem Relation Age of Onset     C.A.D. Father      MI     Other - See Comments Father      shell shocked in the war     DIABETES Paternal Grandmother        PHYSICAL EXAM    VITAL SIGNS: /93  Temp 97.9  F (36.6  " C) (Oral)  Resp 18  SpO2 97%   Constitutional:  Alert, pleasant male, obese, very immobile.  Requires assist to turn on his side  Eyes: Pupils equally round and react to light, sclera nonicteric  HENT:  Atraumatic, no trismus  Neck: supple, no JVD, no posterior neck tenderness  Respiratory:  Lungs Clear, no retractions   Cardiovascular:  regular rate, no murmurs  GI:  Soft, nontender, normal bowel sounds  Musculoskeletal:  No edema, no bruising  Vascular:  all pulses are 2+ equal bilaterally  Integument:  Well hydrated, no petechiae   Neurologic:  Alert & oriented, no slurred speech  Psych: Pleasant affect, no hallucinations    ED COURSE & MEDICAL DECISION MAKING    Pertinent Labs & Imaging studies reviewed and interpreted. (See chart for details)    See chart for details of medications given during the ED stay.    Vitals:    12/21/17 1526 12/21/17 1600 12/21/17 1632 12/21/17 1840   BP: 122/92 135/100 157/93    Resp: 18      Temp: 97.9  F (36.6  C)      TempSrc: Oral      SpO2: 96%   97%           FINAL IMPRESSION    1. Weakness    2. Morbid (severe) obesity due to excess calories (H)        PLAN  The patient is clearly unable to go home due to weakness and deconditioning.  We had social work meet with him to see about direct admission but it was too late in the day.  He states \"I just want to get mobile enough that I can get back to my little dog\".  Admitted to hospitalist service for PT, OT, likely short term rehab. He had a fall during previous ER admission, has an abrasion but he states no further knee pain       Judy Sparrow MD  12/21/17 1926    "

## 2017-12-21 NOTE — IP AVS SNAPSHOT
` ` Patient Information     Patient Name Sex     Trey Washington ([id number]) Male 1935       Room Bed    3234 3234-1      Patient Demographics     Address Phone E-mail Address    [address] [address] [phone] (Home)  [phone] (Work)  none (Mobile) [email or web address]      Patient Ethnicity & Race     Ethnic Group Patient Race    American White      Emergency Contact(s)     Name Relation Home Work Mobile    EVERT RODRIGUEZ Other [phone]    NO SECONDARY CONTACT Other NONE        Documents on File        Status Date Received Description       Documents for the Patient    Consent for Services - Hospital/Clinic-ESign       [facility] Privacy Notice-ESign       Insurance Card Received () 14 BCBS    Patient ID Received 13 [place]    Consent for EHR Access-Received-ESign       Consent for EHR Access-Decline-ESign       HIM LASHAUN Authorization - File Only   AUTHORIZATION FOR RELEASE OF PROTECTED HEALTH INFORMATION (PHI)    Insurance Card Received 14 MDCR    Consent for Services - Hospital/Clinic Received () 14 CONSENT    Consent for EHR Access Received 14 EHR ACCESS    Privacy Notice - [facility] Received 14 PRIVACY NOTICE    Consent to Communicate   AUTHORIZATION TO DISCUSS PROTECTED HEALTH INFORMATION    HIM LASHAUN Authorization  04/30/15 [facility]    Consent for Services - Hospital/Clinic Received () 05/06/15 CONSENT    HIM LASHAUN Authorization  05/11/15 [facility]    HIM LASHAUN Authorization  11/11/15 [facility]    HIM LASHAUN Authorization - File Only  () 11/20/15 AUTHORIZATION FOR RELEASE OF PHI    HIM LASHAUN Authorization  11/24/15 [facility]    Business/Insurance/Care Coordination/Health Form - Patient   c    Consent for Services/Privacy Notice - Hospital/Clinic-Esign Received () 16     Consent for Services/Privacy Notice - Hospital/Clinic-Esign Received () 16     HIM LASHAUN Authorization  16  globe    HIM LASHAUN Authorization  05/05/16     Consent for Services/Privacy Notice - Hospital/Clinic       Insurance Card   need Platinum Blue card    HIM LASHAUN Authorization - File Only  03/14/17 AUTHORIZATION FOR RELEASE OF PROTECTED HEALTH INFORMATION    HIM LASHAUN Authorization  06/08/17     Consent for Services/Privacy Notice - Hospital/Clinic Received 07/28/17     Care Everywhere Prospective Auth Received 10/18/17     Advance Directives and Living Will Received 10/25/17 HEALTH CARE DIRECTIVE 04/19/2017    Advance Directives and Living Will Not Received  VALIDATION OF AD 04/19/2017    Privacy Notice - [place]  (Deleted)  PRIVACY NOTICE       Documents for the Encounter    CMS WALL for Patient Signature Received 12/22/17 Pt gave verbal understanding of WALL letter. Stated too difficult to sign.    Discharge Attachment   WEAKNESS (UNCERTAIN CAUSE) (ENGLISH)    Discharge Attachment   FALL, UNCERTAIN CAUSE (ENGLISH)    Discharge Attachment   KNEE PAIN (ENGLISH)      Admission Information     Attending Provider Admitting Provider Admission Type Admission Date/Time    Veronica Hood, Lamonte Rogers MD Emergency 12/21/17  1527    Discharge Date Hospital Service Auth/Cert Status Service Area     General Medicine Incomplete [facility] SERVICES    Unit Room/Bed Admission Status       [facility] SURGICAL 3234/3234-1 Admission (Confirmed)       Admission     Complaint    Physical deconditioning      Hospital Account     Name Acct ID Class Status Primary Coverage    Trey Washington [id number] Observation Open MEDICARE - MEDICARE FOR HB SUPPLEMENT            Guarantor Account (for Hospital Account #[id number])     Name Relation to Pt Service Area Active? Acct Type    Trey Washington Self [facility] Yes Personal/Family    Address Phone          [address] [place] [address] [phone](H)              Coverage Information (for Hospital Account #[id number])     1. MEDICARE/MEDICARE FOR HB  SUPPLEMENT     F/O Payor/Plan Precert #    MEDICARE/MEDICARE FOR HB SUPPLEMENT     Subscriber Subscriber #    Trey Washington [id number]    Address Phone    ATTN CLAIMS  [address]  [place] [address] [phone]          2. BCBS/BCBS PLATINUM BLUE     F/O Payor/Plan Precert #    BCBS/BCBS PLATINUM BLUE     Subscriber Subscriber #    PadminiTrey [id number]    Address Phone    [address] [address]  [place] [address] [phone]

## 2017-12-22 ENCOUNTER — APPOINTMENT (OUTPATIENT)
Dept: PHYSICAL THERAPY | Facility: HOSPITAL | Age: 82
End: 2017-12-22
Payer: MEDICARE

## 2017-12-22 VITALS
RESPIRATION RATE: 16 BRPM | BODY MASS INDEX: 45.1 KG/M2 | HEART RATE: 68 BPM | SYSTOLIC BLOOD PRESSURE: 195 MMHG | DIASTOLIC BLOOD PRESSURE: 112 MMHG | OXYGEN SATURATION: 94 % | WEIGHT: 315 LBS | HEIGHT: 70 IN | TEMPERATURE: 96.9 F

## 2017-12-22 LAB
EST. AVERAGE GLUCOSE BLD GHB EST-MCNC: 126 MG/DL
GLUCOSE BLDC GLUCOMTR-MCNC: 105 MG/DL (ref 70–99)
GLUCOSE BLDC GLUCOMTR-MCNC: 117 MG/DL (ref 70–99)
GLUCOSE BLDC GLUCOMTR-MCNC: 141 MG/DL (ref 70–99)

## 2017-12-22 PROCEDURE — 00000146 ZZHCL STATISTIC GLUCOSE BY METER IP

## 2017-12-22 PROCEDURE — A9270 NON-COVERED ITEM OR SERVICE: HCPCS | Mod: GY | Performed by: INTERNAL MEDICINE

## 2017-12-22 PROCEDURE — 25000132 ZZH RX MED GY IP 250 OP 250 PS 637: Mod: GY | Performed by: INTERNAL MEDICINE

## 2017-12-22 PROCEDURE — 99217 ZZC OBSERVATION CARE DISCHARGE: CPT | Performed by: NURSE PRACTITIONER

## 2017-12-22 PROCEDURE — 40000193 ZZH STATISTIC PT WARD VISIT

## 2017-12-22 PROCEDURE — A9270 NON-COVERED ITEM OR SERVICE: HCPCS | Mod: GY | Performed by: NURSE PRACTITIONER

## 2017-12-22 PROCEDURE — 97161 PT EVAL LOW COMPLEX 20 MIN: CPT | Mod: GP

## 2017-12-22 PROCEDURE — G0378 HOSPITAL OBSERVATION PER HR: HCPCS

## 2017-12-22 PROCEDURE — 25000132 ZZH RX MED GY IP 250 OP 250 PS 637: Mod: GY | Performed by: NURSE PRACTITIONER

## 2017-12-22 RX ORDER — KETOCONAZOLE 20 MG/G
CREAM TOPICAL 2 TIMES DAILY PRN
COMMUNITY

## 2017-12-22 RX ORDER — CAPTOPRIL 25 MG/1
50 TABLET ORAL 3 TIMES DAILY
Status: DISCONTINUED | OUTPATIENT
Start: 2017-12-22 | End: 2017-12-22

## 2017-12-22 RX ORDER — CAPTOPRIL 25 MG/1
50 TABLET ORAL
Status: DISCONTINUED | OUTPATIENT
Start: 2017-12-22 | End: 2017-12-22 | Stop reason: HOSPADM

## 2017-12-22 RX ORDER — TRAMADOL HYDROCHLORIDE 50 MG/1
50 TABLET ORAL EVERY 8 HOURS PRN
Qty: 30 TABLET | Refills: 3 | Status: SHIPPED | OUTPATIENT
Start: 2017-12-22 | End: 2018-02-19

## 2017-12-22 RX ADMIN — CAPTOPRIL 50 MG: 25 TABLET ORAL at 06:51

## 2017-12-22 RX ADMIN — SERTRALINE HYDROCHLORIDE 100 MG: 100 TABLET, FILM COATED ORAL at 09:24

## 2017-12-22 RX ADMIN — ASPIRIN 81 MG 81 MG: 81 TABLET ORAL at 09:24

## 2017-12-22 RX ADMIN — METOPROLOL TARTRATE 25 MG: 25 TABLET ORAL at 09:25

## 2017-12-22 RX ADMIN — DOCUSATE SODIUM 100 MG: 100 CAPSULE, LIQUID FILLED ORAL at 09:23

## 2017-12-22 RX ADMIN — CAPTOPRIL 50 MG: 25 TABLET ORAL at 12:17

## 2017-12-22 RX ADMIN — MICONAZOLE NITRATE: 20 POWDER TOPICAL at 11:15

## 2017-12-22 RX ADMIN — ALLOPURINOL 300 MG: 300 TABLET ORAL at 09:24

## 2017-12-22 RX ADMIN — POLYETHYLENE GLYCOL 3350 17 G: 17 POWDER, FOR SOLUTION ORAL at 09:23

## 2017-12-22 RX ADMIN — LEVETIRACETAM 1000 MG: 500 TABLET ORAL at 09:23

## 2017-12-22 NOTE — H&P
DATE OF ADMISSION:  12/21/2017      CHIEF COMPLAINT:  Left knee pain, inability to ambulate at home.      HISTORY OF PRESENT ILLNESS:  Mr. Trey Washington is an 82-year-old man with morbid obesity.  The patient recently fell and injured his left knee.  He was seen in the emergency room here on 12/17 which would be 4 days prior to presentation.  The patient was brought home.  The patient apparently had been lying in bed, unable to ambulate or do anything at home.  The patient has home care.  The patient came back to the emergency room today because of his inability to ambulate.  The patient was admitted to observation for further management.      PAST MEDICAL HISTORY:   1.  Morbid obesity.   2.  DM type 2.   3.  Osteoarthritis affecting hips.   4.  Obesity hypoventilation syndrome.   5.  Possible COPD.   6.  Hypertension.   7.  Hypercholesterolemia.   8.  Chronic renal failure, stage 3.   9.  Obstructive sleep apnea.   10.  Chronic CPAP at night.      MEDICATIONS ON ADMISSION INCLUDE:   1.  Tylenol 500 mg 2 pills each night at bedtime.   2.  Allopurinol 300 mg p.o. q. day.   3.  Vitamin C.   4.  Aspirin.   5.  Basaglar at night.   6.  Calcium carbonate.   7.  Captopril 50 mg p.o. t.i.d.   8.  Vitamin D.   9.  Vitamin B12.   10.  Doculase 100 mg p.o. b.i.d.   11.  Fish oil capsules.   12.  Nizoral cream.   13.  Lantus 18 units at night.   14.  Levetiracetam 1000 mg b.i.d.   15.  Metformin 1000 mg daily.   16.  Metoprolol 25 mg b.i.d.   17.  Miconazole 2% b.i.d.   18.  Miconazole powder.   19.  NovoLog with meals.   20.  Os-Morris.   21.  MiraLAX 17 grams q. day.   22.  Zoloft 100 mg p.o. q. day.   23.  Aquacel AG extra hydrofiber pads.   24.  Zocor 20 mg p.o. each night at bedtime.   25.  Hytrin 10 mg p.o. q. day.   26.  Ultram 50 mg p.o. q.8 hours.   27.  Vitamin E.      ADVERSE REACTIONS:  To hydrocodone and penicillins.      SOCIAL HISTORY:  The patient lives at home alone.  The patient was previously  and has  an adopted child.  Nonsmoker.  The patient has home care.      FAMILY HISTORY:  Not relevant to presenting complaint.      REVIEW OF SYSTEMS:  Other than discussed in the HPI above, noncontributory to comprehensive review.      PHYSICAL EXAMINATION:   VITAL SIGNS:  Blood pressure is 130s/80s, temperature is afebrile, oxygenation is 97% on 3 liters of oxygen, heart rate is 85-68.   GENERAL:  The patient is a morbidly obese man.   PSYCHIATRIC:  Very pleasant affect.   NEUROLOGICAL:  Alert and oriented and appropriate.  No focal deficit.   PRECORDIUM:  Distant heart sounds, regular rhythm, normal S1, normal S2.   LUNGS:  Distant lung sounds with normal lung sounds and no added lung sounds.   ABDOMEN:  Grossly adipose, soft.  Bowel sounds present.   EXTREMITIES:  No muscle wasting.   SKIN:  Small abrasion on the left knee over the patella.      RESULTS REVIEWED:  Included x-ray of the left knee done on 12/17 which shows a nonacute patellar fracture.      LABORATORY DATA:  Includes a CBC and a BMP.  BUN is 32, creatinine 1.36, otherwise unremarkable.      IMPRESSION:   1.  Morbid obesity, physical deconditioning which was made worse by his recent left knee injury.  Overall, patient has been having difficulty managing at home.  He may need escalated care after discharge.  Will engage  and PT to determine his discharge needs.   2.  Sleep apnea.  Will use oxygen for now.  Will try to obtain a CPAP machine for the night.  The patient will bring in his own machine tomorrow or have someone do it for him.   3.  Obesity is his biggest problem.  Patient encouraged to lose weight, exercise and diet.   4.  Left patellar fracture seems to be nonoperative.  No significant pain at present.   5.  Diabetes mellitus type 2.  Will do insulin sliding scale.   6.  Chronic kidney failure, stage 3.   7.  Prophylaxis for deep venous thrombosis.  Will do subcu heparin.      CODE STATUS:  Discussed tonight, DNR/DNI.          DEUCE ELENA MD             D: 2017 20:58   T: 2017 02:23   MT:       Name:     NHUNG MONTALVO   MRN:      [id number]        Account:      [id number]   :      1935           Admitted:     [id number]      Document: [id number]       cc: Moreno Desai MD

## 2017-12-22 NOTE — PROGRESS NOTES
Picked up hospital's CPAP. Left pt with mask, tubing and 02 tubing and adapter that he used here. He stated he would like to take it with him.

## 2017-12-22 NOTE — PLAN OF CARE
Pt. Stated that his BP is elevated due to him having to leave, and he is a little upset that he is getting further away from home.

## 2017-12-22 NOTE — DISCHARGE SUMMARY
[facility]    Discharge Summary  Hospitalist    Date of Admission:  12/21/2017  Date of Discharge:  12/22/2017  Discharging Provider: Veronica Hood CNP  Date of Service (when I saw the patient): 12/22/17    Discharge Diagnoses   Active Problems:    Chronic kidney disease, stage III (moderate)    Morbid (severe) obesity due to excess calories (H)    Obstructive sleep apnea    Controlled type 2 diabetes mellitus without complication, with long-term current use of insulin (H)    Type 2 diabetes mellitus with diabetic nephropathy, with long-term current use of insulin (H)    Essential hypertension with goal blood pressure less than 140/90    Chronic pain syndrome    Physical deconditioning      History of Present Illness    Mr. Trey Washington is an 82-year-old man with morbid obesity.  The patient recently fell and injured his left knee.  He was seen in the emergency room here on 12/17 which would be 4 days prior to presentation.  The patient was brought home.  The patient apparently had been lying in bed, unable to ambulate or do anything at home.  The patient has home care.  The patient came back to the emergency room today because of his inability to ambulate.  The patient was admitted to observation for further management.    Hospital Course   Trey Washington was admitted on 12/21/2017.  The following problems were addressed during his hospitalization:      Physical deconditioning: Patient has been failing at home for several weeks. He has a PCA that assists with cares at home. However, he fell out of bed approximately a week ago hurting his left knee and has been unable to get out of bed since then. He has been urinating via urinal and stooling using bed pads. He has been eating in bed and apparently eating okay. PCA became concerned about his continued weakness and inability to perform basic cares while she was not around. All was made to his PCA who recommend they come to the ED. On ED  evaluation he has not acute needs requiring hospitalization. He is discharged today to [facility] for short term rehab in hopes that he can get strong enough to return home.       Chronic kidney disease, stage III (moderate)    Morbid (severe) obesity due to excess calories (H)    Obstructive sleep apnea    Controlled type 2 diabetes mellitus without complication, with long-term current use of insulin (H)    Type 2 diabetes mellitus with diabetic nephropathy, with long-term current use of insulin (H)    Essential hypertension with goal blood pressure less than 140/90    Chronic pain syndrome      Veronica Hood, CNP    Code Status   DNR / DNI       Primary Care Physician   Moreno Desai        Discharge Disposition   Discharged to nursing home  Condition at discharge: Stable    Consultations This Hospital Stay   [facility] HEALTH SERVICES IP CONSULT  PHYSICAL THERAPY ADULT IP CONSULT  PHYSICAL THERAPY ADULT IP CONSULT  OCCUPATIONAL THERAPY ADULT IP CONSULT    Time Spent on this Encounter   I, Veronica Hood, personally saw the patient today and spent greater than 30 minutes discharging this patient.    Discharge Orders     General info for SNF   Length of Stay Estimate: Short Term Care: Estimated # of Days <30  Condition at Discharge: Stable  Level of care:skilled   Rehabilitation Potential: Fair  Admission H&P remains valid and up-to-date: Yes    Use Nursing Home Standing Orders: Yes     Reason for your hospital stay   Left knee pain after fall,progressive weakness     Glucose monitor nursing POCT   Before meals and at bedtime     Activity - Up with nursing assistance     Additional Discharge Instructions   CPAP at night with home settings: Pressure 11, EPR 1, 0 ramp time     DNR/DNI     Physical Therapy Adult Consult   Evaluate and treat as clinically indicated.    Reason:  eval and treat     Occupational Therapy Adult Consult   Evaluate and treat as clinically indicated.    Reason:  Eval and treat     Fall  precautions     Advance Diet as Tolerated   Follow this diet upon discharge: Orders Placed This Encounter     Moderate Consistent CHO Diet       Discharge Medications   Current Discharge Medication List      CONTINUE these medications which have CHANGED    Details   traMADol (ULTRAM) 50 MG tablet Take 1 tablet (50 mg) by mouth every 8 hours as needed for moderate pain  Qty: 30 tablet, Refills: 3    Associated Diagnoses: Type 2 diabetes mellitus with diabetic nephropathy, with long-term current use of insulin (H); Chronic pain syndrome; Physical deconditioning; Osteoarthrosis involving lower leg      insulin glargine (LANTUS SOLOSTAR) 100 UNIT/ML injection Inject 10 Units Subcutaneous At Bedtime    Associated Diagnoses: Type 2 diabetes mellitus with diabetic nephropathy, with long-term current use of insulin (H); Physical deconditioning      !! insulin aspart (NOVOLOG FLEXPEN) 100 UNIT/ML injection INJECT 8 UNITS SUBCUTANEOUS with lunch  Qty: 9 mL, Refills: 0    Associated Diagnoses: Type 2 diabetes mellitus with diabetic nephropathy, with long-term current use of insulin (H); Physical deconditioning      !! insulin aspart (NOVOLOG PEN) 100 UNIT/ML injection Inject 1-7 Units Subcutaneous 3 times daily (before meals) Do Not give Correction Insulin if Pre-Meal BG less than 140.   For Pre-Meal  - 189=1 unit.   For Pre-Meal  - 239=2 units.   For Pre-Meal  - 289=3 units.   For Pre-Meal  - 339=4 units.   For Pre-Meal - 399=5 units.   For Pre-Meal -449=6 units  For Pre-Meal BG >=450 give 7 units.   To be given with prandial insulin, and based on pre-meal blood glucose.    Associated Diagnoses: Type 2 diabetes mellitus with diabetic nephropathy, with long-term current use of insulin (H); Physical deconditioning       !! - Potential duplicate medications found. Please discuss with provider.      CONTINUE these medications which have NOT CHANGED    Details   ketoconazole (NIZORAL) 2 % cream  Apply topically 2 times daily as needed      miconazole (MICATIN; MICRO GUARD) 2 % powder Apply topically 2 times daily as needed      Captopril (CAPOTEN PO) Take 50 mg by mouth 3 times daily      simvastatin (ZOCOR) 10 MG tablet TAKE 1 TABLET BY MOUTH NIGHTLY AT BEDTIME  Qty: 90 tablet, Refills: 0    Comments: This prescription was filled on 12/12/2017. Any refills authorized will be placed on file.  Associated Diagnoses: Hypercholesterolemia      metFORMIN (GLUCOPHAGE) 1000 MG tablet TAKE 1 TABLET BY MOUTH EVERY DAY WITH A MEAL  Qty: 90 tablet, Refills: 1    Associated Diagnoses: Diabetes mellitus, type 2 (H)      Oyster Shell Calcium (OS-MARILIN 500 MG [place]) 500 MG tablet TAKE 1 TABLET BY MOUTH TWICE DAILY  Qty: 180 tablet, Refills: 2    Associated Diagnoses: HTN (hypertension)      terazosin (HYTRIN) 10 MG capsule TAKE 1 CAPSULE BY MOUTH NIGHTLY AT BEDTIME  Qty: 90 capsule, Refills: 2    Associated Diagnoses: Essential hypertension      polyethylene glycol (MIRALAX/GLYCOLAX) powder TAKE 17 GRAMS BY MOUTH DAILY MIXED AS DIRECTED.  Qty: 527 g, Refills: 8    Comments: This prescription was filled on 8/28/2017. Any refills authorized will be placed on file.  Associated Diagnoses: Constipation      allopurinol (ZYLOPRIM) 300 MG tablet Take 1 tablet (300 mg) by mouth daily  Qty: 90 tablet, Refills: 3    Associated Diagnoses: Idiopathic gout, unspecified chronicity, unspecified site      levETIRAcetam 1000 MG TABS TAKE 1 TABLET BY MOUTH TWICE DAILY  Qty: 60 tablet, Refills: 5    Associated Diagnoses: Seizure disorder (H)      Cholecalciferol (VITAMIN D HIGH POTENCY) 1000 UNITS CAPS TAKE 1 CAPSULE BY MOUTH DAILY  Qty: 90 capsule, Refills: 3    Comments: This prescription was filled on 8/7/2017. Any refills authorized will be placed on file.  Associated Diagnoses: Vitamin D deficiency      DOCQLACE 100 MG capsule TAKE 1 CAPSULE BY MOUTH TWICE DAILY  Qty: 100 capsule, Refills: 1    Comments: This prescription was filled  "on 7/24/2017. Any refills authorized will be placed on file.  Associated Diagnoses: Constipation      cyanocolbalamin (VITAMIN  B-12) 500 MCG tablet TAKE 2 TABLETS (1,000 MCG) BY MOUTH DAILY  Qty: 180 tablet, Refills: 1    Associated Diagnoses: Vitamin deficiency      fish oil-omega-3 fatty acids 1000 MG capsule Take 1,000 mg by mouth daily      sertraline (ZOLOFT) 100 MG tablet TAKE 1 TABLET BY MOUTH ONCE DAILY  Qty: 90 tablet, Refills: 3    Associated Diagnoses: Dysthymia      Acetaminophen (TYLENOL PO) Take 1,000 mg by mouth At Bedtime       Silver-Carboxymethylcellulose (AQUACEL-AG EXTRA HYDROFIBER) 4\"X5\" PADS Externally apply 0.25 each topically every other day Use as directed by wound center  Qty: 5 each, Refills: 3    Comments: Ok to substitute any silver hydrofiber/gelling fiber.  Associated Diagnoses: Open wound of abdomen      Ascorbic Acid (VITAMIN C PO) Take 500 mg by mouth daily       vitamin E (TOCOPHEROL) 100 UNIT capsule Take 100 Units by mouth daily Uncertain of dose      aspirin 81 MG tablet Take 1 tablet by mouth daily      blood glucose monitoring (KEO MICROLET) lancets USE AS DIRECTED TESTING BLOOD SUGAR TWICE DAILY OR AS DIRECTED  Qty: 200 each, Refills: 1    Associated Diagnoses: IDDM (insulin dependent diabetes mellitus) (H)      Lancets Misc. (UNISTIK 3 NORMAL) MISC USE TO TEST BLOOD SUGARS TWO TIMES A DAY  Qty: 100 each, Refills: 3    Associated Diagnoses: Diabetes mellitus, type 2 (H)      NOVOFINE 30G X 8 MM insulin pen needle USE 4 PENS NEEDLES DAILY OR AS DIRECTED  Qty: 100 each, Refills: 6    Comments: This prescription was filled on 7/6/2017. Any refills authorized will be placed on file.  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  Qty: 1 kit, Refills: 0    Associated Diagnoses: IDDM " (insulin dependent diabetes mellitus) (H)      blood glucose monitoring ([employer] CONTOUR) test strip Use to test blood sugars 2 times daily or as directed.  Qty: 200 each, Refills: 3    Associated Diagnoses: IDDM (insulin dependent diabetes mellitus) (H)      !! order for DME 4x4 bulk SOFT gauze (disp 1 sleeve, refill x 5)  Qty: 1 Units, Refills: 0    Associated Diagnoses: Ulcer of skin (H)      !! order for DME Equipment being ordered: Meplilex Transfer 6 X 8 inch ([id number]) - disp 3;  Aquacel AG 4 X 5 ([id number]); disp 3 - Refills 6  Qty: 1 each, Refills: 6    Associated Diagnoses: Skin ulcer, limited to breakdown of skin (H)      !! order for DME Equipment being ordered: Oxygen 2 LPM per nasal cannula during the day, portable also  Qty: 1 each, Refills: 11    Associated Diagnoses: Hypoxia      !! order for DME Equipment being ordered: Wheelchair  Qty: 1 each, Refills: 0    Associated Diagnoses: Morbid obesity, unspecified obesity type (H)      Elastic Bandages & Supports (T.E.D. BELOW KNEE/L-REGULAR) MISC Apply in am and take off in the evening  Qty: 6 each, Refills: 11    Associated Diagnoses: Swelling of limb      UNABLE TO FIND CPAP       !! - Potential duplicate medications found. Please discuss with provider.      STOP taking these medications       METOPROLOL TARTRATE PO Comments: patient reports he was taken off this due to hypotension  Reason for Stopping:         miconazole (MICATIN) 2 % AERP Comments:   Reason for Stopping:             Allergies   Allergies   Allergen Reactions     Hydrocodone      Intolerance       Penicillins Swelling     Data   Most Recent 3 CBC's:  Recent Labs   Lab Test  12/21/17   1624  12/17/17   1249  07/28/17   1904   WBC  5.9  5.8  7.7   HGB  12.9*  13.3  13.0*   MCV  98  98  95   PLT  160  132*  149*      Most Recent 3 BMP's:  Recent Labs   Lab Test  12/21/17   1624  12/17/17   1249  10/18/17   0933   NA  142  141  140   POTASSIUM  4.4  4.3  4.1   CHLORIDE  106  107  104   CO2  28   26  28   BUN  32*  42*  41*   CR  1.36*  1.74*  1.77*   ANIONGAP  8  8  8   MARILIN  9.4  8.6  9.5   GLC  139*  142*  113*     Most Recent 2 LFT's:  Recent Labs   Lab Test  12/21/17   1624  03/11/17   1900   AST  36  14   ALT  23  12   ALKPHOS  52  41   BILITOTAL  0.3  0.5     Most Recent INR's and Anticoagulation Dosing History:  Anticoagulation Dose History     Recent Dosing and Labs Latest Ref Rng & Units 3/4/2017    INR 0.80 - 1.20 1.12        Most Recent 3 Troponin's:  Recent Labs   Lab Test  03/11/17   1900  09/15/15   1725   TROPI  0.032  <0.015  The 99th percentile for upper reference range is 0.045 ug/L.  Troponin values in   the range of 0.045 - 0.120 ug/L may be associated with risks of adverse   clinical events.       Most Recent Cholesterol Panel:  Recent Labs   Lab Test  04/03/17   0944   CHOL  182   LDL  103*   HDL  48   TRIG  155*     Most Recent 6 Bacteria Isolates From Any Culture (See EPIC Reports for Culture Details):  Recent Labs   Lab Test  12/21/17   2115  07/29/17   0025  11/20/15   0951  01/07/14   1145   CULT  Culture in progress  No MRSA isolated  No growth after 7 days  50,000 to 100,000 colonies/mL Proteus mirabilis*     Most Recent TSH, T4 and A1c Labs:  Recent Labs   Lab Test  12/21/17   1623   03/11/17   1900  10/05/16   0944   TSH   --    --   1.93  5.19*   T4   --    --    --   0.93   A1C  6.0   < >   --   6.1*    < > = values in this interval not displayed.     Results for orders placed or performed during the hospital encounter of 12/21/17   XR Chest Port 1 View    Narrative    XR CHEST PORT 1 VW    HISTORY: 82 yearsMale weakness;     TECHNIQUE: A single view of the chest was performed    COMPARISON: 9/15/2015    FINDINGS: There is chronic mediastinal shift to the right. Mediastinal  elements obscure optimal visualization of the right lung. These  changes were present on the prior study. The upper right lung and left  lung are clear. There is advanced arthritic change of both  shoulders.      Impression    IMPRESSION: No significant interval change from the prior study.    MICHAEL RIGGS MD

## 2017-12-22 NOTE — PLAN OF CARE
Problem: Patient Care Overview  Goal: Plan of Care/Patient Progress Review  -diagnostic tests and consults completed and resulted   -vital signs normal or at patient baseline  Outcome: No Change   12/21/17 6315   OTHER   Plan Of Care Reviewed With patient   Plan of Care Review   Progress no change     VSS, on 3 LPM O2 as home schedule. Alert, oriented, and using call light appropriately. Lungs clear, BS active. Assist of 2 with angela to transfer in bed. Clothing removed, smelling of urine. Assisted in use of urinal. RT paged to set up CPAP. Abd has several scars, inguinal bulge present. No IV present. Unable to complete med list as pt has family friend set them up for him and does not know home medications. HS   Face to face report given with opportunity to observe patient. Abrasion present L knee, no drainage. Pt refusing to turn, stating he wants to stay on R side    Report given to LNO Lombardi   12/21/2017  11:00 PM

## 2017-12-22 NOTE — PROGRESS NOTES
Patient aware that [facility] had been screening patient, as well as [facility] and [facility]. Patient also aware that MA application would need to be filled out and signed in order for them to accept patient. Patient stated that he will not sign the application. He is aware that he would be need to pay privately for facility in order to be accepted with no MA application. Patient states he does not have the funds to pay for nursing home.     Spoke with patient's caregiver Adri, Adri shares she has been taking care of patient for over 5 years. She also agrees with the  that he should not sign any paperwork. Adri and patient both shared that patient is in the process of creating a Trust fund for patient's daughter who is in a group home.     Spoke with Magali at [facility], she is aware that patient is not wanting to sign application until he talks with his . Magali at [facility] is okay with patient coming without signing application she asked for orders to be faxed and transportation to be arranged soon.     [facility] -   Spoke to [facility] for possible transportation.   [facility] unavailable to get patient today.

## 2017-12-22 NOTE — PROGRESS NOTES
Name: Trey Washington    MRN#: [id number]    Reason for Hospitalization: Weakness [R53.1]  Morbid (severe) obesity due to excess calories (H) [E66.01]    Discharge Date: 12/21/2017    Patient / Family response to discharge plan: agreeable     Follow-Up Appt: No future appointments.    Other Providers (Care Coordinator, County Services, PCA services etc): Yes: Patient's caregiver Elen updated     Discharge Disposition: nursing home - [facility]    Patient is agreeable to going to [facility], [facility] able to provide transportation at 2:00.   Patient's nurse Rika updated, and hospitalist Veronica Hood updated.     Discharge orders faxed to [facility] and [facility].    Jaqueline Gonzáles      PAS-RR    Per DHS regulation, CTS team completed and submitted PAS-RR to [place] [employer]ging Direct Connect via the [employer] [facility]. CTS team advised SNF and they are aware a PAS-RR has been submitted.     CTS team reviewed with pt or health care agent that they may be contacted for a follow up appointment within 10 days of hospital discharge if SNF stay is <30 days. Contact information for [employer] was also provided.     Pt or health care agent verbalized understanding.     PAS-RR # [id number]

## 2017-12-22 NOTE — PROGRESS NOTES
[facility] - checking to see if they are able to accept MA pending.   [facility] - message left  [facility] - No beds  [facility] - Do not take MA pending  [facility] - No beds    [facility] - information faxed, [facility] looking in to see if they are able to take MA pending.

## 2017-12-22 NOTE — PROGRESS NOTES
12/22/17 1300   Quick Adds   Type of Visit Initial PT Evaluation   Living Environment   Lives With alone   Living Arrangements house   Home Accessibility stairs to enter home   Number of Stairs to Enter Home 3   Number of Stairs Within Home 0   Living Environment Comment Pt reports he has PCA services in the morning and evening   Self-Care   Usual Activity Tolerance fair   Current Activity Tolerance poor   Regular Exercise no   Equipment Currently Used at Home walker, rolling;oxygen   Functional Level Prior   Ambulation 1-->assistive equipment   Transferring 1-->assistive equipment   Toileting 1-->assistive equipment   Fall history within last six months yes   Number of times patient has fallen within last six months 1   Which of the above functional risks had a recent onset or change? ambulation;transferring;fall history   Prior Functional Level Comment Pt reports he fell out of bed last week. Report since then he has had trouble getting out of bed or walking at home. Reports he does not leave his house during the winter. Has PCA services in morning and evening   General Information   Onset of Illness/Injury or Date of Surgery - Date 12/21/17   Referring Physician Veronica Hood NP   Pertinent History of Current Problem (include personal factors and/or comorbidities that impact the POC) Pt admitted due to difficulty c walking after a fall. PMH includes morbid obesity, DM type II, OA, COPD, HTN, renal failure   Precautions/Limitations fall precautions   Weight-Bearing Status - LLE full weight-bearing   Weight-Bearing Status - RLE full weight-bearing   Cognitive Status Examination   Orientation orientation to person, place and time   Level of Consciousness alert   Follows Commands and Answers Questions 100% of the time   Personal Safety and Judgment intact   Memory intact   Pain Assessment   Patient Currently in Pain No   Posture    Posture Not impaired   Range of Motion (ROM)   ROM Quick Adds No deficits were  identified   Strength   Strength Comments MMT not performed, function weakness demonstrated   Bed Mobility   Bed Mobility Bed mobility skill: Rolling/Turning   Bed Mobility Skill: Rolling/Turning   Level of [place]: Rolling/Turning moderate assist (50% patients effort)   Physical/Nonphysical Assist: Rolling/Turning 1 person assist   Assistive Device: Rolling/Turning bed rails   Transfer Skills   Transfer Transfer Skill: Sit to Stand;Transfer Skill: Bed to Chair/Chair to Bed   Transfer Skill: Bed/Chair   Level of [place]: Bed/Chair dependent (less than 25% patients effort)   Physical/Nonphysical Assist: Bed/Chair 2 persons   Assistive Device: Bed/Chair (Dahlia lift)   Transfer Skill:  Sit to Stand   Level of [place]: Sit/Stand minimum assist (75% patients effort)   Physical Assist/Nonphysical Assist: Sit/Stand 2 persons   Weightbearing Restrictions: Sit/Stand full weight-bearing   Assistive Device for Transfer: Sit/Stand rolling walker   Gait   Gait Comments Unable   Sensory Examination   Sensory Perception no deficits were identified   Coordination   Coordination no deficits were identified   Muscle Tone   Muscle Tone no deficits were identified   Clinical Impression   Criteria for Skilled Therapeutic Intervention evaluation only  (Pt is d/c today to SNF)   PT Diagnosis Weakness, difficulty c walking   Influenced by the following impairments Weakness, difficutly c walking   Functional limitations due to impairments Decreased safety and independence c functional mobiity   Clinical Presentation Stable/Uncomplicated   Clinical Presentation Rationale Currently stable   Clinical Decision Making (Complexity) Low complexity   Predicted Duration of Therapy Intervention (days/wks) today   Anticipated Discharge Disposition Transitional Care Facility   Risk & Benefits of therapy have been explained Yes   Patient, Family & other staff in agreement with plan of care Yes   Clinical Impression Comments Pt will need  "placement for short term rehab. Pt is d/c today to [facility].    [facility] AM-PAC TM \"6 Clicks\"   2016, Trustees of [facility], under license to [employer].  All rights reserved.   6 Clicks Short Forms Basic Mobility Inpatient Short Form   [facility] AM-PAC  \"6 Clicks\" V.2 Basic Mobility Inpatient Short Form   1. Turning from your back to your side while in a flat bed without using bedrails? 2 - A Lot   2. Moving from lying on your back to sitting on the side of a flat bed without using bedrails? 2 - A Lot   3. Moving to and from a bed to a chair (including a wheelchair)? 2 - A Lot   4. Standing up from a chair using your arms (e.g., wheelchair, or bedside chair)? 2 - A Lot   5. To walk in hospital room? 1 - Total   6. Climbing 3-5 steps with a railing? 1 - Total   Basic Mobility Raw Score (Score out of 24.Lower scores equate to lower levels of function) 10   Total Evaluation Time   Total Evaluation Time (Minutes) 15     I certify the need for these services furnished under this plan of treatment and while under my care. (Physician co-signature of this document indicates review and certification of the therapy plan).      "

## 2017-12-22 NOTE — PHARMACY-ADMISSION MEDICATION HISTORY
I received a fax of the last 5 months of dispense history from [facility] in [place], [place]. There are some discrepancies with the current PTA med list and this list from [facility].  I left a message for patient's emergency contact and PCA, Adri [phone] to have her call me back in the pharmacy to ask about the medications in question: acetaminophen, ascorbic acid, aspirin, captopril, ketoconazole, metoprolol, fish oil, aquacel, and vitamin E.    Judy Brown, PharmD  December 22, 2017     I spoke with Adri briefly.  She was able to clarify questions on most medications, though she didn't have his med list in front of her. I am still uncertain on captopril dose. Pharmacy lists 100 mg three times daily, but there was a note in PTA med list (I'm not sure where it pulled from) that dose is 50 mg three times daily.    Judy Brown, PharmSILVIA  December 22, 2017

## 2017-12-22 NOTE — PLAN OF CARE
Patient discharged at 3:34 PM via wheel chair accompanied by staff. Prescriptions sent with patient to fill . All belongings sent with patient.     Discharge instructions reviewed with magali RN with [facility] ([facility] Listed belongings gathered and returned to patient. yes    Patient discharged to skilled nursing facility.   Report called to Nursing Home:  [facility] spoke with Magali RN    Core Measures and Vaccines  Core Measures applicable during stay: N/A. If yes, state diagnosis: N/A  Pneumonia and Influenza given prior to discharge, if indicated: N/A    Surgical Patient   Surgical Procedures during stay: N/A  Did patient receive discharge instruction on wound care and recognition of infection symptoms? N/A    MISC  Follow up appointment made:  No  Home and hospital aquired medications returned to patient: N/A  Patient reports pain was well managed at discharge: Yes

## 2017-12-22 NOTE — PROGRESS NOTES
Set up hosp supplied CPAP at 11 cmH2o.  3.5 lpm O2 bled in.  Pt provided this info. Used large nasal mask.Pt appears comfortable.  Skin intact before mask on.

## 2017-12-22 NOTE — PLAN OF CARE
[facility] clinic in [place] was called to verify CPAP setting, their recommendation was to call pulmonology in this facility. Due to the fact that they were unaware of correct settings. Patient was instructed to bring his own CPAP to his accepting facility.

## 2017-12-22 NOTE — PLAN OF CARE
Patient stated his CPAP setting from home id 11, RT was called to verify, and we are not sure the ordered CPAP setting parameter, this writer will call Dr. Desai with [facility] clinic in [place] to attempt to clarify.

## 2017-12-22 NOTE — PROGRESS NOTES
Patient gets his supplies from [facility]. I contacted them and they confirmed CPAP settings: Pressure 11, EPR 1, zero ramp time.

## 2017-12-22 NOTE — PLAN OF CARE
[facility] Inpatient Admission Note:    Patient admitted to 3234/3234-1 at approximately 1948 via bed accompanied by transport tech from emergency room . Report received from Laura in SBAR format at 1935 via telephone. Patient transferred to bed via angela.. Patient is alert and oriented X 3, denies pain; rates at 0 on 0-10 scale.  Patient oriented to room, unit, hourly rounding, and plan of care. Explained admission packet and patient handbook with patient bill of rights brochure. Will continue to monitor and document as needed.     Inpatient Nursing criteria listed below was met:      Health care directives status obtained and documented: Yes      Care Everywhere authorization obtained No      MRSA swab completed for patient 65 years and older: Yes      Patient identifies a surrogate decision maker: Yes If yes, who: Adri, family friend Contact Information: [phone]      Core Measure diagnosis present:No. If yes, state diagnosis: N/A       Is initial lactic acid >2.0? No.       Vaccination assessment and education completed: Yes   Vaccinations received prior to admission: Pneumovax yes  Influenza(seasonal)  YES   Vaccination(s) ordered: not given today because UTD      Clergy visit ordered if patient requests: Yes      Skin issues/needs documented: Yes      Isolation Patient: no Education given, correct sign in place and documentation row added to PCS:  No      Fall Prevention Yes: Care plan updated, education given and documented, sticker and magnet in place: Yes      Care Plan initiated: Yes      Education Documented (including assessment): Yes      Patient has discharge needs : Yes If yes, please explain: unable to take care of self at home, needing further placement.

## 2017-12-22 NOTE — PROGRESS NOTES
Spoke with Pamela at [facility], Pamela stated that provider had looked over the referral and does not think that patient would meet the rehab criteria.     Message left for Magali at [facility] for possible placement.  [facility] stated they would see if they are willing to take patient MA pending.   Message left for patient's PCA, Adri.     Met with patient briefly this morning regarding MA application. Patient states that he will not sign any paperwork, he said his  told him not to sign any paper work.  He was agreeable to patient [facility] coming in to see him for screening of MA. Called Lyndsay at patient [facility], they will be up to see patient.

## 2017-12-22 NOTE — PLAN OF CARE
Nurse to nurse report given to Magali RN with [facility] in [facility].   English

## 2017-12-22 NOTE — PLAN OF CARE
"Problem: Patient Care Overview  Goal: Plan of Care/Patient Progress Review  -diagnostic tests and consults completed and resulted   -vital signs normal or at patient baseline   Outcome: No Change  Reason for hospital stay:  Physical Deconditioning    Most recent vitals: /58  Pulse 68  Temp 97.9  F (36.6  C) (Tympanic)  Resp 16  Ht 1.778 m (5' 10\")  Wt (!) 164.1 kg (361 lb 12.4 oz)  SpO2 97%  BMI 51.91 kg/m2  Pain Management:  Denies pain  Orientation:  A&O  Cardiac:  HR grupo 56  Respiratory:  LS diminished. Cpap on and set up by RT with 3.5 LPM O2 bled in. Pt reports he maybe able to get a friend to bring his personal Cpap to the hospital  GI:  BS active. Denies nausea. Large inguinal bulge  :  Voids with assistance into the urinal  Diet: CHO  Skin Issues:  Abrasion to left knee from previous fall at home. All skin folds cdi, no redness noted  IVF:  No IV  ABX:  None  Mobility:  A2 to TRQ2, with decline by Pt who favors his right side. Dahlia lift used with good tolerance by Pt. Wears wrist brace to right hand due to arthritis per Pt report.  Safety:  Call light within reach. Bed alarm active    Comments: BG at 0200 of 141    12/22/2017  5:20 AM  Maria C Porter    Face to face report given with opportunity to observe patient.    Report given to LON Meléndez   12/22/2017  7:39 AM    "

## 2017-12-23 LAB
BACTERIA SPEC CULT: NORMAL
SPECIMEN SOURCE: NORMAL

## 2018-01-01 ENCOUNTER — TELEPHONE (OUTPATIENT)
Dept: FAMILY MEDICINE | Facility: OTHER | Age: 83
End: 2018-01-01

## 2018-01-01 ENCOUNTER — MEDICAL CORRESPONDENCE (OUTPATIENT)
Dept: HEALTH INFORMATION MANAGEMENT | Facility: CLINIC | Age: 83
End: 2018-01-01

## 2018-01-01 ENCOUNTER — HOSPITAL ENCOUNTER (EMERGENCY)
Facility: HOSPITAL | Age: 83
Discharge: HOME | End: 2018-07-07
Attending: FAMILY MEDICINE | Admitting: FAMILY MEDICINE
Payer: MEDICARE

## 2018-01-01 ENCOUNTER — OFFICE VISIT (OUTPATIENT)
Dept: UROLOGY | Facility: OTHER | Age: 83
End: 2018-01-01
Attending: FAMILY MEDICINE
Payer: COMMERCIAL

## 2018-01-01 ENCOUNTER — APPOINTMENT (OUTPATIENT)
Dept: CT IMAGING | Facility: HOSPITAL | Age: 83
End: 2018-01-01
Attending: FAMILY MEDICINE
Payer: MEDICARE

## 2018-01-01 ENCOUNTER — OFFICE VISIT (OUTPATIENT)
Dept: FAMILY MEDICINE | Facility: OTHER | Age: 83
End: 2018-01-01
Attending: FAMILY MEDICINE
Payer: MEDICARE

## 2018-01-01 ENCOUNTER — TRANSFERRED RECORDS (OUTPATIENT)
Dept: HEALTH INFORMATION MANAGEMENT | Facility: CLINIC | Age: 83
End: 2018-01-01

## 2018-01-01 ENCOUNTER — OFFICE VISIT (OUTPATIENT)
Dept: FAMILY MEDICINE | Facility: OTHER | Age: 83
End: 2018-01-01
Attending: FAMILY MEDICINE
Payer: COMMERCIAL

## 2018-01-01 VITALS
SYSTOLIC BLOOD PRESSURE: 118 MMHG | TEMPERATURE: 97.6 F | WEIGHT: 315 LBS | HEART RATE: 83 BPM | DIASTOLIC BLOOD PRESSURE: 72 MMHG | BODY MASS INDEX: 45.1 KG/M2 | HEIGHT: 70 IN | OXYGEN SATURATION: 88 %

## 2018-01-01 VITALS
BODY MASS INDEX: 51.65 KG/M2 | TEMPERATURE: 97.6 F | SYSTOLIC BLOOD PRESSURE: 124 MMHG | OXYGEN SATURATION: 91 % | WEIGHT: 315 LBS | HEART RATE: 93 BPM | DIASTOLIC BLOOD PRESSURE: 78 MMHG

## 2018-01-01 VITALS
TEMPERATURE: 98.1 F | SYSTOLIC BLOOD PRESSURE: 174 MMHG | DIASTOLIC BLOOD PRESSURE: 74 MMHG | HEART RATE: 77 BPM | OXYGEN SATURATION: 100 % | RESPIRATION RATE: 16 BRPM

## 2018-01-01 VITALS
RESPIRATION RATE: 24 BRPM | BODY MASS INDEX: 45.1 KG/M2 | HEART RATE: 78 BPM | WEIGHT: 315 LBS | HEIGHT: 70 IN | SYSTOLIC BLOOD PRESSURE: 120 MMHG | TEMPERATURE: 98.2 F | DIASTOLIC BLOOD PRESSURE: 72 MMHG | OXYGEN SATURATION: 97 %

## 2018-01-01 DIAGNOSIS — Z23 NEED FOR PROPHYLACTIC VACCINATION AND INOCULATION AGAINST INFLUENZA: ICD-10-CM

## 2018-01-01 DIAGNOSIS — G47.33 OBSTRUCTIVE SLEEP APNEA: ICD-10-CM

## 2018-01-01 DIAGNOSIS — Z01.818 PREOP GENERAL PHYSICAL EXAM: Primary | ICD-10-CM

## 2018-01-01 DIAGNOSIS — Z79.4 TYPE 2 DIABETES MELLITUS WITH DIABETIC NEPHROPATHY, WITH LONG-TERM CURRENT USE OF INSULIN (H): ICD-10-CM

## 2018-01-01 DIAGNOSIS — E11.21 TYPE 2 DIABETES MELLITUS WITH DIABETIC NEPHROPATHY, WITH LONG-TERM CURRENT USE OF INSULIN (H): ICD-10-CM

## 2018-01-01 DIAGNOSIS — K42.9 UMBILICAL HERNIA WITHOUT OBSTRUCTION AND WITHOUT GANGRENE: ICD-10-CM

## 2018-01-01 DIAGNOSIS — I10 HTN (HYPERTENSION): ICD-10-CM

## 2018-01-01 DIAGNOSIS — K59.00 CONSTIPATION: ICD-10-CM

## 2018-01-01 DIAGNOSIS — N40.0 BENIGN PROSTATIC HYPERPLASIA, UNSPECIFIED WHETHER LOWER URINARY TRACT SYMPTOMS PRESENT: ICD-10-CM

## 2018-01-01 DIAGNOSIS — G89.4 CHRONIC PAIN SYNDROME: Chronic | ICD-10-CM

## 2018-01-01 DIAGNOSIS — I10 ESSENTIAL HYPERTENSION: ICD-10-CM

## 2018-01-01 DIAGNOSIS — G89.4 CHRONIC PAIN SYNDROME: Primary | ICD-10-CM

## 2018-01-01 DIAGNOSIS — R53.81 PHYSICAL DECONDITIONING: ICD-10-CM

## 2018-01-01 DIAGNOSIS — E11.9 DM2 (DIABETES MELLITUS, TYPE 2) (H): Primary | ICD-10-CM

## 2018-01-01 DIAGNOSIS — G40.909 SEIZURE DISORDER (H): ICD-10-CM

## 2018-01-01 DIAGNOSIS — M25.551 RIGHT HIP PAIN: ICD-10-CM

## 2018-01-01 DIAGNOSIS — E78.00 HYPERCHOLESTEROLEMIA: ICD-10-CM

## 2018-01-01 DIAGNOSIS — G47.33 OSA (OBSTRUCTIVE SLEEP APNEA): Primary | ICD-10-CM

## 2018-01-01 DIAGNOSIS — M10.00 IDIOPATHIC GOUT, UNSPECIFIED CHRONICITY, UNSPECIFIED SITE: ICD-10-CM

## 2018-01-01 DIAGNOSIS — R33.9 URINARY RETENTION: Primary | ICD-10-CM

## 2018-01-01 DIAGNOSIS — R33.9 URINARY RETENTION: ICD-10-CM

## 2018-01-01 DIAGNOSIS — E66.2 HYPOVENTILATION ASSOCIATED WITH OBESITY SYNDROME (H): ICD-10-CM

## 2018-01-01 DIAGNOSIS — Z79.4 CONTROLLED TYPE 2 DIABETES MELLITUS WITHOUT COMPLICATION, WITH LONG-TERM CURRENT USE OF INSULIN (H): ICD-10-CM

## 2018-01-01 DIAGNOSIS — E55.9 VITAMIN D DEFICIENCY: ICD-10-CM

## 2018-01-01 DIAGNOSIS — S31.109D OPEN WOUND OF ABDOMEN, SUBSEQUENT ENCOUNTER: ICD-10-CM

## 2018-01-01 DIAGNOSIS — I10 ESSENTIAL HYPERTENSION WITH GOAL BLOOD PRESSURE LESS THAN 140/90: ICD-10-CM

## 2018-01-01 DIAGNOSIS — K42.1 UMBILICAL HERNIA WITH GANGRENE: ICD-10-CM

## 2018-01-01 DIAGNOSIS — J96.11 CHRONIC RESPIRATORY FAILURE WITH HYPOXIA (H): ICD-10-CM

## 2018-01-01 DIAGNOSIS — E11.9 CONTROLLED TYPE 2 DIABETES MELLITUS WITHOUT COMPLICATION, WITH LONG-TERM CURRENT USE OF INSULIN (H): ICD-10-CM

## 2018-01-01 LAB
ALBUMIN SERPL-MCNC: 3.2 G/DL (ref 3.4–5)
ALP SERPL-CCNC: 47 U/L (ref 40–150)
ALT SERPL W P-5'-P-CCNC: 13 U/L (ref 0–70)
ANION GAP SERPL CALCULATED.3IONS-SCNC: 10 MMOL/L (ref 3–14)
ANION GAP SERPL CALCULATED.3IONS-SCNC: 11 MMOL/L (ref 3–14)
AST SERPL W P-5'-P-CCNC: 11 U/L (ref 0–45)
BASOPHILS # BLD AUTO: 0 10E9/L (ref 0–0.2)
BASOPHILS NFR BLD AUTO: 0.6 %
BILIRUB SERPL-MCNC: 0.3 MG/DL (ref 0.2–1.3)
BUN SERPL-MCNC: 32 MG/DL (ref 7–30)
BUN SERPL-MCNC: 34 MG/DL (ref 7–30)
CALCIUM SERPL-MCNC: 8.9 MG/DL (ref 8.5–10.1)
CALCIUM SERPL-MCNC: 9 MG/DL (ref 8.5–10.1)
CHLORIDE SERPL-SCNC: 105 MMOL/L (ref 94–109)
CHLORIDE SERPL-SCNC: 109 MMOL/L (ref 94–109)
CO2 SERPL-SCNC: 22 MMOL/L (ref 20–32)
CO2 SERPL-SCNC: 25 MMOL/L (ref 20–32)
CREAT SERPL-MCNC: 1.52 MG/DL (ref 0.66–1.25)
CREAT SERPL-MCNC: 2.04 MG/DL (ref 0.66–1.25)
DIFFERENTIAL METHOD BLD: ABNORMAL
EOSINOPHIL # BLD AUTO: 0.1 10E9/L (ref 0–0.7)
EOSINOPHIL NFR BLD AUTO: 1 %
ERYTHROCYTE [DISTWIDTH] IN BLOOD BY AUTOMATED COUNT: 13.5 % (ref 10–15)
ERYTHROCYTE [DISTWIDTH] IN BLOOD BY AUTOMATED COUNT: 13.5 % (ref 10–15)
EST. AVERAGE GLUCOSE BLD GHB EST-MCNC: 114 MG/DL
GFR SERPL CREATININE-BSD FRML MDRD: 31 ML/MIN/1.7M2
GFR SERPL CREATININE-BSD FRML MDRD: 44 ML/MIN/1.7M2
GLUCOSE SERPL-MCNC: 132 MG/DL (ref 70–99)
GLUCOSE SERPL-MCNC: 166 MG/DL (ref 70–99)
HBA1C MFR BLD: 5.6 % (ref 0–5.6)
HCT VFR BLD AUTO: 37.4 % (ref 40–53)
HCT VFR BLD AUTO: 42.5 % (ref 40–53)
HGB BLD-MCNC: 12 G/DL (ref 13.3–17.7)
HGB BLD-MCNC: 13.3 G/DL (ref 13.3–17.7)
LACTATE SERPL-SCNC: 1.6 MMOL/L (ref 0.4–2)
LIPASE SERPL-CCNC: 120 U/L (ref 73–393)
LYMPHOCYTES # BLD AUTO: 1 10E9/L (ref 0.8–5.3)
LYMPHOCYTES NFR BLD AUTO: 19.6 %
MCH RBC QN AUTO: 32.3 PG (ref 26.5–33)
MCH RBC QN AUTO: 32.5 PG (ref 26.5–33)
MCHC RBC AUTO-ENTMCNC: 31.3 G/DL (ref 31.5–36.5)
MCHC RBC AUTO-ENTMCNC: 32.1 G/DL (ref 31.5–36.5)
MCV RBC AUTO: 101 FL (ref 78–100)
MCV RBC AUTO: 104 FL (ref 78–100)
MONOCYTES # BLD AUTO: 0.5 10E9/L (ref 0–1.3)
MONOCYTES NFR BLD AUTO: 10.6 %
NEUTROPHILS # BLD AUTO: 3.5 10E9/L (ref 1.6–8.3)
NEUTROPHILS NFR BLD AUTO: 68.2 %
PAIN DRUG SCR UR W RPTD MEDS: NORMAL
PLATELET # BLD AUTO: 129 10E9/L (ref 150–450)
PLATELET # BLD AUTO: 170 10E9/L (ref 150–450)
POTASSIUM SERPL-SCNC: 4.7 MMOL/L (ref 3.4–5.3)
POTASSIUM SERPL-SCNC: 4.8 MMOL/L (ref 3.4–5.3)
PROT SERPL-MCNC: 6.7 G/DL (ref 6.8–8.8)
RBC # BLD AUTO: 3.71 10E12/L (ref 4.4–5.9)
RBC # BLD AUTO: 4.09 10E12/L (ref 4.4–5.9)
SODIUM SERPL-SCNC: 140 MMOL/L (ref 133–144)
SODIUM SERPL-SCNC: 142 MMOL/L (ref 133–144)
WBC # BLD AUTO: 5.1 10E9/L (ref 4–11)
WBC # BLD AUTO: 8.5 10E9/L (ref 4–11)

## 2018-01-01 PROCEDURE — 40000788 ZZHCL STATISTIC ESTIMATED AVERAGE GLUCOSE: Mod: ZL | Performed by: FAMILY MEDICINE

## 2018-01-01 PROCEDURE — 25000128 H RX IP 250 OP 636: Performed by: FAMILY MEDICINE

## 2018-01-01 PROCEDURE — 85025 COMPLETE CBC W/AUTO DIFF WBC: CPT | Performed by: FAMILY MEDICINE

## 2018-01-01 PROCEDURE — 74177 CT ABD & PELVIS W/CONTRAST: CPT | Mod: TC

## 2018-01-01 PROCEDURE — 99000 SPECIMEN HANDLING OFFICE-LAB: CPT | Performed by: FAMILY MEDICINE

## 2018-01-01 PROCEDURE — 99285 EMERGENCY DEPT VISIT HI MDM: CPT | Performed by: FAMILY MEDICINE

## 2018-01-01 PROCEDURE — 83690 ASSAY OF LIPASE: CPT | Performed by: FAMILY MEDICINE

## 2018-01-01 PROCEDURE — 83036 HEMOGLOBIN GLYCOSYLATED A1C: CPT | Mod: ZL | Performed by: FAMILY MEDICINE

## 2018-01-01 PROCEDURE — G0463 HOSPITAL OUTPT CLINIC VISIT: HCPCS | Mod: 25

## 2018-01-01 PROCEDURE — 96361 HYDRATE IV INFUSION ADD-ON: CPT

## 2018-01-01 PROCEDURE — 99203 OFFICE O/P NEW LOW 30 MIN: CPT | Performed by: SURGERY

## 2018-01-01 PROCEDURE — 80053 COMPREHEN METABOLIC PANEL: CPT | Performed by: FAMILY MEDICINE

## 2018-01-01 PROCEDURE — G0008 ADMIN INFLUENZA VIRUS VAC: HCPCS | Performed by: FAMILY MEDICINE

## 2018-01-01 PROCEDURE — 99214 OFFICE O/P EST MOD 30 MIN: CPT | Mod: 25 | Performed by: FAMILY MEDICINE

## 2018-01-01 PROCEDURE — 93005 ELECTROCARDIOGRAM TRACING: CPT

## 2018-01-01 PROCEDURE — 36415 COLL VENOUS BLD VENIPUNCTURE: CPT | Mod: ZL | Performed by: FAMILY MEDICINE

## 2018-01-01 PROCEDURE — 99214 OFFICE O/P EST MOD 30 MIN: CPT | Performed by: FAMILY MEDICINE

## 2018-01-01 PROCEDURE — 80048 BASIC METABOLIC PNL TOTAL CA: CPT | Mod: ZL | Performed by: FAMILY MEDICINE

## 2018-01-01 PROCEDURE — 99203 OFFICE O/P NEW LOW 30 MIN: CPT | Performed by: UROLOGY

## 2018-01-01 PROCEDURE — 80307 DRUG TEST PRSMV CHEM ANLYZR: CPT | Mod: ZL | Performed by: FAMILY MEDICINE

## 2018-01-01 PROCEDURE — 83605 ASSAY OF LACTIC ACID: CPT | Performed by: FAMILY MEDICINE

## 2018-01-01 PROCEDURE — G0463 HOSPITAL OUTPT CLINIC VISIT: HCPCS

## 2018-01-01 PROCEDURE — 96375 TX/PRO/DX INJ NEW DRUG ADDON: CPT

## 2018-01-01 PROCEDURE — 90662 IIV NO PRSV INCREASED AG IM: CPT | Performed by: FAMILY MEDICINE

## 2018-01-01 PROCEDURE — 93010 ELECTROCARDIOGRAM REPORT: CPT | Performed by: INTERNAL MEDICINE

## 2018-01-01 PROCEDURE — 99285 EMERGENCY DEPT VISIT HI MDM: CPT | Mod: 25

## 2018-01-01 PROCEDURE — 85025 COMPLETE CBC W/AUTO DIFF WBC: CPT | Mod: ZL | Performed by: FAMILY MEDICINE

## 2018-01-01 PROCEDURE — 36416 COLLJ CAPILLARY BLOOD SPEC: CPT | Performed by: FAMILY MEDICINE

## 2018-01-01 PROCEDURE — 96374 THER/PROPH/DIAG INJ IV PUSH: CPT | Mod: XU

## 2018-01-01 RX ORDER — TRAMADOL HYDROCHLORIDE 50 MG/1
TABLET ORAL
Qty: 90 TABLET | Refills: 0 | Status: SHIPPED | OUTPATIENT
Start: 2018-01-01

## 2018-01-01 RX ORDER — MORPHINE SULFATE 2 MG/ML
2 INJECTION, SOLUTION INTRAMUSCULAR; INTRAVENOUS ONCE
Status: COMPLETED | OUTPATIENT
Start: 2018-01-01 | End: 2018-01-01

## 2018-01-01 RX ORDER — ALLOPURINOL 300 MG/1
1 TABLET ORAL DAILY
Qty: 90 TABLET | Refills: 0 | Status: SHIPPED | OUTPATIENT
Start: 2018-01-01 | End: 2019-01-01

## 2018-01-01 RX ORDER — CAPTOPRIL 50 MG/1
TABLET ORAL
Qty: 270 TABLET | Refills: 2 | Status: SHIPPED | OUTPATIENT
Start: 2018-01-01 | End: 2018-01-01

## 2018-01-01 RX ORDER — TRAMADOL HYDROCHLORIDE 50 MG/1
TABLET ORAL
Qty: 90 TABLET | Refills: 0 | Status: SHIPPED | OUTPATIENT
Start: 2018-01-01 | End: 2018-01-01

## 2018-01-01 RX ORDER — TRAMADOL HYDROCHLORIDE 50 MG/1
TABLET ORAL
Qty: 90 TABLET | Refills: 0 | Status: SHIPPED | OUTPATIENT
Start: 2018-01-01 | End: 2019-01-01

## 2018-01-01 RX ORDER — DOCUSATE SODIUM 100 MG/1
CAPSULE, LIQUID FILLED ORAL
Qty: 100 CAPSULE | Refills: 2 | Status: SHIPPED | OUTPATIENT
Start: 2018-01-01 | End: 2019-01-01

## 2018-01-01 RX ORDER — IOPAMIDOL 612 MG/ML
100 INJECTION, SOLUTION INTRAVASCULAR ONCE
Status: COMPLETED | OUTPATIENT
Start: 2018-01-01 | End: 2018-01-01

## 2018-01-01 RX ORDER — POLYETHYLENE GLYCOL 3350 17 G/17G
POWDER, FOR SOLUTION ORAL
Qty: 527 G | Refills: 3 | Status: SHIPPED | OUTPATIENT
Start: 2018-01-01 | End: 2019-01-01

## 2018-01-01 RX ORDER — ONDANSETRON 2 MG/ML
4 INJECTION INTRAMUSCULAR; INTRAVENOUS ONCE
Status: COMPLETED | OUTPATIENT
Start: 2018-01-01 | End: 2018-01-01

## 2018-01-01 RX ORDER — UREA 10 %
LOTION (ML) TOPICAL
Qty: 180 TABLET | Refills: 1 | Status: SHIPPED | OUTPATIENT
Start: 2018-01-01

## 2018-01-01 RX ORDER — SIMVASTATIN 10 MG
TABLET ORAL
Qty: 90 TABLET | Refills: 2 | Status: SHIPPED | OUTPATIENT
Start: 2018-01-01 | End: 2019-01-01

## 2018-01-01 RX ORDER — CHOLECALCIFEROL (VITAMIN D3) 25 MCG
CAPSULE ORAL
Qty: 90 CAPSULE | Refills: 2 | Status: SHIPPED | OUTPATIENT
Start: 2018-01-01

## 2018-01-01 RX ORDER — SULFAMETHOXAZOLE/TRIMETHOPRIM 800-160 MG
1 TABLET ORAL 2 TIMES DAILY
Qty: 60 TABLET | Refills: 0 | Status: SHIPPED | OUTPATIENT
Start: 2018-01-01 | End: 2018-01-01

## 2018-01-01 RX ORDER — SODIUM CHLORIDE 9 MG/ML
1000 INJECTION, SOLUTION INTRAVENOUS CONTINUOUS
Status: DISCONTINUED | OUTPATIENT
Start: 2018-01-01 | End: 2018-01-01 | Stop reason: HOSPADM

## 2018-01-01 RX ORDER — LEVETIRACETAM 1000 MG/1
TABLET ORAL
Qty: 60 TABLET | Refills: 2 | Status: SHIPPED | OUTPATIENT
Start: 2018-01-01 | End: 2019-01-01

## 2018-01-01 RX ORDER — INSULIN GLARGINE 100 [IU]/ML
INJECTION, SOLUTION SUBCUTANEOUS
Qty: 15 ML | Refills: 3 | Status: SHIPPED | OUTPATIENT
Start: 2018-01-01

## 2018-01-01 RX ORDER — POLYETHYLENE GLYCOL 3350 17 G/17G
POWDER, FOR SOLUTION ORAL
Qty: 527 G | Refills: 3 | Status: SHIPPED | OUTPATIENT
Start: 2018-01-01 | End: 2018-01-01

## 2018-01-01 RX ORDER — FOAM BANDAGE 4" X 4"
0.25 BANDAGE TOPICAL EVERY OTHER DAY
Qty: 5 EACH | Refills: 3 | Status: SHIPPED | OUTPATIENT
Start: 2018-01-01

## 2018-01-01 RX ORDER — LEVETIRACETAM 1000 MG/1
1 TABLET ORAL 2 TIMES DAILY
Qty: 60 TABLET | Refills: 1 | Status: SHIPPED | OUTPATIENT
Start: 2018-01-01 | End: 2018-01-01

## 2018-01-01 RX ORDER — CALCIUM CARBONATE 500(1250)
1 TABLET ORAL 2 TIMES DAILY
Qty: 180 TABLET | Refills: 2 | Status: SHIPPED | OUTPATIENT
Start: 2018-01-01 | End: 2019-01-01

## 2018-01-01 RX ORDER — TAMSULOSIN HYDROCHLORIDE 0.4 MG/1
CAPSULE ORAL
Qty: 90 CAPSULE | Refills: 3 | Status: SHIPPED | OUTPATIENT
Start: 2018-01-01 | End: 2019-01-01

## 2018-01-01 RX ORDER — TAMSULOSIN HYDROCHLORIDE 0.4 MG/1
0.4 CAPSULE ORAL
COMMUNITY
End: 2018-01-01

## 2018-01-01 RX ORDER — PEN NEEDLE, DIABETIC 31 GX5/16"
NEEDLE, DISPOSABLE MISCELLANEOUS
Qty: 200 EACH | Refills: 1 | Status: SHIPPED | OUTPATIENT
Start: 2018-01-01 | End: 2019-01-01

## 2018-01-01 RX ADMIN — ONDANSETRON 4 MG: 2 INJECTION, SOLUTION INTRAMUSCULAR; INTRAVENOUS at 13:22

## 2018-01-01 RX ADMIN — MORPHINE SULFATE 2 MG: 2 INJECTION, SOLUTION INTRAMUSCULAR; INTRAVENOUS at 13:19

## 2018-01-01 RX ADMIN — SODIUM CHLORIDE 1000 ML: 9 INJECTION, SOLUTION INTRAVENOUS at 12:59

## 2018-01-01 RX ADMIN — IOPAMIDOL 100 ML: 612 INJECTION, SOLUTION INTRAVENOUS at 14:21

## 2018-01-01 RX ADMIN — SODIUM CHLORIDE 1000 ML: 9 INJECTION, SOLUTION INTRAVENOUS at 14:42

## 2018-01-01 ASSESSMENT — ANXIETY QUESTIONNAIRES
2. NOT BEING ABLE TO STOP OR CONTROL WORRYING: NOT AT ALL
GAD7 TOTAL SCORE: 1
1. FEELING NERVOUS, ANXIOUS, OR ON EDGE: NOT AT ALL
3. WORRYING TOO MUCH ABOUT DIFFERENT THINGS: NOT AT ALL
7. FEELING AFRAID AS IF SOMETHING AWFUL MIGHT HAPPEN: NOT AT ALL
4. TROUBLE RELAXING: NOT AT ALL
GAD7 TOTAL SCORE: 0
7. FEELING AFRAID AS IF SOMETHING AWFUL MIGHT HAPPEN: NOT AT ALL
6. BECOMING EASILY ANNOYED OR IRRITABLE: NOT AT ALL
3. WORRYING TOO MUCH ABOUT DIFFERENT THINGS: NOT AT ALL
5. BEING SO RESTLESS THAT IT IS HARD TO SIT STILL: NOT AT ALL
6. BECOMING EASILY ANNOYED OR IRRITABLE: NOT AT ALL
2. NOT BEING ABLE TO STOP OR CONTROL WORRYING: SEVERAL DAYS
5. BEING SO RESTLESS THAT IT IS HARD TO SIT STILL: NOT AT ALL
1. FEELING NERVOUS, ANXIOUS, OR ON EDGE: NOT AT ALL
GAD7 TOTAL SCORE: 1
GAD7 TOTAL SCORE: 0

## 2018-01-01 ASSESSMENT — PAIN SCALES - GENERAL
PAINLEVEL: NO PAIN (0)

## 2018-01-01 ASSESSMENT — ENCOUNTER SYMPTOMS
ABDOMINAL PAIN: 1
DYSURIA: 0
FREQUENCY: 0
MUSCULOSKELETAL NEGATIVE: 1
VOMITING: 0
FEVER: 0
RECTAL PAIN: 1
FATIGUE: 0
SHORTNESS OF BREATH: 0
NEUROLOGICAL NEGATIVE: 1
DIARRHEA: 0
CONSTIPATION: 1
PSYCHIATRIC NEGATIVE: 1
ACTIVITY CHANGE: 1
APPETITE CHANGE: 1
DIAPHORESIS: 0

## 2018-01-01 ASSESSMENT — PATIENT HEALTH QUESTIONNAIRE - PHQ9
SUM OF ALL RESPONSES TO PHQ QUESTIONS 1-9: 0
5. POOR APPETITE OR OVEREATING: NOT AT ALL
SUM OF ALL RESPONSES TO PHQ QUESTIONS 1-9: 0

## 2018-01-24 ENCOUNTER — OFFICE VISIT (OUTPATIENT)
Dept: FAMILY MEDICINE | Facility: OTHER | Age: 83
End: 2018-01-24
Attending: FAMILY MEDICINE
Payer: COMMERCIAL

## 2018-01-24 VITALS
HEART RATE: 93 BPM | SYSTOLIC BLOOD PRESSURE: 178 MMHG | OXYGEN SATURATION: 90 % | DIASTOLIC BLOOD PRESSURE: 80 MMHG | HEIGHT: 70 IN | TEMPERATURE: 96.4 F

## 2018-01-24 DIAGNOSIS — R29.898 WEAKNESS OF BOTH LOWER EXTREMITIES: Primary | ICD-10-CM

## 2018-01-24 LAB
ANION GAP SERPL CALCULATED.3IONS-SCNC: 8 MMOL/L (ref 3–14)
BUN SERPL-MCNC: 38 MG/DL (ref 7–30)
CALCIUM SERPL-MCNC: 9.4 MG/DL (ref 8.5–10.1)
CHLORIDE SERPL-SCNC: 105 MMOL/L (ref 94–109)
CO2 SERPL-SCNC: 30 MMOL/L (ref 20–32)
CREAT SERPL-MCNC: 1.53 MG/DL (ref 0.66–1.25)
GFR SERPL CREATININE-BSD FRML MDRD: 44 ML/MIN/1.7M2
GLUCOSE SERPL-MCNC: 170 MG/DL (ref 70–99)
POTASSIUM SERPL-SCNC: 4 MMOL/L (ref 3.4–5.3)
SODIUM SERPL-SCNC: 143 MMOL/L (ref 133–144)

## 2018-01-24 PROCEDURE — 99214 OFFICE O/P EST MOD 30 MIN: CPT | Performed by: FAMILY MEDICINE

## 2018-01-24 PROCEDURE — G0463 HOSPITAL OUTPT CLINIC VISIT: HCPCS

## 2018-01-24 PROCEDURE — 80048 BASIC METABOLIC PNL TOTAL CA: CPT | Mod: ZL | Performed by: FAMILY MEDICINE

## 2018-01-24 PROCEDURE — 36415 COLL VENOUS BLD VENIPUNCTURE: CPT | Mod: ZL | Performed by: FAMILY MEDICINE

## 2018-01-24 RX ORDER — INSULIN GLARGINE 100 [IU]/ML
INJECTION, SOLUTION SUBCUTANEOUS
Qty: 15 ML | Refills: 3 | Status: SHIPPED | OUTPATIENT
Start: 2018-01-24 | End: 2018-01-01

## 2018-01-24 ASSESSMENT — ANXIETY QUESTIONNAIRES
GAD7 TOTAL SCORE: 0
5. BEING SO RESTLESS THAT IT IS HARD TO SIT STILL: NOT AT ALL
2. NOT BEING ABLE TO STOP OR CONTROL WORRYING: NOT AT ALL
1. FEELING NERVOUS, ANXIOUS, OR ON EDGE: NOT AT ALL
4. TROUBLE RELAXING: NOT AT ALL
3. WORRYING TOO MUCH ABOUT DIFFERENT THINGS: NOT AT ALL
7. FEELING AFRAID AS IF SOMETHING AWFUL MIGHT HAPPEN: NOT AT ALL
6. BECOMING EASILY ANNOYED OR IRRITABLE: NOT AT ALL

## 2018-01-24 ASSESSMENT — PAIN SCALES - GENERAL: PAINLEVEL: NO PAIN (0)

## 2018-01-24 ASSESSMENT — PATIENT HEALTH QUESTIONNAIRE - PHQ9: SUM OF ALL RESPONSES TO PHQ QUESTIONS 1-9: 0

## 2018-01-24 NOTE — NURSING NOTE
"Chief Complaint   Patient presents with     Hospital F/U     [facility] 12/21-12/22 Fall out of bed, unable to ambulate     Formulary Issue     Pt's insurance will not cover Lantus, need to switch to Basaglar.  Metformin was d/c while pt was in [place] for rehab       Initial /80  Pulse 93  Temp 96.4  F (35.8  C)  Ht 5' 10\" (1.778 m)  SpO2 90% Estimated body mass index is 51.91 kg/(m^2) as calculated from the following:    Height as of 12/21/17: 5' 10\" (1.778 m).    Weight as of 12/22/17: 361 lb 12.4 oz (164.1 kg).  Medication Reconciliation: complete   Tonya Cartwright    "

## 2018-01-24 NOTE — MR AVS SNAPSHOT
"              After Visit Summary   1/24/2018    Trey Washington    MRN: [id number]           Patient Information     Date Of Birth          1935        Visit Information        Provider Department      1/24/2018 10:00 AM Moreno Desai MD [facility]        Today's Diagnoses     Weakness of both lower extremities    -  1    IDDM (insulin dependent diabetes mellitus) (H)          Care Instructions    F/u with ongoing concerns.           Follow-ups after your visit        Who to contact     If you have questions or need follow up information about today's clinic visit or your schedule please contact [facility] directly at [phone].  Normal or non-critical lab and imaging results will be communicated to you by MyChart, letter or phone within 4 business days after the clinic has received the results. If you do not hear from us within 7 days, please contact the clinic through [employer] or phone. If you have a critical or abnormal lab result, we will notify you by phone as soon as possible.  Submit refill requests through [employer] or call your pharmacy and they will forward the refill request to us. Please allow 3 business days for your refill to be completed.          Additional Information About Your Visit        MyChart Information     [employer] gives you secure access to your electronic health record. If you see a primary care provider, you can also send messages to your care team and make appointments. If you have questions, please call your primary care clinic.  If you do not have a primary care provider, please call [phone] and they will assist you.        Care EveryWhere ID     This is your Care EveryWhere ID. This could be used by other organizations to access your [place] medical records  [id number]        Your Vitals Were     Pulse Temperature Height Pulse Oximetry          93 96.4  F (35.8  C) 5' 10\" (1.778 m) 90%         Blood Pressure from Last 3 Encounters: "   01/24/18 178/80   12/22/17 (!) 195/112   12/17/17 (!) 171/104    Weight from Last 3 Encounters:   12/22/17 (!) 361 lb 12.4 oz (164.1 kg)   10/18/17 (!) 355 lb (161 kg)   08/07/17 (!) 375 lb (170.1 kg)              We Performed the Following     Basic metabolic panel          Today's Medication Changes          These changes are accurate as of 1/24/18 11:56 AM.  If you have any questions, ask your nurse or doctor.               These medicines have changed or have updated prescriptions.        Dose/Directions    blood glucose monitoring lancets   This may have changed:  See the new instructions.   Used for:  IDDM (insulin dependent diabetes mellitus) (H)   Changed by:  Moreno Desai MD        USE AS DIRECTED TESTING BLOOD SUGAR THREE TIMES DAILY OR AS DIRECTED   Quantity:  300 each   Refills:  1       blood glucose monitoring test strip   Commonly known as:  KEO CONTOUR   This may have changed:  additional instructions   Used for:  IDDM (insulin dependent diabetes mellitus) (H)   Changed by:  Moreno Desai MD        Use to test blood sugars 3 times daily or as directed.   Quantity:  300 each   Refills:  3       * insulin glargine 100 UNIT/ML injection   Commonly known as:  LANTUS SOLOSTAR   This may have changed:  Another medication with the same name was added. Make sure you understand how and when to take each.   Used for:  Type 2 diabetes mellitus with diabetic nephropathy, with long-term current use of insulin (H), Physical deconditioning   Changed by:  Moreno Desai MD        Dose:  10 Units   Inject 10 Units Subcutaneous At Bedtime   Refills:  0       * BASAGLAR 100 UNIT/ML injection   This may have changed:  You were already taking a medication with the same name, and this prescription was added. Make sure you understand how and when to take each.   Used for:  IDDM (insulin dependent diabetes mellitus) (H)   Changed by:  Moreno Desai MD        18 units daily subq   Quantity:  15 mL    Refills:  3       insulin pen needle 30G X 8 MM   Commonly known as:  NOVOFINE   This may have changed:  See the new instructions.   Used for:  IDDM (insulin dependent diabetes mellitus) (H)   Changed by:  Moreno Desai MD        USE 4 PENS NEEDLES DAILY OR AS DIRECTED   Quantity:  100 each   Refills:  6       metFORMIN 1000 MG tablet   Commonly known as:  GLUCOPHAGE   This may have changed:  See the new instructions.   Used for:  IDDM (insulin dependent diabetes mellitus) (H)   Changed by:  Moreno Desai MD        TAKE 1 TABLET BY MOUTH EVERY DAY WITH A MEAL   Quantity:  90 tablet   Refills:  1       * Notice:  This list has 2 medication(s) that are the same as other medications prescribed for you. Read the directions carefully, and ask your doctor or other care provider to review them with you.      Stop taking these medicines if you haven't already. Please contact your care team if you have questions.     UNISTIK 3 NORMAL Misc   Stopped by:  Moreno Desai MD                Where to get your medicines      These medications were sent to [facility] - [place], [place] - [address]  [address], [facility] [address]     Phone:  [phone]     BASAGLAR 100 UNIT/ML injection    blood glucose monitoring lancets    blood glucose monitoring test strip    insulin pen needle 30G X 8 MM    metFORMIN 1000 MG tablet                Primary Care Provider Office Phone # Fax #    Moreno Desai [phone]       [facility] [address]  [facility] [address]        Equal Access to Services     WISAM SANTIAGO AH: Hadii aad ku hadasho Soomaali, waaxda luqadaha, qaybta kaalmada adeegyada, de mancuso . So [facility] [phone].    ATENCIÓN: Si habla español, tiene a cabrera disposición servicios gratuitos de asistencia lingüística. Llame al [phone].    We comply with applicable federal civil rights laws and [place] laws.  "We do not discriminate on the basis of race, color, national origin, age, disability, sex, sexual orientation, or gender identity.            Thank you!     Thank you for choosing [facility]  for your care. Our goal is always to provide you with excellent care. Hearing back from our patients is one way we can continue to improve our services. Please take a few minutes to complete the written survey that you may receive in the mail after your visit with us. Thank you!             Your Updated Medication List - Protect others around you: Learn how to safely use, store and throw away your medicines at www.disposemymeds.org.          This list is accurate as of 1/24/18 11:56 AM.  Always use your most recent med list.                   Brand Name Dispense Instructions for use Diagnosis    allopurinol 300 MG tablet    ZYLOPRIM    90 tablet    Take 1 tablet (300 mg) by mouth daily    Idiopathic gout, unspecified chronicity, unspecified site       AQUACEL-AG EXTRA HYDROFIBER 4\"X5\" Pads     5 each    Externally apply 0.25 each topically every other day Use as directed by wound center    Open wound of abdomen       aspirin 81 MG tablet      Take 1 tablet by mouth daily        blood glucose monitoring lancets     300 each    USE AS DIRECTED TESTING BLOOD SUGAR THREE TIMES DAILY OR AS DIRECTED    IDDM (insulin dependent diabetes mellitus) (H)       blood glucose monitoring meter device kit     1 kit    Use to test blood sugars 2 times daily or as directed.    IDDM (insulin dependent diabetes mellitus) (H)       blood glucose monitoring test strip    KEO CONTOUR    300 each    Use to test blood sugars 3 times daily or as directed.    IDDM (insulin dependent diabetes mellitus) (H)       CAPOTEN PO      Take 50 mg by mouth 3 times daily        cyanocolbalamin 500 MCG tablet    vitamin  B-12    180 tablet    TAKE 2 TABLETS (1,000 MCG) BY MOUTH DAILY    Vitamin deficiency       DOCQLACE 100 MG capsule   Generic " drug:  docusate sodium     100 capsule    TAKE 1 CAPSULE BY MOUTH TWICE DAILY    Constipation       fish oil-omega-3 fatty acids 1000 MG capsule      Take 1,000 mg by mouth daily        * insulin aspart 100 UNIT/ML injection    NovoLOG FLEXPEN    9 mL    INJECT 8 UNITS SUBCUTANEOUS with lunch    Type 2 diabetes mellitus with diabetic nephropathy, with long-term current use of insulin (H), Physical deconditioning       * insulin aspart 100 UNIT/ML injection    NovoLOG PEN     Inject 1-7 Units Subcutaneous 3 times daily (before meals) Do Not give Correction Insulin if Pre-Meal BG less than 140.  For Pre-Meal  - 189=1 unit.  For Pre-Meal  - 239=2 units.  For Pre-Meal  - 289=3 units.  For Pre-Meal  - 339=4 units.  For Pre-Meal - 399=5 units.  For Pre-Meal -449=6 units For Pre-Meal BG >=450 give 7 units.  To be given with prandial insulin, and based on pre-meal blood glucose.    Type 2 diabetes mellitus with diabetic nephropathy, with long-term current use of insulin (H), Physical deconditioning       * insulin glargine 100 UNIT/ML injection    LANTUS SOLOSTAR     Inject 10 Units Subcutaneous At Bedtime    Type 2 diabetes mellitus with diabetic nephropathy, with long-term current use of insulin (H), Physical deconditioning       * BASAGLAR 100 UNIT/ML injection     15 mL    18 units daily subq    IDDM (insulin dependent diabetes mellitus) (H)       insulin pen needle 30G X 8 MM    NOVOFINE    100 each    USE 4 PENS NEEDLES DAILY OR AS DIRECTED    IDDM (insulin dependent diabetes mellitus) (H)       ketoconazole 2 % cream    NIZORAL     Apply topically 2 times daily as needed        levETIRAcetam 1000 MG Tabs     60 tablet    TAKE 1 TABLET BY MOUTH TWICE DAILY    Seizure disorder (H)       metFORMIN 1000 MG tablet    GLUCOPHAGE    90 tablet    TAKE 1 TABLET BY MOUTH EVERY DAY WITH A MEAL    IDDM (insulin dependent diabetes mellitus) (H)       miconazole 2 % powder    MICATIN; MICRO  GUARD     Apply topically 2 times daily as needed        * order for DME     1 each    Equipment being ordered: Oxygen 2 LPM per nasal cannula during the day, portable also    Hypoxia       * order for DME     1 each    Equipment being ordered: Wheelchair    Morbid obesity, unspecified obesity type (H)       * order for DME     1 each    Equipment being ordered: Meplilex Transfer 6 X 8 inch ([id number]) - disp 3;  Aquacel AG 4 X 5 ([id number]); disp 3 - Refills 6    Skin ulcer, limited to breakdown of skin (H)       order for DME     1 Units    4x4 bulk SOFT gauze (disp 1 sleeve, refill x 5)    Ulcer of skin (H)       Oyster Shell Calcium 500 MG tablet    OS-[place] 500 mg [place]. Ca    180 tablet    TAKE 1 TABLET BY MOUTH TWICE DAILY    HTN (hypertension)       polyethylene glycol powder    MIRALAX/GLYCOLAX    527 g    TAKE 17 GRAMS BY MOUTH DAILY MIXED AS DIRECTED.    Constipation       sertraline 100 MG tablet    ZOLOFT    90 tablet    TAKE 1 TABLET BY MOUTH ONCE DAILY    Dysthymia       simvastatin 10 MG tablet    ZOCOR    90 tablet    TAKE 1 TABLET BY MOUTH NIGHTLY AT BEDTIME    Hypercholesterolemia       T.E.D. BELOW KNEE/L-REGULAR Misc     6 each    Apply in am and take off in the evening    Swelling of limb       terazosin 10 MG capsule    HYTRIN    90 capsule    TAKE 1 CAPSULE BY MOUTH NIGHTLY AT BEDTIME    Essential hypertension       traMADol 50 MG tablet    ULTRAM    30 tablet    Take 1 tablet (50 mg) by mouth every 8 hours as needed for moderate pain    Type 2 diabetes mellitus with diabetic nephropathy, with long-term current use of insulin (H), Chronic pain syndrome, Physical deconditioning, Osteoarthrosis involving lower leg       TYLENOL PO      Take 1,000 mg by mouth At Bedtime        UNABLE TO FIND      CPAP        VITAMIN C PO      Take 500 mg by mouth daily        VITAMIN D HIGH POTENCY 1000 UNITS Caps     90 capsule    TAKE 1 CAPSULE BY MOUTH DAILY    Vitamin D deficiency       vitamin E 100 UNIT capsule     TOCOPHEROL     Take 100 Units by mouth daily Uncertain of dose        * Notice:  This list has 7 medication(s) that are the same as other medications prescribed for you. Read the directions carefully, and ask your doctor or other care provider to review them with you.

## 2018-01-24 NOTE — PROGRESS NOTES
Trey Washington    January 24, 2018    Chief Complaint   Patient presents with     Hospital F/U     [facility] 12/21-12/22 Fall out of bed, unable to ambulate     Formulary Issue     Pt's insurance will not cover Lantus, need to switch to Basaglar.  Metformin was d/c while pt was in [place] for rehab       SUBJECTIVE:  Here for f/u.  Doing really well again.  Back to baseline.   Lost strength in his legs for unknown reasons.  Feeling well and doing well now.  Wants to go back to previous dm cares.  See below.  Lengthy review today.      Past Medical History:   Diagnosis Date     Chronic kidney disease, stage III (moderate)      Obesity, unspecified      Obstructive sleep apnea (adult) (pediatric)      Osteoarthrosis, unspecified whether generalized or localized, lower leg      Other abnormal blood chemistry     hyperuricemia     Other B-complex deficiencies      Other choreas     Hemiballism     Pure hypercholesterolemia      Skin ulcer (H)      Type II or unspecified type diabetes mellitus without mention of complication, not stated as uncontrolled      Unspecified essential hypertension        Past Surgical History:   Procedure Laterality Date     COLONOSCOPY       ENT SURGERY      lanced abcess in left ear     GENITOURINARY SURGERY      removal of kidney stones     ORTHOPEDIC SURGERY      knee arthroscopy     ORTHOPEDIC SURGERY  2003    bilateral knee replacement     ORTHOPEDIC SURGERY      right knee arthroscopy     PHACOEMULSIFICATION WITH STANDARD INTRAOCULAR LENS IMPLANT  2/11/2014    Procedure: PHACOEMULSIFICATION WITH STANDARD INTRAOCULAR LENS IMPLANT;  CATARACT EXTRACTION WITH INTRA OCULAR LENS RIGHT;  Surgeon: Luis James MD;  Location: [facility] OR     PHACOEMULSIFICATION WITH STANDARD INTRAOCULAR LENS IMPLANT  3/11/2014    Procedure: PHACOEMULSIFICATION WITH STANDARD INTRAOCULAR LENS IMPLANT;  CATARACT EXTRACTION WITH INTRA OCULAR LENS LEFT  ;  Surgeon: Luis James MD;  Location: [facility] OR     TONSILLECTOMY  & ADENOIDECTOMY  1942       Current Outpatient Prescriptions   Medication Sig Dispense Refill     BASAGLAR 100 UNIT/ML injection 18 units daily subq 15 mL 3     blood glucose monitoring (KEO CONTOUR) test strip Use to test blood sugars 3 times daily or as directed. 300 each 3     blood glucose monitoring (KEO MICROLET) lancets USE AS DIRECTED TESTING BLOOD SUGAR THREE TIMES DAILY OR AS DIRECTED 300 each 1     insulin pen needle (NOVOFINE) 30G X 8 MM USE 4 PENS NEEDLES DAILY OR AS DIRECTED 100 each 6     metFORMIN (GLUCOPHAGE) 1000 MG tablet TAKE 1 TABLET BY MOUTH EVERY DAY WITH A MEAL 90 tablet 1     DOCQLACE 100 MG capsule TAKE 1 CAPSULE BY MOUTH TWICE DAILY 100 capsule 1     ketoconazole (NIZORAL) 2 % cream Apply topically 2 times daily as needed       miconazole (MICATIN; MICRO GUARD) 2 % powder Apply topically 2 times daily as needed       Captopril (CAPOTEN PO) Take 50 mg by mouth 3 times daily       traMADol (ULTRAM) 50 MG tablet Take 1 tablet (50 mg) by mouth every 8 hours as needed for moderate pain 30 tablet 3     insulin glargine (LANTUS SOLOSTAR) 100 UNIT/ML injection Inject 10 Units Subcutaneous At Bedtime       insulin aspart (NOVOLOG FLEXPEN) 100 UNIT/ML injection INJECT 8 UNITS SUBCUTANEOUS with lunch 9 mL 0     insulin aspart (NOVOLOG PEN) 100 UNIT/ML injection Inject 1-7 Units Subcutaneous 3 times daily (before meals) Do Not give Correction Insulin if Pre-Meal BG less than 140.   For Pre-Meal  - 189=1 unit.   For Pre-Meal  - 239=2 units.   For Pre-Meal  - 289=3 units.   For Pre-Meal  - 339=4 units.   For Pre-Meal - 399=5 units.   For Pre-Meal -449=6 units  For Pre-Meal BG >=450 give 7 units.   To be given with prandial insulin, and based on pre-meal blood glucose.       simvastatin (ZOCOR) 10 MG tablet TAKE 1 TABLET BY MOUTH NIGHTLY AT BEDTIME 90 tablet 0     Oyster Shell Calcium (OS-MARILIN 500 MG [place]. [place]) 500 MG tablet TAKE 1 TABLET BY MOUTH TWICE DAILY 180  "tablet 2     terazosin (HYTRIN) 10 MG capsule TAKE 1 CAPSULE BY MOUTH NIGHTLY AT BEDTIME 90 capsule 2     polyethylene glycol (MIRALAX/GLYCOLAX) powder TAKE 17 GRAMS BY MOUTH DAILY MIXED AS DIRECTED. 527 g 8     allopurinol (ZYLOPRIM) 300 MG tablet Take 1 tablet (300 mg) by mouth daily 90 tablet 3     levETIRAcetam 1000 MG TABS TAKE 1 TABLET BY MOUTH TWICE DAILY 60 tablet 5     Cholecalciferol (VITAMIN D HIGH POTENCY) 1000 UNITS CAPS TAKE 1 CAPSULE BY MOUTH DAILY 90 capsule 3     cyanocolbalamin (VITAMIN  B-12) 500 MCG tablet TAKE 2 TABLETS (1,000 MCG) BY MOUTH DAILY 180 tablet 1     fish oil-omega-3 fatty acids 1000 MG capsule Take 1,000 mg by mouth daily       sertraline (ZOLOFT) 100 MG tablet TAKE 1 TABLET BY MOUTH ONCE DAILY 90 tablet 3     Acetaminophen (TYLENOL PO) Take 1,000 mg by mouth At Bedtime        blood glucose monitoring ([place] CONTOUR MONITOR) meter device kit Use to test blood sugars 2 times daily or as directed. 1 kit 0     Silver-Carboxymethylcellulose (AQUACEL-AG EXTRA HYDROFIBER) 4\"X5\" PADS Externally apply 0.25 each topically every other day Use as directed by wound center 5 each 3     order for DME 4x4 bulk SOFT gauze (disp 1 sleeve, refill x 5) 1 Units 0     Ascorbic Acid (VITAMIN C PO) Take 500 mg by mouth daily        vitamin E (TOCOPHEROL) 100 UNIT capsule Take 100 Units by mouth daily Uncertain of dose       order for DME Equipment being ordered: Meplilex Transfer 6 X 8 inch ([id number]) - disp 3;  Aquacel AG 4 X 5 ([id number]); disp 3 - Refills 6 1 each 6     order for DME Equipment being ordered: Oxygen 2 LPM per nasal cannula during the day, portable also 1 each 11     order for DME Equipment being ordered: Wheelchair 1 each 0     Elastic Bandages & Supports (T.E.D. BELOW KNEE/L-REGULAR) MISC Apply in am and take off in the evening 6 each 11     UNABLE TO FIND CPAP       aspirin 81 MG tablet Take 1 tablet by mouth daily       [DISCONTINUED] metFORMIN (GLUCOPHAGE) 1000 MG tablet TAKE 1 TABLET " BY MOUTH EVERY DAY WITH A MEAL (Patient not taking: Reported on 1/24/2018) 90 tablet 1       Allergies   Allergen Reactions     Hydrocodone      Intolerance       Penicillins Swelling       Family History   Problem Relation Age of Onset     C.A.D. Father      MI     Other - See Comments Father      shell shocked in the war     DIABETES Paternal Grandmother        Social History     Social History     Marital status:      Spouse name: N/A     Number of children: N/A     Years of education: N/A     Occupational History           retired     Social History Main Topics     Smoking status: Former Smoker     Years: 10.00     Types: Cigars     Quit date: 9/29/1987     Smokeless tobacco: Never Used     Alcohol use No     Drug use: No     Sexual activity: No      Comment:      Other Topics Concern     Parent/Sibling W/ Cabg, Mi Or Angioplasty Before 65f 55m? No      Service Yes     [employer]     Blood Transfusions Yes     Permits if needed     Seat Belt Yes     Social History Narrative       5 point ROS negative except as noted above in HPI, including Gen., Resp., CV, GI &  system review.     OBJECTIVE:  B/P: 178/80, Temperature: 96.4, Pulse: 93, Respirations: Data Unavailable    GENERAL APPEARANCE: Alert, no acute distress  CV: regular rate and rhythm, no murmur, rub or gallop  RESP: lungs clear to auscultation bilaterally  ABDOMEN: normal bowel sounds, soft, nontender, no hepatosplenomegaly or other masses  1+ edema bilaterally LE  SKIN: no suspicious lesions or rashes to visualized skin  NEURO: Alert, oriented x 3, speech and mentation normal    ASSESSMENT and PLAN:  (R29.898) Weakness of both lower extremities  (primary encounter diagnosis)  Comment: improved.   Plan: continue HEP.    Think about PT though it's tough to get him there.  Consider homebound if needed as he clearly is.      (E11.9,  Z79.4) IDDM (insulin dependent diabetes mellitus) (H)  Comment: reviewed.   Plan: BASAGLAR  100 UNIT/ML injection, blood glucose         monitoring (KEO CONTOUR) test strip, blood         glucose monitoring (KEO MICROLET) lancets,         insulin pen needle (NOVOFINE) 30G X 8 MM,         metFORMIN (GLUCOPHAGE) 1000 MG tablet        Back to previous.  restart glucophage and make sure creatinine still ok today.  Was up on presentation, it was stopped, and sugars are high and sliding scale bothersome.      25 minutes spent with patient and caregiver, over 50%in counseling regarding the weakness  And the dm and the cares.  We went back to all the same tx as previous to the hospitalization.

## 2018-01-25 ASSESSMENT — ANXIETY QUESTIONNAIRES: GAD7 TOTAL SCORE: 0

## 2018-02-12 LAB
ANION GAP SERPL CALCULATED.3IONS-SCNC: 12 MMOL/L (ref 3–14)
BUN SERPL-MCNC: 31 MG/DL (ref 7–30)
CALCIUM SERPL-MCNC: 9.7 MG/DL (ref 8.5–10.1)
CHLORIDE SERPL-SCNC: 102 MMOL/L (ref 94–109)
CO2 SERPL-SCNC: 27 MMOL/L (ref 20–32)
CREAT SERPL-MCNC: 1.55 MG/DL (ref 0.66–1.25)
GFR SERPL CREATININE-BSD FRML MDRD: 43 ML/MIN/1.7M2
GLUCOSE SERPL-MCNC: 160 MG/DL (ref 70–99)
POTASSIUM SERPL-SCNC: 3.9 MMOL/L (ref 3.4–5.3)
SODIUM SERPL-SCNC: 141 MMOL/L (ref 133–144)

## 2018-02-12 PROCEDURE — 36415 COLL VENOUS BLD VENIPUNCTURE: CPT | Mod: ZL | Performed by: FAMILY MEDICINE

## 2018-02-12 PROCEDURE — 80048 BASIC METABOLIC PNL TOTAL CA: CPT | Mod: ZL | Performed by: FAMILY MEDICINE

## 2018-02-19 DIAGNOSIS — Z79.4 TYPE 2 DIABETES MELLITUS WITH DIABETIC NEPHROPATHY, WITH LONG-TERM CURRENT USE OF INSULIN (H): ICD-10-CM

## 2018-02-19 DIAGNOSIS — F34.1 DYSTHYMIA: ICD-10-CM

## 2018-02-19 DIAGNOSIS — R53.81 PHYSICAL DECONDITIONING: ICD-10-CM

## 2018-02-19 DIAGNOSIS — G89.4 CHRONIC PAIN SYNDROME: Chronic | ICD-10-CM

## 2018-02-19 DIAGNOSIS — E11.21 TYPE 2 DIABETES MELLITUS WITH DIABETIC NEPHROPATHY, WITH LONG-TERM CURRENT USE OF INSULIN (H): ICD-10-CM

## 2018-02-21 RX ORDER — TRAMADOL HYDROCHLORIDE 50 MG/1
TABLET ORAL
Qty: 90 TABLET | Refills: 0 | Status: SHIPPED | OUTPATIENT
Start: 2018-02-21 | End: 2018-04-02

## 2018-02-21 RX ORDER — SERTRALINE HYDROCHLORIDE 100 MG/1
TABLET, FILM COATED ORAL
Qty: 90 TABLET | Refills: 1 | Status: SHIPPED | OUTPATIENT
Start: 2018-02-21 | End: 2018-06-02

## 2018-02-21 NOTE — TELEPHONE ENCOUNTER
Controlled Substance Refill Request for Ultram  Problem List Complete:  Yes    Last Written Prescription Date:  12.22.17  Last Fill Quantity: 30,   # refills: 3    [place] [facility] [place]: 1.24.18    Clinic visit frequency required: Q 3 months     Future Office visit:     Controlled substance agreement on file: Yes:  Date 10.19.17.     Processing:  Fax Rx to [facility]   checked in past 6 months?  Yes 12.19.17

## 2018-03-19 DIAGNOSIS — G40.909 SEIZURE DISORDER (H): ICD-10-CM

## 2018-03-20 NOTE — TELEPHONE ENCOUNTER
LEVETIRACETAM 1000 MG TABLET    Last Written Prescription Date:  8/22/2017  Last Fill Quantity: 60,   # refills: 5  Last Office Visit: 1/24/2018  Future Office visit:

## 2018-03-21 RX ORDER — LEVETIRACETAM 1000 MG/1
TABLET ORAL
Qty: 60 TABLET | Refills: 3 | Status: SHIPPED | OUTPATIENT
Start: 2018-03-21 | End: 2018-06-11

## 2018-04-02 DIAGNOSIS — G89.4 CHRONIC PAIN SYNDROME: Chronic | ICD-10-CM

## 2018-04-02 DIAGNOSIS — R53.81 PHYSICAL DECONDITIONING: ICD-10-CM

## 2018-04-02 DIAGNOSIS — E11.21 TYPE 2 DIABETES MELLITUS WITH DIABETIC NEPHROPATHY, WITH LONG-TERM CURRENT USE OF INSULIN (H): ICD-10-CM

## 2018-04-02 DIAGNOSIS — Z79.4 TYPE 2 DIABETES MELLITUS WITH DIABETIC NEPHROPATHY, WITH LONG-TERM CURRENT USE OF INSULIN (H): ICD-10-CM

## 2018-04-02 NOTE — TELEPHONE ENCOUNTER
Controlled Substance Refill Request for Ultram  Problem List Complete:  Yes    Last Written Prescription Date:  2/21/18  Last Fill Quantity: 90,   # refills: 0    [place] [facility] [place] 1/24/18 with Dr. Desai    Clinic visit frequency required: Q 3 months     Future Office visit:     Controlled substance agreement on file: Yes:  Date 10/18/17.     Processing:  [facility]   checked in past 6 months?  Yes 12/19/17

## 2018-04-03 RX ORDER — TRAMADOL HYDROCHLORIDE 50 MG/1
TABLET ORAL
Qty: 90 TABLET | Refills: 0 | Status: SHIPPED | OUTPATIENT
Start: 2018-04-03 | End: 2018-05-15

## 2018-04-16 DIAGNOSIS — I10 ESSENTIAL HYPERTENSION: ICD-10-CM

## 2018-04-16 NOTE — TELEPHONE ENCOUNTER
[employer]oten      Last Written Prescription Date:  historical  Last Fill Quantity: ,   # refills:   Last Office Visit: 1/24/18  Future Office visit:   none

## 2018-04-17 RX ORDER — CAPTOPRIL 50 MG/1
TABLET ORAL
Qty: 270 TABLET | Refills: 0 | Status: SHIPPED | OUTPATIENT
Start: 2018-04-17 | End: 2018-01-01

## 2018-04-30 DIAGNOSIS — K59.00 CONSTIPATION: ICD-10-CM

## 2018-05-01 RX ORDER — DOCUSATE SODIUM 100 MG/1
TABLET ORAL
Qty: 100 CAPSULE | Refills: 3 | Status: SHIPPED | OUTPATIENT
Start: 2018-05-01 | End: 2018-01-01

## 2018-05-07 ENCOUNTER — OFFICE VISIT (OUTPATIENT)
Dept: FAMILY MEDICINE | Facility: OTHER | Age: 83
End: 2018-05-07
Attending: FAMILY MEDICINE
Payer: MEDICARE

## 2018-05-07 VITALS
TEMPERATURE: 96.5 F | OXYGEN SATURATION: 90 % | HEIGHT: 70 IN | HEART RATE: 76 BPM | DIASTOLIC BLOOD PRESSURE: 88 MMHG | SYSTOLIC BLOOD PRESSURE: 134 MMHG

## 2018-05-07 DIAGNOSIS — K42.0 UMBILICAL HERNIA WITH OBSTRUCTION: ICD-10-CM

## 2018-05-07 DIAGNOSIS — E11.21 TYPE 2 DIABETES MELLITUS WITH DIABETIC NEPHROPATHY, WITH LONG-TERM CURRENT USE OF INSULIN (H): ICD-10-CM

## 2018-05-07 DIAGNOSIS — N39.43 URINARY INCONTINENCE, POST-VOID DRIBBLING: ICD-10-CM

## 2018-05-07 DIAGNOSIS — Z79.4 TYPE 2 DIABETES MELLITUS WITH DIABETIC NEPHROPATHY, WITH LONG-TERM CURRENT USE OF INSULIN (H): ICD-10-CM

## 2018-05-07 DIAGNOSIS — I10 ESSENTIAL HYPERTENSION WITH GOAL BLOOD PRESSURE LESS THAN 140/90: ICD-10-CM

## 2018-05-07 DIAGNOSIS — E11.9 CONTROLLED TYPE 2 DIABETES MELLITUS WITHOUT COMPLICATION, WITH LONG-TERM CURRENT USE OF INSULIN (H): Primary | ICD-10-CM

## 2018-05-07 DIAGNOSIS — Z79.4 CONTROLLED TYPE 2 DIABETES MELLITUS WITHOUT COMPLICATION, WITH LONG-TERM CURRENT USE OF INSULIN (H): Primary | ICD-10-CM

## 2018-05-07 DIAGNOSIS — S31.109D OPEN WOUND OF ABDOMEN, SUBSEQUENT ENCOUNTER: ICD-10-CM

## 2018-05-07 LAB
ALBUMIN SERPL-MCNC: 3.4 G/DL (ref 3.4–5)
ALBUMIN UR-MCNC: 100 MG/DL
ALP SERPL-CCNC: 50 U/L (ref 40–150)
ALT SERPL W P-5'-P-CCNC: 12 U/L (ref 0–70)
ANION GAP SERPL CALCULATED.3IONS-SCNC: 8 MMOL/L (ref 3–14)
APPEARANCE UR: CLEAR
AST SERPL W P-5'-P-CCNC: 12 U/L (ref 0–45)
BILIRUB SERPL-MCNC: 0.5 MG/DL (ref 0.2–1.3)
BILIRUB UR QL STRIP: NEGATIVE
BUN SERPL-MCNC: 33 MG/DL (ref 7–30)
CALCIUM SERPL-MCNC: 9.5 MG/DL (ref 8.5–10.1)
CHLORIDE SERPL-SCNC: 105 MMOL/L (ref 94–109)
CHOLEST SERPL-MCNC: 155 MG/DL
CO2 SERPL-SCNC: 30 MMOL/L (ref 20–32)
COLOR UR AUTO: YELLOW
CREAT SERPL-MCNC: 1.69 MG/DL (ref 0.66–1.25)
CREAT UR-MCNC: 96 MG/DL
EST. AVERAGE GLUCOSE BLD GHB EST-MCNC: 111 MG/DL
GFR SERPL CREATININE-BSD FRML MDRD: 39 ML/MIN/1.7M2
GLUCOSE SERPL-MCNC: 104 MG/DL (ref 70–99)
GLUCOSE UR STRIP-MCNC: NEGATIVE MG/DL
HBA1C MFR BLD: 5.5 % (ref 0–6.4)
HDLC SERPL-MCNC: 41 MG/DL
HGB UR QL STRIP: NEGATIVE
KETONES UR STRIP-MCNC: NEGATIVE MG/DL
LDLC SERPL CALC-MCNC: 88 MG/DL
LEUKOCYTE ESTERASE UR QL STRIP: NEGATIVE
MICROALBUMIN UR-MCNC: 655 MG/L
MICROALBUMIN/CREAT UR: 683 MG/G CR (ref 0–17)
NITRATE UR QL: NEGATIVE
NONHDLC SERPL-MCNC: 114 MG/DL
PH UR STRIP: 5.5 PH (ref 5–7)
POTASSIUM SERPL-SCNC: 4 MMOL/L (ref 3.4–5.3)
PROT SERPL-MCNC: 7.5 G/DL (ref 6.8–8.8)
RBC #/AREA URNS AUTO: NORMAL /HPF
SODIUM SERPL-SCNC: 143 MMOL/L (ref 133–144)
SOURCE: ABNORMAL
SP GR UR STRIP: 1.02 (ref 1–1.03)
TRIGL SERPL-MCNC: 130 MG/DL
UROBILINOGEN UR STRIP-ACNC: 0.2 EU/DL (ref 0.2–1)
WBC #/AREA URNS AUTO: NORMAL /HPF

## 2018-05-07 PROCEDURE — G0463 HOSPITAL OUTPT CLINIC VISIT: HCPCS

## 2018-05-07 PROCEDURE — 82043 UR ALBUMIN QUANTITATIVE: CPT | Mod: ZL | Performed by: FAMILY MEDICINE

## 2018-05-07 PROCEDURE — 36415 COLL VENOUS BLD VENIPUNCTURE: CPT | Mod: ZL | Performed by: FAMILY MEDICINE

## 2018-05-07 PROCEDURE — 83036 HEMOGLOBIN GLYCOSYLATED A1C: CPT | Mod: ZL | Performed by: FAMILY MEDICINE

## 2018-05-07 PROCEDURE — 80053 COMPREHEN METABOLIC PANEL: CPT | Mod: ZL | Performed by: FAMILY MEDICINE

## 2018-05-07 PROCEDURE — 81001 URINALYSIS AUTO W/SCOPE: CPT | Mod: ZL | Performed by: FAMILY MEDICINE

## 2018-05-07 PROCEDURE — 40000788 ZZHCL STATISTIC ESTIMATED AVERAGE GLUCOSE: Mod: ZL | Performed by: FAMILY MEDICINE

## 2018-05-07 PROCEDURE — 99214 OFFICE O/P EST MOD 30 MIN: CPT | Performed by: FAMILY MEDICINE

## 2018-05-07 PROCEDURE — 80061 LIPID PANEL: CPT | Mod: ZL | Performed by: FAMILY MEDICINE

## 2018-05-07 RX ORDER — FOAM BANDAGE 4" X 4"
0.25 BANDAGE TOPICAL EVERY OTHER DAY
Qty: 5 EACH | Refills: 3 | Status: SHIPPED | OUTPATIENT
Start: 2018-05-07 | End: 2018-01-01

## 2018-05-07 ASSESSMENT — PAIN SCALES - GENERAL: PAINLEVEL: NO PAIN (0)

## 2018-05-07 ASSESSMENT — ANXIETY QUESTIONNAIRES
GAD7 TOTAL SCORE: 0
6. BECOMING EASILY ANNOYED OR IRRITABLE: NOT AT ALL
5. BEING SO RESTLESS THAT IT IS HARD TO SIT STILL: NOT AT ALL
1. FEELING NERVOUS, ANXIOUS, OR ON EDGE: NOT AT ALL
3. WORRYING TOO MUCH ABOUT DIFFERENT THINGS: NOT AT ALL
7. FEELING AFRAID AS IF SOMETHING AWFUL MIGHT HAPPEN: NOT AT ALL
2. NOT BEING ABLE TO STOP OR CONTROL WORRYING: NOT AT ALL

## 2018-05-07 ASSESSMENT — PATIENT HEALTH QUESTIONNAIRE - PHQ9: 5. POOR APPETITE OR OVEREATING: NOT AT ALL

## 2018-05-07 NOTE — MR AVS SNAPSHOT
After Visit Summary   5/7/2018    Trey Washington    MRN: [id number]           Patient Information     Date Of Birth          1935        Visit Information        Provider Department      5/7/2018 9:45 AM Moreno Desai MD [facility]        Today's Diagnoses     Controlled type 2 diabetes mellitus without complication, with long-term current use of insulin (H)    -  1    Type 2 diabetes mellitus with diabetic nephropathy, with long-term current use of insulin (H)        Essential hypertension with goal blood pressure less than 140/90        Open wound of abdomen, subsequent encounter        Umbilical hernia with obstruction        Urinary incontinence, post-void dribbling          Care Instructions    F/u with ongoing concerns.           Follow-ups after your visit        Who to contact     If you have questions or need follow up information about today's clinic visit or your schedule please contact [facility] directly at [phone].  Normal or non-critical lab and imaging results will be communicated to you by MyChart, letter or phone within 4 business days after the clinic has received the results. If you do not hear from us within 7 days, please contact the clinic through [email or web address] or phone. If you have a critical or abnormal lab result, we will notify you by phone as soon as possible.  Submit refill requests through [employer] or call your pharmacy and they will forward the refill request to us. Please allow 3 business days for your refill to be completed.          Additional Information About Your Visit        MyChart Information     [employer] gives you secure access to your electronic health record. If you see a primary care provider, you can also send messages to your care team and make appointments. If you have questions, please call your primary care clinic.  If you do not have a primary care provider, please call [phone] and they will assist you.       "  Care EveryWhere ID     This is your Care EveryWhere ID. This could be used by other organizations to access your [place] medical records  [id number]        Your Vitals Were     Pulse Temperature Height Pulse Oximetry          76 96.5  F (35.8  C) 5' 10\" (1.778 m) 90%         Blood Pressure from Last 3 Encounters:   05/07/18 134/88   01/24/18 178/80   12/22/17 (!) 195/112    Weight from Last 3 Encounters:   12/22/17 (!) 361 lb 12.4 oz (164.1 kg)   10/18/17 (!) 355 lb (161 kg)   08/07/17 (!) 375 lb (170.1 kg)              We Performed the Following     *UA reflex to Microscopic and Culture - [facility]     Albumin Random Urine Quantitative with Creat Ratio     Comprehensive metabolic panel (BMP + Alb, Alk Phos, ALT, AST, Total. Bili, TP)     Hemoglobin A1c     Lipid Profile (Chol, Trig, HDL, LDL calc)     Urine Microscopic          Today's Medication Changes          These changes are accurate as of 5/7/18 10:53 AM.  If you have any questions, ask your nurse or doctor.               These medicines have changed or have updated prescriptions.        Dose/Directions    BASAGLAR 100 UNIT/ML injection   This may have changed:  Another medication with the same name was removed. Continue taking this medication, and follow the directions you see here.   Used for:  IDDM (insulin dependent diabetes mellitus) (H)   Changed by:  Moreno Desai MD        18 units daily subq   Quantity:  15 mL   Refills:  3       insulin aspart 100 UNIT/ML injection   Commonly known as:  NovoLOG FLEXPEN   This may have changed:  Another medication with the same name was removed. Continue taking this medication, and follow the directions you see here.   Used for:  Type 2 diabetes mellitus with diabetic nephropathy, with long-term current use of insulin (H), Physical deconditioning   Changed by:  Moreno Desai MD        INJECT 8 UNITS SUBCUTANEOUS with lunch   Quantity:  9 mL   Refills:  0            Where to get your medicines " "     These medications were sent to [facility] - [place], [place] - [address]  [address], [facility] [address]     Phone:  [phone]     AQUACEL-AG EXTRA HYDROFIBER 4\"X5\" Pads                Primary Care Provider Office Phone # Fax #    Moreno Desai [phone]       [address] [place] [address] E  [facility] [address]        Equal Access to Services     WISAM SANTIAGO : Hadii aad ku hadasho Soomaali, waaxda luqadaha, qaybta kaalmada adeegyada, waxay idiin hayaan adeeg kharash la'aan . So [facility] [phone].    ATENCIÓN: Si habla español, tiene a cabrera disposición servicios gratuitos de asistencia lingüística. [facility] [phone].    We comply with applicable federal civil rights laws and [place] laws. We do not discriminate on the basis of race, color, national origin, age, disability, sex, sexual orientation, or gender identity.            Thank you!     Thank you for choosing [facility]  for your care. Our goal is always to provide you with excellent care. Hearing back from our patients is one way we can continue to improve our services. Please take a few minutes to complete the written survey that you may receive in the mail after your visit with us. Thank you!             Your Updated Medication List - Protect others around you: Learn how to safely use, store and throw away your medicines at www.disposemymeds.org.          This list is accurate as of 5/7/18 10:53 AM.  Always use your most recent med list.                   Brand Name Dispense Instructions for use Diagnosis    allopurinol 300 MG tablet    ZYLOPRIM    90 tablet    Take 1 tablet (300 mg) by mouth daily    Idiopathic gout, unspecified chronicity, unspecified site       AQUACEL-AG EXTRA HYDROFIBER 4\"X5\" Pads     5 each    Externally apply 0.25 each topically every other day Use as directed by wound center    Open wound of abdomen, subsequent encounter       aspirin 81 MG tablet      " Take 1 tablet by mouth every other day        BASAGLAR 100 UNIT/ML injection     15 mL    18 units daily subq    IDDM (insulin dependent diabetes mellitus) (H)       blood glucose monitoring lancets     300 each    USE AS DIRECTED TESTING BLOOD SUGAR THREE TIMES DAILY OR AS DIRECTED    IDDM (insulin dependent diabetes mellitus) (H)       blood glucose monitoring meter device kit     1 kit    Use to test blood sugars 2 times daily or as directed.    IDDM (insulin dependent diabetes mellitus) (H)       blood glucose monitoring test strip    KEO CONTOUR    300 each    Use to test blood sugars 3 times daily or as directed.    IDDM (insulin dependent diabetes mellitus) (H)       * CAPOTEN PO      Take 50 mg by mouth 3 times daily        * captopril 50 MG tablet    CAPOTEN    270 tablet    TAKE 2 TABLETS (100MG) BY MOUTH THREE TIMES DAILY    Essential hypertension       cyanocolbalamin 500 MCG tablet    vitamin  B-12    180 tablet    TAKE 2 TABLETS (1,000 MCG) BY MOUTH DAILY    Vitamin deficiency       DOCQLACE 100 MG capsule   Generic drug:  docusate sodium     100 capsule    TAKE 1 CAPSULE BY MOUTH TWICE DAILY    Constipation       fish oil-omega-3 fatty acids 1000 MG capsule      Take 1,000 mg by mouth daily        insulin aspart 100 UNIT/ML injection    NovoLOG FLEXPEN    9 mL    INJECT 8 UNITS SUBCUTANEOUS with lunch    Type 2 diabetes mellitus with diabetic nephropathy, with long-term current use of insulin (H), Physical deconditioning       insulin pen needle 30G X 8 MM    NOVOFINE    100 each    USE 4 PENS NEEDLES DAILY OR AS DIRECTED    IDDM (insulin dependent diabetes mellitus) (H)       ketoconazole 2 % cream    NIZORAL     Apply topically 2 times daily as needed        levETIRAcetam 1000 MG Tabs     60 tablet    TAKE 1 TABLET BY MOUTH TWICE DAILY    Seizure disorder (H)       metFORMIN 1000 MG tablet    GLUCOPHAGE    90 tablet    TAKE 1 TABLET BY MOUTH EVERY DAY WITH A MEAL    IDDM (insulin dependent  diabetes mellitus) (H)       miconazole 2 % powder    MICATIN; MICRO GUARD     Apply topically 2 times daily as needed        * order for DME     1 each    Equipment being ordered: Oxygen 2 LPM per nasal cannula during the day, portable also    Hypoxia       * order for DME     1 each    Equipment being ordered: Wheelchair    Morbid obesity, unspecified obesity type (H)       * order for DME     1 each    Equipment being ordered: Meplilex Transfer 6 X 8 inch ([id number]) - disp 3;  Aquacel AG 4 X 5 ([id number]); disp 3 - Refills 6    Skin ulcer, limited to breakdown of skin (H)       order for DME     1 Units    4x4 bulk SOFT gauze (disp 1 sleeve, refill x 5)    Ulcer of skin (H)       oyster shell calcium 500 MG tablet    OS-[place]ris 500 mg [place]. Ca    180 tablet    TAKE 1 TABLET BY MOUTH TWICE DAILY    HTN (hypertension)       polyethylene glycol powder    MIRALAX/GLYCOLAX    527 g    TAKE 17 GRAMS BY MOUTH DAILY MIXED AS DIRECTED.    Constipation       sertraline 100 MG tablet    ZOLOFT    90 tablet    TAKE 1 TABLET BY MOUTH ONCE DAILY    Dysthymia       simvastatin 10 MG tablet    ZOCOR    90 tablet    TAKE 1 TABLET BY MOUTH NIGHTLY AT BEDTIME    Hypercholesterolemia       T.E.D. BELOW KNEE/L-REGULAR Misc     6 each    Apply in am and take off in the evening    Swelling of limb       terazosin 10 MG capsule    HYTRIN    90 capsule    TAKE 1 CAPSULE BY MOUTH NIGHTLY AT BEDTIME    Essential hypertension       traMADol 50 MG tablet    ULTRAM    90 tablet    TAKE 1 TABLET BY MOUTH EVERY 8 HOURS AS NEEDED FOR MODERATE PAIN    Type 2 diabetes mellitus with diabetic nephropathy, with long-term current use of insulin (H), Chronic pain syndrome, Physical deconditioning, Osteoarthrosis involving lower leg       TYLENOL PO      Take 1,000 mg by mouth At Bedtime        UNABLE TO FIND      CPAP        VITAMIN C PO      Take 500 mg by mouth daily        VITAMIN D HIGH POTENCY 1000 units Caps     90 capsule    TAKE 1 CAPSULE BY MOUTH DAILY     Vitamin D deficiency       vitamin E 100 UNIT capsule    TOCOPHEROL     Take 100 Units by mouth daily Uncertain of dose        * Notice:  This list has 5 medication(s) that are the same as other medications prescribed for you. Read the directions carefully, and ask your doctor or other care provider to review them with you.

## 2018-05-07 NOTE — NURSING NOTE
"Chief Complaint   Patient presents with     Diabetes       Initial /88  Pulse 76  Temp 96.5  F (35.8  C)  Ht 5' 10\" (1.778 m)  SpO2 90% Estimated body mass index is 51.91 kg/(m^2) as calculated from the following:    Height as of 12/21/17: 5' 10\" (1.778 m).    Weight as of 12/22/17: 361 lb 12.4 oz (164.1 kg).  Medication Reconciliation: complete       Tonya Cartwright LPN  "

## 2018-05-07 NOTE — PROGRESS NOTES
SUBJECTIVE:                                                    Trey Washington is a 82 year old male who presents to clinic today for the following health issues:    Diabetes Follow-up    Patient is checking blood sugars: twice daily.    Blood sugar testing frequency justification: On insulin, frequency appropriate   Results are as follows:              postprandial after breakfast -          bedtime - 120-150    Diabetic concerns: None     Symptoms of hypoglycemia (low blood sugar): none     Paresthesias (numbness or burning in feet) or sores: No     Date of last diabetic eye exam: pt has appt on Wednesday    Hyperlipidemia Follow-Up      Rate your low fat/cholesterol diet?: good    Taking statin?  Yes, no muscle aches from statin    Other lipid medications/supplements?:  Fish oil/Omega 3,  without side effects    Hypertension Follow-up      Outpatient blood pressures are being checked at home.  Results are stable.    Low Salt Diet: low salt    BP Readings from Last 2 Encounters:   01/24/18 178/80   12/22/17 (!) 195/112     Hemoglobin A1C (%)   Date Value   12/21/2017 6.0   10/18/2017 5.6     LDL Cholesterol Calculated (mg/dL)   Date Value   04/03/2017 103 (H)   04/18/2016 94       Amount of exercise or physical activity: None    Problems taking medications regularly: No    Medication side effects: none    Diet: low salt        PROBLEMS TO ADD ON...    Problem list and histories reviewed & adjusted, as indicated.  Additional history: urinary dribbling at times.  Open wound on the umbilical hernia.  I was able to reduce it.  It is large.  Also urinary dribbling frequently.  Urgency also noted.      Patient Active Problem List   Diagnosis     Pure hypercholesterolemia     Chronic kidney disease, stage III (moderate)     Morbid (severe) obesity due to excess calories (H)     Obstructive sleep apnea     ACP (advance care planning)     Skin ulcer (H)     Advanced directives, counseling/discussion     Controlled  type 2 diabetes mellitus without complication, with long-term current use of insulin (H)     Type 2 diabetes mellitus with diabetic nephropathy, with long-term current use of insulin (H)     Essential hypertension with goal blood pressure less than 140/90     Convulsive syncope     Right hip pain     Vitamin B12 deficiency     Osteoarthrosis involving lower leg     Chronic pain syndrome     Physical deconditioning     Past Surgical History:   Procedure Laterality Date     COLONOSCOPY       ENT SURGERY      lanced abcess in left ear     GENITOURINARY SURGERY      removal of kidney stones     ORTHOPEDIC SURGERY      knee arthroscopy     ORTHOPEDIC SURGERY  2003    bilateral knee replacement     ORTHOPEDIC SURGERY      right knee arthroscopy     PHACOEMULSIFICATION WITH STANDARD INTRAOCULAR LENS IMPLANT  2/11/2014    Procedure: PHACOEMULSIFICATION WITH STANDARD INTRAOCULAR LENS IMPLANT;  CATARACT EXTRACTION WITH INTRA OCULAR LENS RIGHT;  Surgeon: Luis James MD;  Location: [facility] OR     PHACOEMULSIFICATION WITH STANDARD INTRAOCULAR LENS IMPLANT  3/11/2014    Procedure: PHACOEMULSIFICATION WITH STANDARD INTRAOCULAR LENS IMPLANT;  CATARACT EXTRACTION WITH INTRA OCULAR LENS LEFT  ;  Surgeon: Luis James MD;  Location: [facility] OR     TONSILLECTOMY & ADENOIDECTOMY  1942       Social History   Substance Use Topics     Smoking status: Former Smoker     Years: 10.00     Types: Cigars     Quit date: 9/29/1987     Smokeless tobacco: Never Used     Alcohol use No     Family History   Problem Relation Age of Onset     C.A.D. Father      MI     Other - See Comments Father      shell shocked in the war     DIABETES Paternal Grandmother          Current Outpatient Prescriptions   Medication Sig Dispense Refill     Acetaminophen (TYLENOL PO) Take 1,000 mg by mouth At Bedtime        allopurinol (ZYLOPRIM) 300 MG tablet Take 1 tablet (300 mg) by mouth daily 90 tablet 3     Ascorbic Acid (VITAMIN C PO) Take 500 mg by mouth daily         aspirin 81 MG tablet Take 1 tablet by mouth every other day        BASAGLAR 100 UNIT/ML injection 18 units daily subq 15 mL 3     blood glucose monitoring (KEO CONTOUR MONITOR) meter device kit Use to test blood sugars 2 times daily or as directed. 1 kit 0     blood glucose monitoring (KEO CONTOUR) test strip Use to test blood sugars 3 times daily or as directed. 300 each 3     blood glucose monitoring (KEO MICROLET) lancets USE AS DIRECTED TESTING BLOOD SUGAR THREE TIMES DAILY OR AS DIRECTED 300 each 1     Captopril (CAPOTEN PO) Take 50 mg by mouth 3 times daily       captopril (CAPOTEN) 50 MG tablet TAKE 2 TABLETS (100MG) BY MOUTH THREE TIMES DAILY 270 tablet 0     Cholecalciferol (VITAMIN D HIGH POTENCY) 1000 UNITS CAPS TAKE 1 CAPSULE BY MOUTH DAILY 90 capsule 3     cyanocolbalamin (VITAMIN  B-12) 500 MCG tablet TAKE 2 TABLETS (1,000 MCG) BY MOUTH DAILY 180 tablet 1     DOCQLACE 100 MG capsule TAKE 1 CAPSULE BY MOUTH TWICE DAILY 100 capsule 3     Elastic Bandages & Supports (T.E.D. BELOW KNEE/L-REGULAR) MISC Apply in am and take off in the evening 6 each 11     fish oil-omega-3 fatty acids 1000 MG capsule Take 1,000 mg by mouth daily       insulin aspart (NOVOLOG FLEXPEN) 100 UNIT/ML injection INJECT 8 UNITS SUBCUTANEOUS with lunch 9 mL 0     insulin pen needle (NOVOFINE) 30G X 8 MM USE 4 PENS NEEDLES DAILY OR AS DIRECTED 100 each 6     ketoconazole (NIZORAL) 2 % cream Apply topically 2 times daily as needed       levETIRAcetam 1000 MG TABS TAKE 1 TABLET BY MOUTH TWICE DAILY 60 tablet 3     metFORMIN (GLUCOPHAGE) 1000 MG tablet TAKE 1 TABLET BY MOUTH EVERY DAY WITH A MEAL 90 tablet 1     miconazole (MICATIN; MICRO GUARD) 2 % powder Apply topically 2 times daily as needed       order for DME Equipment being ordered: Oxygen 2 LPM per nasal cannula during the day, portable also 1 each 11     order for DME Equipment being ordered: Wheelchair 1 each 0     order for DME Equipment being ordered: Meplilex Transfer  "6 X 8 inch ([id number]) - disp 3;  Aquacel AG 4 X 5 ([id number]); disp 3 - Refills 6 1 each 6     order for DME 4x4 bulk SOFT gauze (disp 1 sleeve, refill x 5) 1 Units 0     Oyster Shell Calcium (OS-MARILIN 500 MG [place]. CA) 500 MG tablet TAKE 1 TABLET BY MOUTH TWICE DAILY 180 tablet 2     polyethylene glycol (MIRALAX/GLYCOLAX) powder TAKE 17 GRAMS BY MOUTH DAILY MIXED AS DIRECTED. 527 g 8     sertraline (ZOLOFT) 100 MG tablet TAKE 1 TABLET BY MOUTH ONCE DAILY 90 tablet 1     Silver-Carboxymethylcellulose (AQUACEL-AG EXTRA HYDROFIBER) 4\"X5\" PADS Externally apply 0.25 each topically every other day Use as directed by wound center 5 each 3     simvastatin (ZOCOR) 10 MG tablet TAKE 1 TABLET BY MOUTH NIGHTLY AT BEDTIME 90 tablet 0     terazosin (HYTRIN) 10 MG capsule TAKE 1 CAPSULE BY MOUTH NIGHTLY AT BEDTIME 90 capsule 2     traMADol (ULTRAM) 50 MG tablet TAKE 1 TABLET BY MOUTH EVERY 8 HOURS AS NEEDED FOR MODERATE PAIN 90 tablet 0     UNABLE TO FIND CPAP       vitamin E (TOCOPHEROL) 100 UNIT capsule Take 100 Units by mouth daily Uncertain of dose       [DISCONTINUED] insulin aspart (NOVOLOG PEN) 100 UNIT/ML injection Inject 1-7 Units Subcutaneous 3 times daily (before meals) Do Not give Correction Insulin if Pre-Meal BG less than 140.   For Pre-Meal  - 189=1 unit.   For Pre-Meal  - 239=2 units.   For Pre-Meal  - 289=3 units.   For Pre-Meal  - 339=4 units.   For Pre-Meal - 399=5 units.   For Pre-Meal -449=6 units  For Pre-Meal BG >=450 give 7 units.   To be given with prandial insulin, and based on pre-meal blood glucose.       [DISCONTINUED] insulin glargine (LANTUS SOLOSTAR) 100 UNIT/ML injection Inject 10 Units Subcutaneous At Bedtime       Allergies   Allergen Reactions     Hydrocodone      Intolerance       Penicillins Swelling       ROS:  Constitutional, HEENT, cardiovascular, pulmonary, gi and gu systems are negative, except as otherwise noted.    OBJECTIVE:                                  " "                  /88  Pulse 76  Temp 96.5  F (35.8  C)  Ht 5' 10\" (1.778 m)  SpO2 90%  There is no height or weight on file to calculate BMI.  GENERAL APPEARANCE: Alert, no acute distress  CV: regular rate and rhythm, no murmur, rub or gallop  RESP: lungs clear to auscultation bilaterally  ABDOMEN: normal bowel sounds, soft, nontender, no hepatosplenomegaly or other masses.  Large umbilical hernia which is reducible though it isn't easy, and he tolerates it ok.  Ulceration on the right side of it.    SKIN: no suspicious lesions or rashes to visualized skin  NEURO: Alert, oriented x 3, speech and mentation normal           ASSESSMENT/PLAN:                                                    1. Controlled type 2 diabetes mellitus without complication, with long-term current use of insulin (H)  Doing well.  Update labs and follow.  - Lipid Profile (Chol, Trig, HDL, LDL calc)  - Albumin Random Urine Quantitative with Creat Ratio  - Hemoglobin A1c  - Comprehensive metabolic panel (BMP + Alb, Alk Phos, ALT, AST, Total. Bili, TP)    2. Type 2 diabetes mellitus with diabetic nephropathy, with long-term current use of insulin (H)  As above.     3. Essential hypertension with goal blood pressure less than 140/90  Stable.  No change.      4. Open wound of abdomen, subsequent encounter  As above.  Discussed at some length.  Aid will do local cares with previous wound consult done and same materials, which are sent.   - Silver-Carboxymethylcellulose (AQUACEL-AG EXTRA HYDROFIBER) 4\"X5\" PADS; Externally apply 0.25 each topically every other day Use as directed by wound center  Dispense: 5 each; Refill: 3    5. Umbilical hernia with obstruction  Discussed at length.  I can reduce.  We are not doing surgery if we can avoid.      6. Urinary incontinence, post-void dribbling  As above.  Get UA.  If positive, treat.  If negative, consider flomax and we can do that over the phone.  I already talked about risk/benefit.    - *UA " reflex to Microscopic and Culture - [facility]          Moreno Desai MD  [facility]

## 2018-05-08 ENCOUNTER — MYC MEDICAL ADVICE (OUTPATIENT)
Dept: FAMILY MEDICINE | Facility: OTHER | Age: 83
End: 2018-05-08

## 2018-05-08 DIAGNOSIS — N40.0 BENIGN PROSTATIC HYPERPLASIA, UNSPECIFIED WHETHER LOWER URINARY TRACT SYMPTOMS PRESENT: Primary | ICD-10-CM

## 2018-05-08 RX ORDER — TAMSULOSIN HYDROCHLORIDE 0.4 MG/1
0.4 CAPSULE ORAL DAILY
Qty: 90 CAPSULE | Refills: 1 | Status: SHIPPED | OUTPATIENT
Start: 2018-05-08 | End: 2018-05-24

## 2018-05-08 ASSESSMENT — ANXIETY QUESTIONNAIRES: GAD7 TOTAL SCORE: 0

## 2018-05-08 ASSESSMENT — PATIENT HEALTH QUESTIONNAIRE - PHQ9: SUM OF ALL RESPONSES TO PHQ QUESTIONS 1-9: 0

## 2018-05-09 ENCOUNTER — TRANSFERRED RECORDS (OUTPATIENT)
Dept: HEALTH INFORMATION MANAGEMENT | Facility: CLINIC | Age: 83
End: 2018-05-09

## 2018-05-10 ENCOUNTER — MEDICAL CORRESPONDENCE (OUTPATIENT)
Dept: HEALTH INFORMATION MANAGEMENT | Facility: CLINIC | Age: 83
End: 2018-05-10

## 2018-05-15 DIAGNOSIS — E11.21 TYPE 2 DIABETES MELLITUS WITH DIABETIC NEPHROPATHY, WITH LONG-TERM CURRENT USE OF INSULIN (H): ICD-10-CM

## 2018-05-15 DIAGNOSIS — G89.4 CHRONIC PAIN SYNDROME: Chronic | ICD-10-CM

## 2018-05-15 DIAGNOSIS — R53.81 PHYSICAL DECONDITIONING: ICD-10-CM

## 2018-05-15 DIAGNOSIS — Z79.4 TYPE 2 DIABETES MELLITUS WITH DIABETIC NEPHROPATHY, WITH LONG-TERM CURRENT USE OF INSULIN (H): ICD-10-CM

## 2018-05-15 RX ORDER — TRAMADOL HYDROCHLORIDE 50 MG/1
TABLET ORAL
Qty: 90 TABLET | Refills: 0 | Status: SHIPPED | OUTPATIENT
Start: 2018-05-15 | End: 2018-06-25

## 2018-05-15 NOTE — TELEPHONE ENCOUNTER
tramadol      Last Written Prescription Date:  4/3/18  Last Fill Quantity: 90,   # refills: 0  Last Office Visit: 5/7/18  Future Office visit:       Routing refill request to provider for review/approval because:  Drug not on the [facility], [facility] or [facility] refill protocol or controlled substance

## 2018-05-24 ENCOUNTER — MYC MEDICAL ADVICE (OUTPATIENT)
Dept: FAMILY MEDICINE | Facility: OTHER | Age: 83
End: 2018-05-24

## 2018-05-24 DIAGNOSIS — N40.0 BENIGN PROSTATIC HYPERPLASIA, UNSPECIFIED WHETHER LOWER URINARY TRACT SYMPTOMS PRESENT: ICD-10-CM

## 2018-05-24 RX ORDER — TAMSULOSIN HYDROCHLORIDE 0.4 MG/1
0.8 CAPSULE ORAL DAILY
Qty: 90 CAPSULE | Refills: 1 | Status: SHIPPED | OUTPATIENT
Start: 2018-05-24 | End: 2018-01-01

## 2018-06-11 DIAGNOSIS — G40.909 SEIZURE DISORDER (H): ICD-10-CM

## 2018-06-12 ENCOUNTER — MYC MEDICAL ADVICE (OUTPATIENT)
Dept: FAMILY MEDICINE | Facility: OTHER | Age: 83
End: 2018-06-12

## 2018-06-12 DIAGNOSIS — N42.82 PROSTATITIS SYNDROME: Primary | ICD-10-CM

## 2018-06-12 RX ORDER — SULFAMETHOXAZOLE/TRIMETHOPRIM 800-160 MG
1 TABLET ORAL 2 TIMES DAILY
Qty: 28 TABLET | Refills: 0 | Status: SHIPPED | OUTPATIENT
Start: 2018-06-12 | End: 2018-01-01

## 2018-06-12 NOTE — TELEPHONE ENCOUNTER
I want to treat possible prostate infection with how quickly this came on.  Bactrim sent.  Visit with ongoing concerns.  I will likely need to get urology if this doesn't work but I would want to discuss with he and Elen.  Thanks. Moreno Desai

## 2018-06-12 NOTE — TELEPHONE ENCOUNTER
levETIRAcetam 1000 MG TABS        Last Written Prescription Date:  03/21/2018  Last Fill Quantity: 60,   # refills: 3  Last Office Visit: 05/07/2018  Future Office visit:       Routing refill request to provider for review/approval because:

## 2018-06-13 RX ORDER — LEVETIRACETAM 1000 MG/1
TABLET ORAL
Qty: 60 TABLET | Refills: 0 | Status: SHIPPED | OUTPATIENT
Start: 2018-06-13 | End: 2018-01-01

## 2018-06-25 DIAGNOSIS — E56.9 VITAMIN DEFICIENCY: ICD-10-CM

## 2018-06-25 DIAGNOSIS — R53.81 PHYSICAL DECONDITIONING: ICD-10-CM

## 2018-06-25 DIAGNOSIS — Z79.4 TYPE 2 DIABETES MELLITUS WITH DIABETIC NEPHROPATHY, WITH LONG-TERM CURRENT USE OF INSULIN (H): ICD-10-CM

## 2018-06-25 DIAGNOSIS — G89.4 CHRONIC PAIN SYNDROME: Chronic | ICD-10-CM

## 2018-06-25 DIAGNOSIS — E11.21 TYPE 2 DIABETES MELLITUS WITH DIABETIC NEPHROPATHY, WITH LONG-TERM CURRENT USE OF INSULIN (H): ICD-10-CM

## 2018-06-26 NOTE — TELEPHONE ENCOUNTER
Controlled Substance Refill Request for Tramadol  Problem List Complete:  Yes    Last Written Prescription Date:  5/15/18  Last Fill Quantity: 90,   # refills: 0    Last Office Visit with [facility] primary care provider: 5/7/18  Chronic pain syndrome (Chronic)         Problem Detail      Noted:  12/19/2017      Priority:  Medium      Overview Signed 12/19/2017  8:08 AM by Annie Garnett RN     Patient is followed by Moreno Desai MD for ongoing prescription of pain medication.  All refills should only be approved by this provider, or covering partner.     Medication(s): Ultram 50mg.   Maximum quantity per month: #90  Clinic visit frequency required: Q 3 months      Controlled substance agreement:  Encounter-Level CSA - 10/18/2017:            Controlled Substance Agreement - Scan on 10/19/2017 11:09 AM : CONTROLLED SUBSTANCE AGREEMENT (below)            Pain Clinic evaluation in the past: No     DIRE Total Score(s):  No flowsheet data found.     Last [facility] website verification:  done on 12.19.17   [email or web address]        Processing:  Fax Rx to [facility]

## 2018-06-26 NOTE — TELEPHONE ENCOUNTER
Vit B12      Last Written Prescription Date:  7/27/2017  Last Fill Quantity: 180,   # refills: 1  /Last Office Visit: 5/07/2018  Future Office visit:         Metformin      Last Written Prescription Date:  1/24/2018  Last Fill Quantity: 90,   # refills: 1  Last Office Visit: 5/07/2018  Future Office visit:         Tramadol       Last Written Prescription Date:  5/15/2018  Last Fill Quantity: 90,   # refills: 0  Last Office Visit: 5/07/2018  Future Office visit:

## 2018-07-07 NOTE — ED NOTES
Bed: ED03  Expected date:   Expected time:   Means of arrival:   Comments:  [facility]

## 2018-07-07 NOTE — ED AVS SNAPSHOT
[facility]    [address] [address]    Phone:  [phone]                                       Trey Washington   MRN: [id number]    Department:  [facility]   Date of Visit:  7/7/2018           Patient Information     Date Of Birth          1935        Your diagnoses for this visit were:     Umbilical hernia without obstruction and without gangrene        You were seen by Vida Squires MD.      Follow-up Information     Follow up with Moreno Desai MD In 3 days.    Specialty:  Family Practice    Why:  Follow up ED visit    Contact information:    [address] [place] [address] [phone]          Discharge Instructions       What to expect when you have contrast    During your exam, we will inject  contrast  into your vein or artery. (Contrast is a clear liquid with iodine in it. It shows up on X-rays.)    You may feel warm or hot. You may have a metal taste in your mouth and a slight upset stomach. You may also feel pressure near the kidneys and bladder. These effects will last about 1 to 3 minutes.    Please tell us if you have:    Sneezing     Itching    Hives     Swelling in the face    A hoarse voice    Breathing problems    Other new symptoms    Serious problems are rare.  They may include:    Irregular heartbeat     Seizures    Kidney failure              Tissue damage    Shock      Death    If you have any problems during the exam, we  will treat them right away.    When you get home    Call your hospital if you have any new symptoms in the next 2 days, like hives or swelling. (Phone numbers are at the bottom of this page.) Or call your family doctor.     If you have wheezing or trouble breathing, call 911.    Self-care  -Drink at least 4 extra glasses of water today.   This reduces the stress on your kidneys.  -Keep taking your regular medicines.    The contrast will pass out of your body in your  Urine(pee). This will  "happen in the next 24 hours. You  will not feel this. Your urine will not  change color.    If you have kidney problems or take metformin    Drink 4 to 8 large glasses of water for the next  2 days, if you are not on a fluid restriction.    ?If you take metformin (Glucophage or Glucovance) for diabetes, keep taking it.      ?Your kidney function tests are abnormal.  If you take Metformin, do not take it for 48 hours. Please go to your clinic for a blood test within 3 days after your exam before the restarting this medicine.     (Note to provider:please give patient prescription for lab tests.)    ?Special instructions:     I have read and understand the above information.    Patient Sign Here:______________________________________Date:________Time:______    Staff Sign Here:________________________________________Date:_______Time:______      Radiology Departments:     ?[facility]: [phone] ?[facility]: [phone]     ?[place]: [phone] ?[facility]:[phone]      ?[facility]: [phone]  ?[facility]: [phone]  ?[facility]:[phone]    ?[facility]:[phone]  ?[facility]:[phone]    Discharge References/Attachments     HERNIA, WHAT IS A (ENGLISH)         Review of your medicines      Our records show that you are taking the medicines listed below. If these are incorrect, please call your family doctor or clinic.        Dose / Directions Last dose taken    allopurinol 300 MG tablet   Commonly known as:  ZYLOPRIM   Dose:  1 tablet   Quantity:  90 tablet        Take 1 tablet (300 mg) by mouth daily   Refills:  3        AQUACEL-AG EXTRA HYDROFIBER 4\"X5\" Pads   Dose:  0.25 each   Quantity:  5 each        Externally apply 0.25 each topically every other day Use as directed by wound center   Refills:  3        aspirin 81 MG tablet   Dose:  1 tablet        Take 1 tablet by mouth every other day   Refills:  0        BASAGLAR 100 UNIT/ML injection   Quantity:  15 mL        18 units daily subq "   Refills:  3        blood glucose monitoring lancets   Quantity:  300 each        USE AS DIRECTED TESTING BLOOD SUGAR THREE TIMES DAILY OR AS DIRECTED   Refills:  1        blood glucose monitoring meter device kit   Quantity:  1 kit        Use to test blood sugars 2 times daily or as directed.   Refills:  0        blood glucose monitoring test strip   Commonly known as:  KEO CONTOUR   Quantity:  300 each        Use to test blood sugars 3 times daily or as directed.   Refills:  3        * CAPOTEN PO   Dose:  50 mg   Indication:  High Blood Pressure Disorder        Take 50 mg by mouth 3 times daily   Refills:  0        * captopril 50 MG tablet   Commonly known as:  CAPOTEN   Quantity:  270 tablet        TAKE 2 TABLETS (100MG) BY MOUTH THREE TIMES DAILY   Refills:  0        cyanocolbalamin 500 MCG tablet   Commonly known as:  vitamin  B-12   Quantity:  180 tablet        TAKE 2 TABLETS BY MOUTH EVERY DAY   Refills:  1        DOCQLACE 100 MG capsule   Quantity:  100 capsule   Generic drug:  docusate sodium        TAKE 1 CAPSULE BY MOUTH TWICE DAILY   Refills:  3        fish oil-omega-3 fatty acids 1000 MG capsule   Dose:  1000 mg        Take 1,000 mg by mouth daily   Refills:  0        insulin aspart 100 UNIT/ML injection   Commonly known as:  NovoLOG FLEXPEN   Quantity:  9 mL        INJECT 8 UNITS SUBCUTANEOUS with lunch   Refills:  0        insulin pen needle 30G X 8 MM   Commonly known as:  NOVOFINE   Quantity:  100 each        USE 4 PENS NEEDLES DAILY OR AS DIRECTED   Refills:  6        ketoconazole 2 % cream   Commonly known as:  NIZORAL        Apply topically 2 times daily as needed   Refills:  0        levETIRAcetam 1000 MG Tabs   Quantity:  60 tablet        TAKE 1 TABLET BY MOUTH TWICE DAILY   Refills:  0        metFORMIN 1000 MG tablet   Commonly known as:  GLUCOPHAGE   Quantity:  90 tablet        TAKE 1 TABLET BY MOUTH ONCE DAILY WITH A MEAL   Refills:  1        miconazole 2 % powder   Commonly known as:   MICATIN; MICRO GUARD        Apply topically 2 times daily as needed   Refills:  0        * order for DME   Quantity:  1 each        Equipment being ordered: Oxygen 2 LPM per nasal cannula during the day, portable also   Refills:  11        * order for DME   Quantity:  1 each        Equipment being ordered: Wheelchair   Refills:  0        * order for DME   Quantity:  1 each        Equipment being ordered: Meplilex Transfer 6 X 8 inch ([id number]) - disp 3;  Aquacel AG 4 X 5 ([id number]); disp 3 - Refills 6   Refills:  6        order for DME   Quantity:  1 Units        4x4 bulk SOFT gauze (disp 1 sleeve, refill x 5)   Refills:  0        oyster shell calcium 500 MG tablet   Commonly known as:  OS-Morris 500 mg [place]. Ca   Quantity:  180 tablet        TAKE 1 TABLET BY MOUTH TWICE DAILY   Refills:  2        polyethylene glycol powder   Commonly known as:  MIRALAX/GLYCOLAX   Quantity:  527 g        TAKE 17 GRAMS BY MOUTH DAILY MIXED AS DIRECTED.   Refills:  8        sertraline 100 MG tablet   Commonly known as:  ZOLOFT   Quantity:  90 tablet        TAKE 1 TABLET BY MOUTH ONCE DAILY   Refills:  1        simvastatin 10 MG tablet   Commonly known as:  ZOCOR   Quantity:  90 tablet        TAKE 1 TABLET BY MOUTH NIGHTLY AT BEDTIME   Refills:  0        sulfamethoxazole-trimethoprim 800-160 MG per tablet   Commonly known as:  BACTRIM DS/SEPTRA DS   Dose:  1 tablet   Quantity:  28 tablet        Take 1 tablet by mouth 2 times daily   Refills:  0        T.E.D. BELOW KNEE/L-REGULAR Misc   Quantity:  6 each        Apply in am and take off in the evening   Refills:  11        tamsulosin 0.4 MG capsule   Commonly known as:  FLOMAX   Dose:  0.8 mg   Quantity:  90 capsule        Take 2 capsules (0.8 mg) by mouth daily   Refills:  1        terazosin 10 MG capsule   Commonly known as:  HYTRIN   Quantity:  90 capsule        TAKE 1 CAPSULE BY MOUTH NIGHTLY AT BEDTIME   Refills:  2        traMADol 50 MG tablet   Commonly known as:  ULTRAM   Quantity:  90  tablet        TAKE ONE TABLET BY MOUTH EVERY 8 HOURS AS NEEDED FOR MODERATE PAIN.   Refills:  0        TYLENOL PO   Dose:  1000 mg        Take 1,000 mg by mouth At Bedtime   Refills:  0        UNABLE TO FIND        CPAP   Refills:  0        VITAMIN C PO   Dose:  500 mg        Take 500 mg by mouth daily   Refills:  0        VITAMIN D HIGH POTENCY 1000 units Caps   Quantity:  90 capsule        TAKE 1 CAPSULE BY MOUTH DAILY   Refills:  3        vitamin E 100 UNIT capsule   Commonly known as:  TOCOPHEROL   Dose:  100 Units        Take 100 Units by mouth daily Uncertain of dose   Refills:  0        * Notice:  This list has 5 medication(s) that are the same as other medications prescribed for you. Read the directions carefully, and ask your doctor or other care provider to review them with you.            Procedures and tests performed during your visit     CBC with platelets differential    CT Abdomen Pelvis w Contrast    Comprehensive metabolic panel    Lactic acid    Lipase    Peripheral IV: Standard    Vital signs      Orders Needing Specimen Collection     None      Pending Results     Date and Time Order Name Status Description    7/7/2018 1243 CT Abdomen Pelvis w Contrast In process             Pending Culture Results     No orders found from 7/5/2018 to 7/8/2018.            Thank you for choosing [facility]       Thank you for choosing [facility] for your care. Our goal is always to provide you with excellent care. Hearing back from our patients is one way we can continue to improve our services. Please take a few minutes to complete the written survey that you may receive in the mail after you visit with us. Thank you!        [facility] Information     [facility] gives you secure access to your electronic health record. If you see a primary care provider, you can also send messages to your care team and make appointments. If you have questions, please call your primary care clinic.  If you do not have a primary care  provider, please call [phone] and they will assist you.        Care EveryWhere ID     This is your Care EveryWhere ID. This could be used by other organizations to access your [facility] medical records  [id number]        Equal Access to Services     WISAM MARTÍNEZ: Lashae Joyner, cydney nieto, qarobertota kaalmagricelda macias, de martínez. So [facility] [phone].    ATENCIÓN: Si habla español, tiene a cabrera disposición servicios gratuitos de asistencia lingüística. Llame al [phone].    We comply with applicable federal civil rights laws and [place] laws. We do not discriminate on the basis of race, color, national origin, age, disability, sex, sexual orientation, or gender identity.            After Visit Summary       This is your record. Keep this with you and show to your community pharmacist(s) and doctor(s) at your next visit.

## 2018-07-07 NOTE — DISCHARGE INSTRUCTIONS
What to expect when you have contrast    During your exam, we will inject  contrast  into your vein or artery. (Contrast is a clear liquid with iodine in it. It shows up on X-rays.)    You may feel warm or hot. You may have a metal taste in your mouth and a slight upset stomach. You may also feel pressure near the kidneys and bladder. These effects will last about 1 to 3 minutes.    Please tell us if you have:    Sneezing     Itching    Hives     Swelling in the face    A hoarse voice    Breathing problems    Other new symptoms    Serious problems are rare.  They may include:    Irregular heartbeat     Seizures    Kidney failure              Tissue damage    Shock      Death    If you have any problems during the exam, we  will treat them right away.    When you get home    Call your hospital if you have any new symptoms in the next 2 days, like hives or swelling. (Phone numbers are at the bottom of this page.) Or call your family doctor.     If you have wheezing or trouble breathing, call 911.    Self-care  -Drink at least 4 extra glasses of water today.   This reduces the stress on your kidneys.  -Keep taking your regular medicines.    The contrast will pass out of your body in your  Urine(pee). This will happen in the next 24 hours. You  will not feel this. Your urine will not  change color.    If you have kidney problems or take metformin    Drink 4 to 8 large glasses of water for the next  2 days, if you are not on a fluid restriction.    ?If you take metformin (Glucophage or Glucovance) for diabetes, keep taking it.      ?Your kidney function tests are abnormal.  If you take Metformin, do not take it for 48 hours. Please go to your clinic for a blood test within 3 days after your exam before the restarting this medicine.     (Note to provider:please give patient prescription for lab tests.)    ?Special instructions:     I have read and understand the above information.    Patient Sign  Here:______________________________________Date:________Time:______    Staff Sign Here:________________________________________Date:_______Time:______      Radiology Departments:     ?[facility]: [phone] ?[place]: [phone]     ?[place]: [phone] ?[place]:[phone]      ?[facility]: [phone]  ?[place]: [phone]  ?[place]:[phone]    ?[facility] [place]:[phone]  ?[facility]:[phone]

## 2018-07-07 NOTE — ED NOTES
"Pt arrived via ambulance for c/o, \"my hernia pain, when I lay down it gets better, when I sit up it hurts worse. It started 2 days ago getting real bad.\"  No OTC medications today.   "

## 2018-07-07 NOTE — ED AVS SNAPSHOT
[facility]    [address] [place] [address]    [place] [address]    Phone:  [phone]                                       Trey Washington   MRN: [id number]    Department[facility]  [facility]   Date of Visit:  7/7/2018           After Visit Summary Signature Page     I have received my discharge instructions, and my questions have been answered. I have discussed any challenges I see with this plan with the nurse or doctor.    ..........................................................................................................................................  Patient/Patient Representative Signature      ..........................................................................................................................................  Patient Representative Print Name and Relationship to Patient    ..................................................               ................................................  Date                                            Time    ..........................................................................................................................................  Reviewed by Signature/Title    ...................................................              ..............................................  Date                                                            Time           CC:  Ruma COEL Bruna is here today for  Follow up labs, MWV .    Medications: medications verified and updated  Refills needed today?  NO  denies Latex allergy or sensitivity  Reviewed overdue health maintenance topics with patient.    Health Maintenance Summary     Topic Due On Due Status Completed On Postpone Until Reason    Immunization-Zoster Oct 23, 1986 Overdue       Immunization - Pneumococcal  Completed Sep 2, 2015      Medicare Wellness Visit Sep 19, 2017 Due On Sep 19, 2016      IMMUNIZATION - DTaP/Tdap/Td Oct 21, 2004 Overdue Oct 20, 2004      Immunization-Influenza Sep 1, 2017 Postponed Oct 14, 2016 Sep 21, 2017 Insufficient Supply          Patient is due for topics as listed above, she wishes to discuss with provider .

## 2018-07-07 NOTE — CONSULTS
[facility]    General Surgery Consultation    Date of Admission:  7/7/2018    Assessment & Plan   Trey Washington is a 82 year old male who was admitted on 7/7/2018. I was asked to see the patient for umbilical hernia.      At this time the hernia is freely reducible with no evidence of incarceration or strangulation. After reduction of his hernia he said he was feeling better. As his hernia is freely reducible there is no urgent or emergent surgical indications. I discussed with the patient that if he was to have worsening symptoms that he should return to the hospital. All questions and concerns were addressed.      Mina Rodriguez    Code Status    Prior    Reason for Consult   Reason for consult: I was asked by Dr. Vogt  to evaluate this patient for umbilical hernia.    Primary Care Physician   Moreno Desai    Chief Complaint   Abdominal pain from his hernia    History is obtained from the patient    History of Present Illness   Trey Washington is a 82 year old male who presents with abdominal pain. He said that two days ago he started to develop peterson-umbilical abdominal pain. He has a known umbilical hernia. He says typically he may get pain that lasts 1-2 hours but this episode it has lasted a couple of days. He has had some nausea with no emesis. He continues to pass flatus and his last bowel movement was two days ago. He has not had any chest pain or shortness of breath. He states that when he is up on his feet that is when the pain is at its worst, 8 out of 10, and then laying down it will reduce to about a 6. Due to the pain ongoing he came to the ER for evaluation.     Past Medical History   I have reviewed this patient's medical history and updated it with pertinent information if needed.   Past Medical History:   Diagnosis Date     Chronic kidney disease, stage III (moderate)      Obesity, unspecified      Obstructive sleep apnea (adult) (pediatric)      Osteoarthrosis,  unspecified whether generalized or localized, lower leg      Other abnormal blood chemistry     hyperuricemia     Other B-complex deficiencies      Other choreas     Hemiballism     Pure hypercholesterolemia      Skin ulcer (H)      Type II or unspecified type diabetes mellitus without mention of complication, not stated as uncontrolled      Unspecified essential hypertension        Past Surgical History   I have reviewed this patient's surgical history and updated it with pertinent information if needed.  Past Surgical History:   Procedure Laterality Date     COLONOSCOPY       ENT SURGERY      lanced abcess in left ear     GENITOURINARY SURGERY      removal of kidney stones     ORTHOPEDIC SURGERY      knee arthroscopy     ORTHOPEDIC SURGERY  2003    bilateral knee replacement     ORTHOPEDIC SURGERY      right knee arthroscopy     PHACOEMULSIFICATION WITH STANDARD INTRAOCULAR LENS IMPLANT  2014    Procedure: PHACOEMULSIFICATION WITH STANDARD INTRAOCULAR LENS IMPLANT;  CATARACT EXTRACTION WITH INTRA OCULAR LENS RIGHT;  Surgeon: Luis James MD;  Location: [facility]     PHACOEMULSIFICATION WITH STANDARD INTRAOCULAR LENS IMPLANT  3/11/2014    Procedure: PHACOEMULSIFICATION WITH STANDARD INTRAOCULAR LENS IMPLANT;  CATARACT EXTRACTION WITH INTRA OCULAR LENS LEFT  ;  Surgeon: Luis James MD;  Location: [facility] OR     TONSILLECTOMY & ADENOIDECTOMY         Prior to Admission Medications   Prior to Admission Medications   Prescriptions Last Dose Informant Patient Reported? Taking?   Acetaminophen (TYLENOL PO) 2018 at Unknown time  Yes Yes   Sig: Take 1,000 mg by mouth At Bedtime    Ascorbic Acid (VITAMIN C PO) 2018 at Unknown time  Yes Yes   Sig: Take 500 mg by mouth daily    BASAGLAR 100 UNIT/ML injection 2018 at Unknown time  No Yes   Si units daily subq   Captopril (CAPOTEN PO) 2018 at Unknown time  Yes Yes   Sig: Take 50 mg by mouth 3 times daily   Cholecalciferol (VITAMIN D HIGH POTENCY)  "1000 UNITS CAPS   No No   Sig: TAKE 1 CAPSULE BY MOUTH DAILY   DOCQLACE 100 MG capsule 7/7/2018 at Unknown time  No Yes   Sig: TAKE 1 CAPSULE BY MOUTH TWICE DAILY   Elastic Bandages & Supports (T.E.D. BELOW KNEE/L-REGULAR) MISC   No No   Sig: Apply in am and take off in the evening   Oyster Shell Calcium (OS-MARILIN 500 MG [place]. [place]) 500 MG tablet 7/7/2018 at Unknown time  No Yes   Sig: TAKE 1 TABLET BY MOUTH TWICE DAILY   Silver-Carboxymethylcellulose (AQUACEL-AG EXTRA HYDROFIBER) 4\"X5\" PADS 7/6/2018 at Unknown time  No Yes   Sig: Externally apply 0.25 each topically every other day Use as directed by wound center   UNABLE TO FIND   Yes No   Sig: CPAP   allopurinol (ZYLOPRIM) 300 MG tablet 7/7/2018 at Unknown time  No Yes   Sig: Take 1 tablet (300 mg) by mouth daily   aspirin 81 MG tablet 7/7/2018 at Unknown time  Yes Yes   Sig: Take 1 tablet by mouth every other day    blood glucose monitoring (KEO CONTOUR MONITOR) meter device kit   No No   Sig: Use to test blood sugars 2 times daily or as directed.   blood glucose monitoring (KEO CONTOUR) test strip   No No   Sig: Use to test blood sugars 3 times daily or as directed.   blood glucose monitoring (KEO MICROLET) lancets   No No   Sig: USE AS DIRECTED TESTING BLOOD SUGAR THREE TIMES DAILY OR AS DIRECTED   captopril (CAPOTEN) 50 MG tablet 7/7/2018 at Unknown time  No Yes   Sig: TAKE 2 TABLETS (100MG) BY MOUTH THREE TIMES DAILY   Patient taking differently: TAKE 1 TABLETS (100MG) BY MOUTH THREE TIMES DAILY   cyanocolbalamin (VITAMIN  B-12) 500 MCG tablet 7/7/2018 at Unknown time  No Yes   Sig: TAKE 2 TABLETS BY MOUTH EVERY DAY   fish oil-omega-3 fatty acids 1000 MG capsule 7/7/2018 at Unknown time  Yes Yes   Sig: Take 1,000 mg by mouth daily   insulin aspart (NOVOLOG FLEXPEN) 100 UNIT/ML injection 7/6/2018 at Unknown time  No Yes   Sig: INJECT 8 UNITS SUBCUTANEOUS with lunch   insulin pen needle (NOVOFINE) 30G X 8 MM   No No   Sig: USE 4 PENS NEEDLES DAILY OR AS " DIRECTED   ketoconazole (NIZORAL) 2 % cream 7/6/2018 at Unknown time  Yes Yes   Sig: Apply topically 2 times daily as needed   levETIRAcetam 1000 MG TABS   No No   Sig: TAKE 1 TABLET BY MOUTH TWICE DAILY   metFORMIN (GLUCOPHAGE) 1000 MG tablet 7/7/2018 at Unknown time  No Yes   Sig: TAKE 1 TABLET BY MOUTH ONCE DAILY WITH A MEAL   miconazole (MICATIN; MICRO GUARD) 2 % powder 7/6/2018 at Unknown time  Yes Yes   Sig: Apply topically 2 times daily as needed   order for DME   No No   Sig: Equipment being ordered: Oxygen 2 LPM per nasal cannula during the day, portable also   order for DME   No No   Sig: Equipment being ordered: Wheelchair   order for DME   No No   Sig: Equipment being ordered: Meplilex Transfer 6 X 8 inch ([id number]) - disp 3;  Aquacel AG 4 X 5 ([id number]); disp 3 - Refills 6   order for DME   No No   Sig: 4x4 bulk SOFT gauze (disp 1 sleeve, refill x 5)   polyethylene glycol (MIRALAX/GLYCOLAX) powder 7/6/2018 at Unknown time  No Yes   Sig: TAKE 17 GRAMS BY MOUTH DAILY MIXED AS DIRECTED.   sertraline (ZOLOFT) 100 MG tablet 7/7/2018 at Unknown time  No Yes   Sig: TAKE 1 TABLET BY MOUTH ONCE DAILY   simvastatin (ZOCOR) 10 MG tablet 7/6/2018 at Unknown time  No Yes   Sig: TAKE 1 TABLET BY MOUTH NIGHTLY AT BEDTIME   sulfamethoxazole-trimethoprim (BACTRIM DS/SEPTRA DS) 800-160 MG per tablet Unknown at Unknown time  No No   Sig: Take 1 tablet by mouth 2 times daily   tamsulosin (FLOMAX) 0.4 MG capsule 7/7/2018 at Unknown time  No Yes   Sig: Take 2 capsules (0.8 mg) by mouth daily   terazosin (HYTRIN) 10 MG capsule 7/6/2018 at Unknown time  No Yes   Sig: TAKE 1 CAPSULE BY MOUTH NIGHTLY AT BEDTIME   traMADol (ULTRAM) 50 MG tablet 7/6/2018 at Unknown time  No Yes   Sig: TAKE ONE TABLET BY MOUTH EVERY 8 HOURS AS NEEDED FOR MODERATE PAIN.   vitamin E (TOCOPHEROL) 100 UNIT capsule 7/7/2018 at Unknown time  Yes Yes   Sig: Take 100 Units by mouth daily Uncertain of dose      Facility-Administered Medications: None      Allergies   Allergies   Allergen Reactions     Hydrocodone      Intolerance       Penicillins Swelling       Social History   I have reviewed this patient's social history and updated it with pertinent information if needed. rTey Washington  reports that he quit smoking about 30 years ago. His smoking use included Cigars. He quit after 10.00 years of use. He has never used smokeless tobacco. He reports that he does not drink alcohol or use illicit drugs.    Family History   I have reviewed this patient's family history and updated it with pertinent information if needed.   Family History   Problem Relation Age of Onset     C.A.D. Father      MI     Other - See Comments Father      shell shocked in the war     Diabetes Paternal Grandmother        Review of Systems   ROS as per HPI    Physical Exam       BP: (!) 149/102   Heart Rate: 79 Resp: 16 SpO2: 96 % O2 Device: Nasal cannula Oxygen Delivery: 3 LPM  Vital Signs with Ranges  Heart Rate:  [67-99] 79  Resp:  [16] 16  BP: (149-181)/() 149/102  SpO2:  [95 %-100 %] 96 %  0 lbs 0 oz    Constitutional: awake, alert, cooperative, no apparent distress, and appears stated age  Eyes: lids and lashes normal and pupils equal, round and reactive to light  ENT: normocepalic, without obvious abnormality  Hematologic / Lymphatic: no cervical lymphadenopathy and no supraclavicular lymphadenopathy  Respiratory: No increased work of breathing, good air exchange, clear to auscultation bilaterally, no crackles or wheezing  Cardiovascular: regular rate and rhythm and normal S1 and S2  GI: morbidly obese, large umbilical hernia that with gentle pressure and in Trendelenburg position the hernia freely reduces. The contents will re-herniate and then reduce easily. Minimal to no tenderness with reduction of hernia contents. Umbilical skin has a small area of erythema  Skin: no bruising or bleeding  Neurologic: Awake, alert, oriented to name, place and time.      Data   Results  for orders placed or performed during the hospital encounter of 07/07/18 (from the past 24 hour(s))   CBC with platelets differential   Result Value Ref Range    WBC 8.5 4.0 - 11.0 10e9/L    RBC Count 4.09 (L) 4.4 - 5.9 10e12/L    Hemoglobin 13.3 13.3 - 17.7 g/dL    Hematocrit 42.5 40.0 - 53.0 %     (H) 78 - 100 fl    MCH 32.5 26.5 - 33.0 pg    MCHC 31.3 (L) 31.5 - 36.5 g/dL    RDW 13.5 10.0 - 15.0 %    Platelet Count 129 (L) 150 - 450 10e9/L   Comprehensive metabolic panel   Result Value Ref Range    Sodium 142 133 - 144 mmol/L    Potassium 4.8 3.4 - 5.3 mmol/L    Chloride 109 94 - 109 mmol/L    Carbon Dioxide 22 20 - 32 mmol/L    Anion Gap 11 3 - 14 mmol/L    Glucose 166 (H) 70 - 99 mg/dL    Urea Nitrogen 34 (H) 7 - 30 mg/dL    Creatinine 1.52 (H) 0.66 - 1.25 mg/dL    GFR Estimate 44 (L) >60 mL/min/1.7m2    GFR Estimate If Black 53 (L) >60 mL/min/1.7m2    Calcium 9.0 8.5 - 10.1 mg/dL    Bilirubin Total 0.3 0.2 - 1.3 mg/dL    Albumin 3.2 (L) 3.4 - 5.0 g/dL    Protein Total 6.7 (L) 6.8 - 8.8 g/dL    Alkaline Phosphatase 47 40 - 150 U/L    ALT 13 0 - 70 U/L    AST 11 0 - 45 U/L   Lipase   Result Value Ref Range    Lipase 120 73 - 393 U/L   Lactic acid   Result Value Ref Range    Lactic Acid 1.6 0.4 - 2.0 mmol/L

## 2018-07-07 NOTE — ED NOTES
Pt's caregiver Elen on the phone requesting update. Pt would like results called to elen.  Will call elen back after results are completed.

## 2018-07-07 NOTE — ED NOTES
CT notified that first liter of saline is completed, and pt is ready for CT. Pt will get a second liter when he returns,

## 2018-07-07 NOTE — ED NOTES
Discharge instructions reviewed with patient and patient verbalized understanding. Pt wheeled to exit. Care giver monik updated and coming to get pt.

## 2018-07-07 NOTE — ED PROVIDER NOTES
History     Chief Complaint   Patient presents with     Abdominal Pain     states he has an umbilical hernia that is hurting him. last BM Thursday. some nausea but no vomiting      HPI  Trey Washington is a 82 year old male who presents to the emergency room with abdominal pain.  He has an umbilical hernia that he has had for some time, however on Thursday it started hurting more.  That is also the day he had his last bowel movement.  He has had some nausea, no vomiting, he is having difficulty finding a position of comfort.  Her knee has been evaluated by Dr. Moreno Desai, however he was thought to be a poor surgical candidate due to his weight and diabetes.  Today the pain is 9 of 10 and radiates laterally to both sides of the abdomen.  He has a large protuberant abdomen.    Problem List:    Patient Active Problem List    Diagnosis Date Noted     Physical deconditioning 12/21/2017     Priority: Medium     Chronic pain syndrome 12/19/2017     Priority: Medium     Patient is followed by Moreno Desai MD for ongoing prescription of pain medication.  All refills should only be approved by this provider, or covering partner.    Medication(s): Ultram 50mg.   Maximum quantity per month: #90  Clinic visit frequency required: Q 3 months     Controlled substance agreement:  Encounter-Level CSA - 10/18/2017:          Controlled Substance Agreement - Scan on 10/19/2017 11:09 AM : CONTROLLED SUBSTANCE AGREEMENT (below)              Pain Clinic evaluation in the past: No    DIRE Total Score(s):  No flowsheet data found.    Last [facility] website verification:  done on 12.19.17   [email or web address]         Osteoarthrosis involving lower leg 08/04/2017     Priority: Medium     Overview:   status post total knee arthroplasties       Right hip pain 07/29/2017     Priority: Medium     Convulsive syncope 03/12/2017     Priority: Medium     Essential hypertension with goal blood pressure less than 140/90 10/11/2016      Priority: Medium     Advanced directives, counseling/discussion 10/05/2016     Priority: Medium     Advance Care Planning 10/5/2016: ACP Review of Chart / Resources Provided:  Reviewed chart for advance care plan.  Trey Washington has no plan or code status on file. Discussed available resources and provided with information. Pt has paperwork at home but has not completed it yet.  Added by Josefa Amor             Controlled type 2 diabetes mellitus without complication, with long-term current use of insulin (H) 10/05/2016     Priority: Medium     Type 2 diabetes mellitus with diabetic nephropathy, with long-term current use of insulin (H) 10/05/2016     Priority: Medium     Skin ulcer (H)      Priority: Medium     ACP (advance care planning) 04/18/2016     Priority: Medium     Advance Care Planning 4/18/2016: ACP Review of Chart / Resources Provided:  Reviewed chart for advance care plan.  Trey Washington has been provided information and resources to begin or update their advance care plan.  Added by Esperanza Reynoso             Pure hypercholesterolemia      Priority: Medium     Chronic kidney disease, stage III (moderate)      Priority: Medium     Morbid (severe) obesity due to excess calories (H)      Priority: Medium     Problem list name updated by automated process. Provider to review       Obstructive sleep apnea      Priority: Medium     Problem list name updated by automated process. Provider to review       Vitamin B12 deficiency 08/22/2011     Priority: Medium        Past Medical History:    Past Medical History:   Diagnosis Date     Chronic kidney disease, stage III (moderate)      Obesity, unspecified      Obstructive sleep apnea (adult) (pediatric)      Osteoarthrosis, unspecified whether generalized or localized, lower leg      Other abnormal blood chemistry      Other B-complex deficiencies      Other choreas      Pure hypercholesterolemia      Skin ulcer (H)      Type II or unspecified  type diabetes mellitus without mention of complication, not stated as uncontrolled      Unspecified essential hypertension        Past Surgical History:    Past Surgical History:   Procedure Laterality Date     COLONOSCOPY       ENT SURGERY      lanced abcess in left ear     GENITOURINARY SURGERY      removal of kidney stones     ORTHOPEDIC SURGERY      knee arthroscopy     ORTHOPEDIC SURGERY  2003    bilateral knee replacement     ORTHOPEDIC SURGERY      right knee arthroscopy     PHACOEMULSIFICATION WITH STANDARD INTRAOCULAR LENS IMPLANT  2/11/2014    Procedure: PHACOEMULSIFICATION WITH STANDARD INTRAOCULAR LENS IMPLANT;  CATARACT EXTRACTION WITH INTRA OCULAR LENS RIGHT;  Surgeon: Luis James MD;  Location: [facility] OR     PHACOEMULSIFICATION WITH STANDARD INTRAOCULAR LENS IMPLANT  3/11/2014    Procedure: PHACOEMULSIFICATION WITH STANDARD INTRAOCULAR LENS IMPLANT;  CATARACT EXTRACTION WITH INTRA OCULAR LENS LEFT  ;  Surgeon: Luis James MD;  Location: [facility] OR     TONSILLECTOMY & ADENOIDECTOMY  1942       Family History:    Family History   Problem Relation Age of Onset     C.A.D. Father      MI     Other - See Comments Father      shell shocked in the war     Diabetes Paternal Grandmother        Social History:  Marital Status:   [5]  Social History   Substance Use Topics     Smoking status: Former Smoker     Years: 10.00     Types: Cigars     Quit date: 9/29/1987     Smokeless tobacco: Never Used     Alcohol use No        Medications:      Acetaminophen (TYLENOL PO)   allopurinol (ZYLOPRIM) 300 MG tablet   Ascorbic Acid (VITAMIN C PO)   aspirin 81 MG tablet   BASAGLAR 100 UNIT/ML injection   Captopril (CAPOTEN PO)   captopril (CAPOTEN) 50 MG tablet   cyanocolbalamin (VITAMIN  B-12) 500 MCG tablet   DOCQLACE 100 MG capsule   fish oil-omega-3 fatty acids 1000 MG capsule   insulin aspart (NOVOLOG FLEXPEN) 100 UNIT/ML injection   ketoconazole (NIZORAL) 2 % cream   metFORMIN (GLUCOPHAGE) 1000 MG tablet  "  miconazole (MICATIN; MICRO GUARD) 2 % powder   Oyster Shell Calcium (OS-MARILIN 500 MG [place]. [place]) 500 MG tablet   polyethylene glycol (MIRALAX/GLYCOLAX) powder   sertraline (ZOLOFT) 100 MG tablet   Silver-Carboxymethylcellulose (AQUACEL-AG EXTRA HYDROFIBER) 4\"X5\" PADS   simvastatin (ZOCOR) 10 MG tablet   tamsulosin (FLOMAX) 0.4 MG capsule   terazosin (HYTRIN) 10 MG capsule   traMADol (ULTRAM) 50 MG tablet   vitamin E (TOCOPHEROL) 100 UNIT capsule   blood glucose monitoring (KEO CONTOUR MONITOR) meter device kit   blood glucose monitoring (KEO CONTOUR) test strip   blood glucose monitoring (KEO MICROLET) lancets   Cholecalciferol (VITAMIN D HIGH POTENCY) 1000 UNITS CAPS   Elastic Bandages & Supports (T.E.D. BELOW KNEE/L-REGULAR) MISC   insulin pen needle (NOVOFINE) 30G X 8 MM   levETIRAcetam 1000 MG TABS   order for DME   order for DME   order for DME   order for DME   sulfamethoxazole-trimethoprim (BACTRIM DS/SEPTRA DS) 800-160 MG per tablet   UNABLE TO FIND         Review of Systems   Constitutional: Positive for activity change and appetite change. Negative for diaphoresis, fatigue and fever.   HENT: Negative.    Respiratory: Negative for shortness of breath.    Cardiovascular: Negative for chest pain.   Gastrointestinal: Positive for abdominal pain, constipation and rectal pain. Negative for diarrhea and vomiting.   Genitourinary: Negative for dysuria, frequency and urgency.   Musculoskeletal: Negative.    Skin: Positive for pallor.   Neurological: Negative.    Psychiatric/Behavioral: Negative.        Physical Exam   BP: (!) 149/110  Heart Rate: 97  Resp: 16  SpO2: 95 %      Physical Exam   Constitutional: He is oriented to person, place, and time. He appears well-developed and well-nourished. No distress.   Morbidly obese   HENT:   Head: Normocephalic and atraumatic.   Neck: Normal range of motion. Neck supple.   Cardiovascular: Normal rate, regular rhythm, normal heart sounds and intact distal pulses.    No " murmur heard.  Pulmonary/Chest: Effort normal and breath sounds normal. No respiratory distress.   Abdominal: Soft. He exhibits distension. There is tenderness. There is guarding. There is no rebound.   Localized periumbilical distension, tenderness at this site as well.   Musculoskeletal: Normal range of motion. He exhibits no edema.   Neurological: He is alert and oriented to person, place, and time.   Skin: Skin is warm and dry.   Psychiatric: He has a normal mood and affect.   Nursing note and vitals reviewed.      ED Course     ED Course     Procedures    Results for orders placed or performed during the hospital encounter of 07/07/18 (from the past 24 hour(s))   CBC with platelets differential   Result Value Ref Range    WBC 8.5 4.0 - 11.0 10e9/L    RBC Count 4.09 (L) 4.4 - 5.9 10e12/L    Hemoglobin 13.3 13.3 - 17.7 g/dL    Hematocrit 42.5 40.0 - 53.0 %     (H) 78 - 100 fl    MCH 32.5 26.5 - 33.0 pg    MCHC 31.3 (L) 31.5 - 36.5 g/dL    RDW 13.5 10.0 - 15.0 %    Platelet Count 129 (L) 150 - 450 10e9/L   Comprehensive metabolic panel   Result Value Ref Range    Sodium 142 133 - 144 mmol/L    Potassium 4.8 3.4 - 5.3 mmol/L    Chloride 109 94 - 109 mmol/L    Carbon Dioxide 22 20 - 32 mmol/L    Anion Gap 11 3 - 14 mmol/L    Glucose 166 (H) 70 - 99 mg/dL    Urea Nitrogen 34 (H) 7 - 30 mg/dL    Creatinine 1.52 (H) 0.66 - 1.25 mg/dL    GFR Estimate 44 (L) >60 mL/min/1.7m2    GFR Estimate If Black 53 (L) >60 mL/min/1.7m2    Calcium 9.0 8.5 - 10.1 mg/dL    Bilirubin Total 0.3 0.2 - 1.3 mg/dL    Albumin 3.2 (L) 3.4 - 5.0 g/dL    Protein Total 6.7 (L) 6.8 - 8.8 g/dL    Alkaline Phosphatase 47 40 - 150 U/L    ALT 13 0 - 70 U/L    AST 11 0 - 45 U/L   Lipase   Result Value Ref Range    Lipase 120 73 - 393 U/L       Medications   0.9% sodium chloride BOLUS (0 mLs Intravenous Stopped 7/7/18 1402)     Followed by   sodium chloride 0.9% infusion (1,000 mLs Intravenous New Bag 7/7/18 1442)   iopamidol (ISOVUE-300) IV  solution 61% 100 mL (100 mLs Intravenous Given 7/7/18 1421)   sodium chloride (PF) 0.9% PF flush 60 mL (60 mLs Intravenous Given 7/7/18 1422)   morphine (PF) injection 2 mg (2 mg Intravenous Given 7/7/18 1319)   ondansetron (ZOFRAN) injection 4 mg (4 mg Intravenous Given 7/7/18 1322)       Assessments & Plan (with Medical Decision Making)   Attempted reduction of the umbilical hernia was unsuccessful, it is partially reducible and then comes right back out.  Total diameter of the hernia is about 10 cm in diameter.  CT shows bowel in the defect, CT is difficult to interpret due to the patient's size.  [facility] reported mesenteric and small bowel inflammation and fluid in hernia sac suspicious for ischemia, Dr. Rodriguez was able to reduce the hernia and states the patient can go home.  Patient instructed to reduce the hernia at home if needed.  He is a poor surgical candidate due to his size, surgery would be likely to fail according to surgeon.  Patient feels he can go home at this time.    I have reviewed the nursing notes.    I have reviewed the findings, diagnosis, plan and need for follow up with the patient.  New Prescriptions    No medications on file       Final diagnoses:   Umbilical hernia without obstruction and without gangrene       7/7/2018   [facility]     Vida Squires MD  07/07/18 [id number]

## 2018-08-07 NOTE — TELEPHONE ENCOUNTER
Tramadol  Last Written Prescription Date:  6/27/18  Last Fill Qty:  90, # Refills:  0  Last Office Visit:  5/7/18    PCP is Dr. Desai.  Medication is pended.  Thank you.    Chronic pain syndrome (Chronic)         Problem Detail      Noted:  12/19/2017      Priority:  Medium      Overview Signed 12/19/2017  8:08 AM by Annie Garnett RN     Patient is followed by Moreno Desai MD for ongoing prescription of pain medication.  All refills should only be approved by this provider, or covering partner.     Medication(s): Ultram 50mg.   Maximum quantity per month: #90  Clinic visit frequency required: Q 3 months      Controlled substance agreement:  Encounter-Level CSA - 10/18/2017:            Controlled Substance Agreement - Scan on 10/19/2017 11:09 AM : CONTROLLED SUBSTANCE AGREEMENT (below)            Pain Clinic evaluation in the past: No     DIRE Total Score(s):  No flowsheet data found.     Last [facility] website verification:  done on 12.19.17   [email or web address]

## 2018-08-23 NOTE — PROGRESS NOTES
[address]  [facility] [address]  [phone]  Dept: [phone]    PRE-OP EVALUATION:  Today's date: 2018    Trey Washington (: 1935) presents for pre-operative evaluation assessment as requested by Dr. Monzon.  He requires evaluation and anesthesia risk assessment prior to undergoing surgery/procedure for treatment of right eyelid .    Proposed Surgery/ Procedure: right eyelid surgery   Date of Surgery/ Procedure: 18  Time of Surgery/ Procedure: [facility]/Surgical Facility: [facility]  Primary Physician: Moreno Desai  Type of Anesthesia Anticipated: to be determined    Patient has a Health Care Directive or Living Will:  YES has and will bring     1. NO - Do you have a history of heart attack, stroke, stent, bypass or surgery on an artery in the head, neck, heart or legs?  2. NO - Do you ever have any pain or discomfort in your chest?  3. NO - Do you have a history of  Heart Failure?  4. YES - ARE YOUR TROUBLED BY SHORTNESS OF BREATH WHEN WALKING ON THE LEVEL, UP A SLIGHT HILL OR AT NIGHT? On continuous oxygen   5. NO - Do you currently have a cold, bronchitis or other respiratory infection?  6. NO - Do you have a cough, shortness of breath or wheezing?  7. NO - Do you sometimes get pains in the calves of your legs when you walk?  8. NO - Do you or anyone in your family have previous history of blood clots?  9. NO - Do you or does anyone in your family have a serious bleeding problem such as prolonged bleeding following surgeries or cuts?  10. NO - Have you ever had problems with anemia or been told to take iron pills?  11. YES - HAVE YOU HAD ANY ABNORMAL BLOOD LOSS SUCH AS BLACK, TARRY OR BLOODY STOOLS, OR ABNORMAL VAGINAL BLEEDING? About 1 year ago while hospitalized- lining in colon  12. NO - Have you ever had a blood transfusion?  13. NO - Have you or any of your relatives ever had problems with anesthesia?  14. YES - DO YOU HAVE SLEEP APNEA,  EXCESSIVE SNORING OR DAYTIME DROWSINESS? Sleep apnea- uses c-pap machine   15. NO - Do you have any prosthetic heart valves?  16. YES - DO YOU HAVE PROSTHETIC JOINTS? Bilateral knees  17. NO - Is there any chance that you may be pregnant?      HPI:     HPI related to upcoming procedure: eyelid very bothersome.  Feels great.       See problem list for active medical problems.  Problems all longstanding and stable, except as noted/documented.  See ROS for pertinent symptoms related to these conditions.                                                                                                                                                          .    MEDICAL HISTORY:     Patient Active Problem List    Diagnosis Date Noted     Physical deconditioning 12/21/2017     Priority: Medium     Chronic pain syndrome 12/19/2017     Priority: Medium     Patient is followed by Moreno Desai MD for ongoing prescription of pain medication.  All refills should only be approved by this provider, or covering partner.    Medication(s): Ultram 50mg.   Maximum quantity per month: #90  Clinic visit frequency required: Q 3 months     Controlled substance agreement:  Encounter-Level CSA - 10/18/2017:          Controlled Substance Agreement - Scan on 10/19/2017 11:09 AM : CONTROLLED SUBSTANCE AGREEMENT (below)              Pain Clinic evaluation in the past: No    DIRE Total Score(s):  No flowsheet data found.    Last [facility] website verification:  done on 12.19.17   [email or web address]         Osteoarthrosis involving lower leg 08/04/2017     Priority: Medium     Overview:   status post total knee arthroplasties       Right hip pain 07/29/2017     Priority: Medium     Convulsive syncope 03/12/2017     Priority: Medium     Essential hypertension with goal blood pressure less than 140/90 10/11/2016     Priority: Medium     Advanced directives, counseling/discussion 10/05/2016     Priority: Medium     Advance Care Planning  10/5/2016: ACP Review of Chart / Resources Provided:  Reviewed chart for advance care plan.  Trey Washington has no plan or code status on file. Discussed available resources and provided with information. Pt has paperwork at home but has not completed it yet.  Added by Josefa Amor             Controlled type 2 diabetes mellitus without complication, with long-term current use of insulin (H) 10/05/2016     Priority: Medium     Type 2 diabetes mellitus with diabetic nephropathy, with long-term current use of insulin (H) 10/05/2016     Priority: Medium     Skin ulcer (H)      Priority: Medium     ACP (advance care planning) 04/18/2016     Priority: Medium     Advance Care Planning 4/18/2016: ACP Review of Chart / Resources Provided:  Reviewed chart for advance care plan.  Trey Washington has been provided information and resources to begin or update their advance care plan.  Added by Esperanza Reynoso             Pure hypercholesterolemia      Priority: Medium     Chronic kidney disease, stage III (moderate)      Priority: Medium     Morbid (severe) obesity due to excess calories (H)      Priority: Medium     Problem list name updated by automated process. Provider to review       Obstructive sleep apnea      Priority: Medium     Problem list name updated by automated process. Provider to review       Vitamin B12 deficiency 08/22/2011     Priority: Medium      Past Medical History:   Diagnosis Date     Chronic kidney disease, stage III (moderate)      Obesity, unspecified      Obstructive sleep apnea (adult) (pediatric)      Osteoarthrosis, unspecified whether generalized or localized, lower leg      Other abnormal blood chemistry     hyperuricemia     Other B-complex deficiencies      Other choreas     Hemiballism     Pure hypercholesterolemia      Skin ulcer (H)      Type II or unspecified type diabetes mellitus without mention of complication, not stated as uncontrolled      Unspecified essential  hypertension      Past Surgical History:   Procedure Laterality Date     COLONOSCOPY       ENT SURGERY      lanced abcess in left ear     GENITOURINARY SURGERY      removal of kidney stones     ORTHOPEDIC SURGERY      knee arthroscopy     ORTHOPEDIC SURGERY  2003    bilateral knee replacement     ORTHOPEDIC SURGERY      right knee arthroscopy     PHACOEMULSIFICATION WITH STANDARD INTRAOCULAR LENS IMPLANT  2/11/2014    Procedure: PHACOEMULSIFICATION WITH STANDARD INTRAOCULAR LENS IMPLANT;  CATARACT EXTRACTION WITH INTRA OCULAR LENS RIGHT;  Surgeon: Luis James MD;  Location: [facility] OR     PHACOEMULSIFICATION WITH STANDARD INTRAOCULAR LENS IMPLANT  3/11/2014    Procedure: PHACOEMULSIFICATION WITH STANDARD INTRAOCULAR LENS IMPLANT;  CATARACT EXTRACTION WITH INTRA OCULAR LENS LEFT  ;  Surgeon: Luis James MD;  Location: [facility] OR     TONSILLECTOMY & ADENOIDECTOMY  1942     Current Outpatient Prescriptions   Medication Sig Dispense Refill     Acetaminophen (TYLENOL PO) Take 1,000 mg by mouth At Bedtime        allopurinol (ZYLOPRIM) 300 MG tablet Take 1 tablet (300 mg) by mouth daily 90 tablet 0     Ascorbic Acid (VITAMIN C PO) Take 500 mg by mouth daily        aspirin 81 MG tablet Take 1 tablet by mouth every other day        BASAGLAR 100 UNIT/ML injection 18 units daily subq 15 mL 3     blood glucose monitoring (KEO CONTOUR MONITOR) meter device kit Use to test blood sugars 2 times daily or as directed. 1 kit 0     blood glucose monitoring (KEO CONTOUR) test strip Use to test blood sugars 3 times daily or as directed. 300 each 3     blood glucose monitoring (KEO MICROLET) lancets USE AS DIRECTED TESTING BLOOD SUGAR THREE TIMES DAILY OR AS DIRECTED 300 each 1     Captopril (CAPOTEN PO) Take 50 mg by mouth 3 times daily       captopril (CAPOTEN) 50 MG tablet TAKE 2 TABLETS (100MG) BY MOUTH THREE TIMES DAILY 270 tablet 2     Cholecalciferol (VITAMIN D HIGH POTENCY) 1000 units CAPS TAKE 1 CAPSULE BY MOUTH DAILY 90  "capsule 2     cyanocolbalamin (VITAMIN  B-12) 500 MCG tablet TAKE 2 TABLETS BY MOUTH EVERY  tablet 1     DOCQLACE 100 MG capsule TAKE 1 CAPSULE BY MOUTH TWICE DAILY 100 capsule 3     Elastic Bandages & Supports (T.E.D. BELOW KNEE/L-REGULAR) MISC Apply in am and take off in the evening 6 each 11     fish oil-omega-3 fatty acids 1000 MG capsule Take 1,000 mg by mouth daily       insulin aspart (NOVOLOG FLEXPEN) 100 UNIT/ML injection INJECT 8 UNITS SUBCUTANEOUS with lunch 9 mL 0     insulin pen needle (NOVOFINE) 30G X 8 MM USE 4 PENS NEEDLES DAILY OR AS DIRECTED 100 each 6     ketoconazole (NIZORAL) 2 % cream Apply topically 2 times daily as needed       levETIRAcetam 1000 MG TABS Take 1 tablet by mouth 2 times daily 60 tablet 1     metFORMIN (GLUCOPHAGE) 1000 MG tablet TAKE 1 TABLET BY MOUTH ONCE DAILY WITH A MEAL 90 tablet 1     miconazole (MICATIN; MICRO GUARD) 2 % powder Apply topically 2 times daily as needed       order for DME 4x4 bulk SOFT gauze (disp 1 sleeve, refill x 5) 1 Units 0     order for DME Equipment being ordered: Meplilex Transfer 6 X 8 inch (469685) - disp 3;  Aquacel AG 4 X 5 ([id number]); disp 3 - Refills 6 1 each 6     order for DME Equipment being ordered: Oxygen 2 LPM per nasal cannula during the day, portable also 1 each 11     order for DME Equipment being ordered: Wheelchair 1 each 0     oyster shell calcium (OS-MARILIN 500 MG Platinum. CA) 500 MG tablet Take 1 tablet (500 mg) by mouth 2 times daily 180 tablet 2     polyethylene glycol (MIRALAX/GLYCOLAX) powder TAKE 17 GRAMS BY MOUTH DAILY MIXED AS DIRECTED. 527 g 3     sertraline (ZOLOFT) 100 MG tablet TAKE 1 TABLET BY MOUTH ONCE DAILY 90 tablet 1     Silver-Carboxymethylcellulose (AQUACEL-AG EXTRA HYDROFIBER) 4\"X5\" PADS Externally apply 0.25 each topically every other day Use as directed by wound center 5 each 3     simvastatin (ZOCOR) 10 MG tablet TAKE 1 TABLET BY MOUTH NIGHTLY AT BEDTIME 90 tablet 2     sulfamethoxazole-trimethoprim (BACTRIM " DS/SEPTRA DS) 800-160 MG per tablet Take 1 tablet by mouth 2 times daily 60 tablet 0     sulfamethoxazole-trimethoprim (BACTRIM DS/SEPTRA DS) 800-160 MG per tablet Take 1 tablet by mouth 2 times daily 28 tablet 0     tamsulosin (FLOMAX) 0.4 MG capsule TAKE 2 CAPSULES BY MOUTH DAILY 90 capsule 3     terazosin (HYTRIN) 10 MG capsule TAKE 1 CAPSULE BY MOUTH NIGHTLY AT BEDTIME 90 capsule 2     traMADol (ULTRAM) 50 MG tablet TAKE ONE TABLET BY MOUTH EVERY 8 HOURS AS NEEDED MODERATE PAIN 90 tablet 0     UNABLE TO FIND CPAP       UNIFINE PENTIPS 31G X 8 MM USE 4 PEN NEEDLES DAILY OR AS DIRECTED 200 each 1     vitamin E (TOCOPHEROL) 100 UNIT capsule Take 100 Units by mouth daily Uncertain of dose       OTC products: None, except as noted above    Allergies   Allergen Reactions     Hydrocodone      Intolerance       Penicillins Swelling      Latex Allergy: NO    Social History   Substance Use Topics     Smoking status: Former Smoker     Years: 10.00     Types: Cigars     Quit date: 9/29/1987     Smokeless tobacco: Never Used     Alcohol use No     History   Drug Use No       REVIEW OF SYSTEMS:   CONSTITUTIONAL: NEGATIVE for fever, chills, change in weight  INTEGUMENTARY/SKIN: NEGATIVE for worrisome rashes, moles or lesions  EYES: NEGATIVE for vision changes or irritation  ENT/MOUTH: NEGATIVE for ear, mouth and throat problems  RESP: NEGATIVE for significant cough or SOB  BREAST: NEGATIVE for masses, tenderness or discharge  CV: NEGATIVE for chest pain, palpitations or peripheral edema  GI: NEGATIVE for nausea, abdominal pain, heartburn, or change in bowel habits  : NEGATIVE for frequency, dysuria, or hematuria  MUSCULOSKELETAL: NEGATIVE for significant arthralgias or myalgia  NEURO: NEGATIVE for weakness, dizziness or paresthesias  ENDOCRINE: NEGATIVE for temperature intolerance, skin/hair changes  HEME: NEGATIVE for bleeding problems  PSYCHIATRIC: NEGATIVE for changes in mood or affect    EXAM:   There were no vitals  taken for this visit.    GENERAL APPEARANCE: healthy, alert and no distress     EYES: EOMI,  PERRL     HENT: ear canals and TM's normal and nose and mouth without ulcers or lesions     NECK: no adenopathy, no asymmetry, masses, or scars and thyroid normal to palpation     RESP: lungs clear to auscultation - no rales, rhonchi or wheezes     CV: regular rates and rhythm, normal S1 S2, no S3 or S4 and no murmur, click or rub     ABDOMEN:  soft, nontender, no HSM or masses and bowel sounds normal.  Large umbilical hernia.      MS: extremities normal- no gross deformities noted, no evidence of inflammation in joints, FROM in all extremities.     SKIN: no suspicious lesions or rashes     NEURO: Normal strength and tone, sensory exam grossly normal, mentation intact and speech normal     PSYCH: mentation appears normal. and affect normal/bright     LYMPHATICS: No cervical adenopathy    DIAGNOSTICS:   EKG: Normal Sinus Rhythm with unchanged 1st degree av block, normal axis,  no acute ST/T changes c/w ischemia, no LVH by voltage criteria, occasional PVC noted, unifocal, unchanged from previous tracings.  Cbc normal.  Bmp and a1c pending and will be available.     Recent Labs   Lab Test  07/07/18   1313  05/07/18   1010   12/21/17   1624  12/21/17   1623   03/04/17   1104   HGB  13.3   --    --   12.9*   --    < >  11.0*   PLT  129*   --    --   160   --    < >  145*   INR   --    --    --    --    --    --   1.12   NA  142  143   < >  142   --    < >  143   POTASSIUM  4.8  4.0   < >  4.4   --    < >  4.2   CR  1.52*  1.69*   < >  1.36*   --    < >  1.72*   A1C   --   5.5   --    --   6.0   < >   --     < > = values in this interval not displayed.        IMPRESSION:   Reason for surgery/procedure: eyelid issue.   Diagnosis/reason for consult: surgery to correct.     The proposed surgical procedure is considered LOW risk.    REVISED CARDIAC RISK INDEX  The patient has the following serious cardiovascular risks for  perioperative complications such as (MI, PE, VFib and 3  AV Block):  No serious cardiac risks  INTERPRETATION: 0 risks: Class I (very low risk - 0.4% complication rate)    The patient has the following additional risks for perioperative complications:  No identified additional risks      ICD-10-CM    1. Preop general physical exam Z01.818        RECOMMENDATIONS:     --Consult hospital rounder / IM to assist post-op medical management    --Patient is to take all scheduled medications on the day of surgery EXCEPT for modifications listed below.    APPROVAL GIVEN to proceed with proposed procedure, without further diagnostic evaluation       Signed Electronically by: Moreno Desai MD    Copy of this evaluation report is provided to requesting physician.    [place] Preop Guidelines    Revised Cardiac Risk Index

## 2018-08-27 NOTE — MR AVS SNAPSHOT
After Visit Summary   8/27/2018    Trey Washington    MRN: [id number]           Patient Information     Date Of Birth          1935        Visit Information        Provider Department      8/27/2018 11:00 AM Moreno Desai MD [facility]        Today's Diagnoses     Preop general physical exam    -  1    Type 2 diabetes mellitus with diabetic nephropathy, with long-term current use of insulin (H)        Obstructive sleep apnea        Right hip pain        Umbilical hernia without obstruction and without gangrene        Open wound of abdomen, subsequent encounter          Care Instructions      Before Your Surgery      Call your surgeon if there is any change in your health. This includes signs of a cold or flu (such as a sore throat, runny nose, cough, rash or fever).    Do not smoke, drink alcohol or take over the counter medicine (unless your surgeon or primary care doctor tells you to) for the 24 hours before and after surgery.    If you take prescribed drugs: Follow your doctor s orders about which medicines to take and which to stop until after surgery.    Eating and drinking prior to surgery: follow the instructions from your surgeon    Take a shower or bath the night before surgery. Use the soap your surgeon gave you to gently clean your skin. If you do not have soap from your surgeon, use your regular soap. Do not shave or scrub the surgery site.  Wear clean pajamas and have clean sheets on your bed.           Follow-ups after your visit        Who to contact     If you have questions or need follow up information about today's clinic visit or your schedule please contact [facility] directly at [phone].  Normal or non-critical lab and imaging results will be communicated to you by MyChart, letter or phone within 4 business days after the clinic has received the results. If you do not hear from us within 7 days, please contact the clinic through  [facility] or phone. If you have a critical or abnormal lab result, we will notify you by phone as soon as possible.  Submit refill requests through [facility] or call your pharmacy and they will forward the refill request to us. Please allow 3 business days for your refill to be completed.          Additional Information About Your Visit        Hosted Systemshart Information     [facility] gives you secure access to your electronic health record. If you see a primary care provider, you can also send messages to your care team and make appointments. If you have questions, please call your primary care clinic.  If you do not have a primary care provider, please call [phone] and they will assist you.        Care EveryWhere ID     This is your Care EveryWhere ID. This could be used by other organizations to access your [facility] medical records  [id number]        Your Vitals Were     Pulse Temperature Pulse Oximetry BMI (Body Mass Index)          93 97.6  F (36.4  C) (Tympanic) 91% 51.65 kg/m2         Blood Pressure from Last 3 Encounters:   08/27/18 124/78   07/07/18 174/74   05/07/18 134/88    Weight from Last 3 Encounters:   08/27/18 (!) 360 lb (163.3 kg)   12/22/17 (!) 361 lb 12.4 oz (164.1 kg)   10/18/17 (!) 355 lb (161 kg)              We Performed the Following     Basic metabolic panel     CBC with platelets and differential     EKG 12-lead complete w/read - (Clinic Performed)     Hemoglobin A1c          Today's Medication Changes          These changes are accurate as of 8/27/18 12:49 PM.  If you have any questions, ask your nurse or doctor.               Start taking these medicines.        Dose/Directions    order for DME   Used for:  Obstructive sleep apnea   Started by:  Moreno Desai MD        Equipment being ordered: Oxygen concentrator and supplies and portable tank for travel.   Quantity:  1 each   Refills:  1       order for DME   Used for:  Right hip pain   Started by:  Moreno Desai MD        Equipment  "being ordered: wheeled walker   Quantity:  1 each   Refills:  1         These medicines have changed or have updated prescriptions.        Dose/Directions    sulfamethoxazole-trimethoprim 800-160 MG per tablet   Commonly known as:  BACTRIM DS/SEPTRA DS   This may have changed:  Another medication with the same name was removed. Continue taking this medication, and follow the directions you see here.   Used for:  Prostatitis, unspecified prostatitis type   Changed by:  Moreno Desai MD        Dose:  1 tablet   Take 1 tablet by mouth 2 times daily   Quantity:  60 tablet   Refills:  0            Where to get your medicines      Some of these will need a paper prescription and others can be bought over the counter.  Ask your nurse if you have questions.     Bring a paper prescription for each of these medications     AQUACEL-AG EXTRA HYDROFIBER 4\"X5\" Pads    order for DME    order for DME                Primary Care Provider Office Phone # Fax #    Moreno Desai [phone]       [address] [place] [id number]        Equal Access to Services     [facility] Center[facility] AH: Hadii sofy ku hadasho Soomaali, waaxda luqadaha, qaybta kaalmada adeegyada, waxay idiin hayaan leo mancuso . So [facility] [phone].    ATENCIÓN: Si habla español, tiene a cabrera disposición servicios gratuitos de asistencia lingüística. Llame al [phone].    We comply with applicable federal civil rights laws and [place] laws. We do not discriminate on the basis of race, color, national origin, age, disability, sex, sexual orientation, or gender identity.            Thank you!     Thank you for choosing [facility]  for your care. Our goal is always to provide you with excellent care. Hearing back from our patients is one way we can continue to improve our services. Please take a few minutes to complete the written survey that you may receive in the mail after your visit with us. Thank you!             Your " "Updated Medication List - Protect others around you: Learn how to safely use, store and throw away your medicines at www.disposemymeds.org.          This list is accurate as of 8/27/18 12:49 PM.  Always use your most recent med list.                   Brand Name Dispense Instructions for use Diagnosis    allopurinol 300 MG tablet    ZYLOPRIM    90 tablet    Take 1 tablet (300 mg) by mouth daily    Idiopathic gout, unspecified chronicity, unspecified site       AQUACEL-AG EXTRA HYDROFIBER 4\"X5\" Pads     5 each    Externally apply 0.25 each topically every other day Use as directed by wound center    Open wound of abdomen, subsequent encounter       aspirin 81 MG tablet      Take 1 tablet by mouth every other day        BASAGLAR 100 UNIT/ML injection     15 mL    18 units daily subq    IDDM (insulin dependent diabetes mellitus) (H)       blood glucose monitoring lancets     300 each    USE AS DIRECTED TESTING BLOOD SUGAR THREE TIMES DAILY OR AS DIRECTED    IDDM (insulin dependent diabetes mellitus) (H)       blood glucose monitoring meter device kit     1 kit    Use to test blood sugars 2 times daily or as directed.    IDDM (insulin dependent diabetes mellitus) (H)       blood glucose monitoring test strip    KEO CONTOUR    300 each    Use to test blood sugars 3 times daily or as directed.    IDDM (insulin dependent diabetes mellitus) (H)       calcium carbonate 500 mg {elemental} 500 MG tablet    OSCAL;OYSTER SHELL CALCIUM    180 tablet    Take 1 tablet (500 mg) by mouth 2 times daily    HTN (hypertension)       * CAPOTEN PO      Take 50 mg by mouth 3 times daily        * captopril 50 MG tablet    CAPOTEN    270 tablet    TAKE 2 TABLETS (100MG) BY MOUTH THREE TIMES DAILY    Essential hypertension       cyanocolbalamin 500 MCG tablet    vitamin  B-12    180 tablet    TAKE 2 TABLETS BY MOUTH EVERY DAY    Vitamin deficiency       DOCQLACE 100 MG capsule   Generic drug:  docusate sodium     100 capsule    TAKE 1 " CAPSULE BY MOUTH TWICE DAILY    Constipation       fish oil-omega-3 fatty acids 1000 MG capsule      Take 1,000 mg by mouth daily        insulin aspart 100 UNIT/ML injection    NovoLOG FLEXPEN    9 mL    INJECT 8 UNITS SUBCUTANEOUS with lunch    Type 2 diabetes mellitus with diabetic nephropathy, with long-term current use of insulin (H), Physical deconditioning       * insulin pen needle 30G X 8 MM    NOVOFINE    100 each    USE 4 PENS NEEDLES DAILY OR AS DIRECTED    IDDM (insulin dependent diabetes mellitus) (H)       * UNIFINE PENTIPS 31G X 8 MM   Generic drug:  insulin pen needle     200 each    USE 4 PEN NEEDLES DAILY OR AS DIRECTED    IDDM (insulin dependent diabetes mellitus) (H)       ketoconazole 2 % cream    NIZORAL     Apply topically 2 times daily as needed        levETIRAcetam 1000 MG Tabs     60 tablet    Take 1 tablet by mouth 2 times daily    Seizure disorder (H)       metFORMIN 1000 MG tablet    GLUCOPHAGE    90 tablet    TAKE 1 TABLET BY MOUTH ONCE DAILY WITH A MEAL    IDDM (insulin dependent diabetes mellitus) (H)       miconazole 2 % powder    MICATIN; MICRO GUARD     Apply topically 2 times daily as needed        * order for DME     1 each    Equipment being ordered: Oxygen 2 LPM per nasal cannula during the day, portable also    Hypoxia       * order for DME     1 each    Equipment being ordered: Wheelchair    Morbid obesity, unspecified obesity type (H)       * order for DME     1 each    Equipment being ordered: Meplilex Transfer 6 X 8 inch ([id number]) - disp 3;  Aquacel AG 4 X 5 ([id number]); disp 3 - Refills 6    Skin ulcer, limited to breakdown of skin (H)       order for DME     1 Units    4x4 bulk SOFT gauze (disp 1 sleeve, refill x 5)    Ulcer of skin (H)       order for DME     1 each    Equipment being ordered: Oxygen concentrator and supplies and portable tank for travel.    Obstructive sleep apnea       order for DME     1 each    Equipment being ordered: wheeled walker    Right hip pain        polyethylene glycol powder    MIRALAX/GLYCOLAX    527 g    TAKE 17 GRAMS BY MOUTH DAILY MIXED AS DIRECTED.    Constipation       sertraline 100 MG tablet    ZOLOFT    90 tablet    TAKE 1 TABLET BY MOUTH ONCE DAILY    Dysthymia       simvastatin 10 MG tablet    ZOCOR    90 tablet    TAKE 1 TABLET BY MOUTH NIGHTLY AT BEDTIME    Hypercholesterolemia       sulfamethoxazole-trimethoprim 800-160 MG per tablet    BACTRIM DS/SEPTRA DS    60 tablet    Take 1 tablet by mouth 2 times daily    Prostatitis, unspecified prostatitis type       T.E.D. BELOW KNEE/L-REGULAR Misc     6 each    Apply in am and take off in the evening    Swelling of limb       tamsulosin 0.4 MG capsule    FLOMAX    90 capsule    TAKE 2 CAPSULES BY MOUTH DAILY    Benign prostatic hyperplasia, unspecified whether lower urinary tract symptoms present       terazosin 10 MG capsule    HYTRIN    90 capsule    TAKE 1 CAPSULE BY MOUTH NIGHTLY AT BEDTIME    Essential hypertension       traMADol 50 MG tablet    ULTRAM    90 tablet    TAKE ONE TABLET BY MOUTH EVERY 8 HOURS AS NEEDED MODERATE PAIN    Type 2 diabetes mellitus with diabetic nephropathy, with long-term current use of insulin (H), Chronic pain syndrome, Physical deconditioning, Osteoarthrosis involving lower leg       TYLENOL PO      Take 1,000 mg by mouth At Bedtime        UNABLE TO FIND      CPAP        VITAMIN C PO      Take 500 mg by mouth daily        VITAMIN D HIGH POTENCY 1000 units Caps     90 capsule    TAKE 1 CAPSULE BY MOUTH DAILY    Vitamin D deficiency       vitamin E 100 UNIT capsule    TOCOPHEROL     Take 100 Units by mouth daily Uncertain of dose        * Notice:  This list has 7 medication(s) that are the same as other medications prescribed for you. Read the directions carefully, and ask your doctor or other care provider to review them with you.

## 2018-08-29 NOTE — TELEPHONE ENCOUNTER
Rasheeda from [employer] calls the pt's previous order for O2 at HS through "[employer] Drug did not qualify for services. Can we get an order for a night time oximeter?

## 2018-09-05 NOTE — TELEPHONE ENCOUNTER
Novolog      Last Written Prescription Date:  12/22/2017  Last Fill Quantity: 9mL,   # refills: 0  Last Office Visit: 8/27/2018  Future Office visit:    Next 5 appointments (look out 90 days)     Oct 08, 2018  8:15 AM CDT   (Arrive by 8:00 AM)   SHORT with Moreno Desai MD   [facility] ([facility] )    [address] [facility] [address]   [phone]

## 2018-09-10 NOTE — TELEPHONE ENCOUNTER
Adri calls the pt is having surgery tomorrow at 1 pm. Pt has been having low BG's on the Basaglar and you have approved that the dose be lowered recently. Metformin will be held in the am, but she is concerned about the dose tonight. His BG in the am's has been in the low 80's and surgery is not until 1 pm. Please advise if any insulin should be given tonight?

## 2018-09-11 NOTE — TELEPHONE ENCOUNTER
I called Adri she has not been contacted for overnight oximetry yet. I called the sleep center and left a message to call back.

## 2018-09-12 NOTE — TELEPHONE ENCOUNTER
I called the sleep lab the pt may be able to do this tomorrow night. I advised he contact Adri his caregiver to arrange this. Pt has signed LASHAUN to discuss PHI with her.

## 2018-09-17 NOTE — TELEPHONE ENCOUNTER
I called the sleep lab the pt was sent home on Friday with the meter, but he had him do the test with his O2 on as his previous test his O2 dropped into the 50's and he did not feel this was safe to do.

## 2018-09-27 NOTE — TELEPHONE ENCOUNTER
I think we should refer to urology.  I'm not exactly sure what best course of action is.  Offer that please and thanks. Moreno Desai

## 2018-09-27 NOTE — TELEPHONE ENCOUNTER
Adri calls the pt's urinary problems have returned. He was last on one month of Bactrim. Sx resolved while on the ABX. Last Sulfa Rx was 08/09/18. Please advise.

## 2018-10-01 NOTE — TELEPHONE ENCOUNTER
tramadol      Last Written Prescription Date:  8/7/18  Last Fill Quantity: 90,   # refills: 0  Last Office Visit: 8/27/18  Future Office visit:    Next 5 appointments (look out 90 days)     Oct 08, 2018  8:15 AM CDT   (Arrive by 8:00 AM)   SHORT with Moreno Desai MD   [facility]    [address] [facility] [address]   [phone]                   Routing refill request to provider for review/approval because:  Drug not on the [facility], [facility] or [facility] refill protocol or controlled substance

## 2018-10-05 NOTE — PROGRESS NOTES
SUBJECTIVE:   Trey Washington is a 83 year old male who presents to clinic today for the following health issues:      Diabetes Follow-up    Patient is checking blood sugars: two times daily.   Blood sugar testing frequency justification: Uncontrolled diabetes  Results are as follows:         am -          bedtime - 113    Diabetic concerns: None and other - Insulin units given at bedtime     Symptoms of hypoglycemia (low blood sugar): none     Paresthesias (numbness or burning in feet) or sores: No     Date of last diabetic eye exam: Within the last year    Diabetes Management Resources    Hyperlipidemia Follow-Up      Rate your low fat/cholesterol diet?: fair    Taking statin?  Yes, no muscle aches from statin    Other lipid medications/supplements?:  none    Hypertension Follow-up      Outpatient blood pressures are being checked at home.  Results are WNL.    Low Salt Diet: no added salt    BP Readings from Last 2 Encounters:   08/27/18 124/78   07/07/18 174/74     Hemoglobin A1C (%)   Date Value   08/27/2018 5.6   05/07/2018 5.5     LDL Cholesterol Calculated (mg/dL)   Date Value   05/07/2018 88   04/03/2017 103 (H)       Amount of exercise or physical activity: None    Problems taking medications regularly: No    Medication side effects: none    Diet: regular (no restrictions)        PROBLEMS TO ADD ON...    Problem list and histories reviewed & adjusted, as indicated.  Additional history: has the umbilical hernia and would like operation.  Has the chronic non healing ulcer.  Stable.  Gets the urinary dribbling horribly off the bactrim.  We have tried courses twice and the sx keep coming back.  Wants flu shot.  Dm cares great.  Needing less insulin.  We updated.     Patient Active Problem List   Diagnosis     Pure hypercholesterolemia     Chronic kidney disease, stage III (moderate) (H)     Morbid (severe) obesity due to excess calories (H)     Obstructive sleep apnea     ACP (advance care planning)      Skin ulcer (H)     Advanced directives, counseling/discussion     Controlled type 2 diabetes mellitus without complication, with long-term current use of insulin (H)     Type 2 diabetes mellitus with diabetic nephropathy, with long-term current use of insulin (H)     Essential hypertension with goal blood pressure less than 140/90     Convulsive syncope     Right hip pain     Vitamin B12 deficiency     Osteoarthrosis involving lower leg     Chronic pain syndrome     Physical deconditioning     Past Surgical History:   Procedure Laterality Date     COLONOSCOPY       ENT SURGERY      lanced abcess in left ear     GENITOURINARY SURGERY      removal of kidney stones     ORTHOPEDIC SURGERY      knee arthroscopy     ORTHOPEDIC SURGERY  2003    bilateral knee replacement     ORTHOPEDIC SURGERY      right knee arthroscopy     PHACOEMULSIFICATION WITH STANDARD INTRAOCULAR LENS IMPLANT  2/11/2014    Procedure: PHACOEMULSIFICATION WITH STANDARD INTRAOCULAR LENS IMPLANT;  CATARACT EXTRACTION WITH INTRA OCULAR LENS RIGHT;  Surgeon: Luis James MD;  Location: [facility]     PHACOEMULSIFICATION WITH STANDARD INTRAOCULAR LENS IMPLANT  3/11/2014    Procedure: PHACOEMULSIFICATION WITH STANDARD INTRAOCULAR LENS IMPLANT;  CATARACT EXTRACTION WITH INTRA OCULAR LENS LEFT  ;  Surgeon: Luis James MD;  Location: [facility]     TONSILLECTOMY & ADENOIDECTOMY  1942       Social History   Substance Use Topics     Smoking status: Former Smoker     Years: 10.00     Types: Cigars     Quit date: 9/29/1987     Smokeless tobacco: Never Used     Alcohol use No     Family History   Problem Relation Age of Onset     C.A.D. Father      MI     Other - See Comments Father      shell shocked in the war     Diabetes Paternal Grandmother          Current Outpatient Prescriptions   Medication Sig Dispense Refill     Acetaminophen (TYLENOL PO) Take 1,000 mg by mouth At Bedtime        allopurinol (ZYLOPRIM) 300 MG tablet Take 1 tablet (300 mg) by mouth  daily 90 tablet 0     Ascorbic Acid (VITAMIN C PO) Take 500 mg by mouth daily        aspirin 81 MG tablet Take 1 tablet by mouth every other day        BASAGLAR 100 UNIT/ML injection 12 units daily subq 15 mL 3     blood glucose monitoring (KEO CONTOUR MONITOR) meter device kit Use to test blood sugars 2 times daily or as directed. 1 kit 0     blood glucose monitoring (KEO CONTOUR) test strip Use to test blood sugars 3 times daily or as directed. 300 each 3     blood glucose monitoring (KEO MICROLET) lancets USE AS DIRECTED TESTING BLOOD SUGAR THREE TIMES DAILY OR AS DIRECTED 300 each 1     blood glucose monitoring (NO BRAND SPECIFIED) test strip Use to test blood sugars 3 times daily or as directed- true matrix test strips. 100 strip 5     Captopril (CAPOTEN PO) Take 50 mg by mouth 3 times daily       Cholecalciferol (VITAMIN D HIGH POTENCY) 1000 units CAPS TAKE 1 CAPSULE BY MOUTH DAILY 90 capsule 2     cyanocolbalamin (VITAMIN  B-12) 500 MCG tablet TAKE 2 TABLETS BY MOUTH EVERY  tablet 1     DOCQLACE 100 MG capsule TAKE 1 CAPSULE BY MOUTH TWICE DAILY 100 capsule 3     Elastic Bandages & Supports (T.E.D. BELOW KNEE/L-REGULAR) MISC Apply in am and take off in the evening 6 each 11     fish oil-omega-3 fatty acids 1000 MG capsule Take 1,000 mg by mouth daily       insulin aspart (NOVOLOG FLEXPEN) 100 UNIT/ML injection INJECT 8 UNITS SUBCUTANEOUS with lunch 9 mL 0     insulin pen needle (NOVOFINE) 30G X 8 MM USE 4 PENS NEEDLES DAILY OR AS DIRECTED 100 each 6     ketoconazole (NIZORAL) 2 % cream Apply topically 2 times daily as needed       levETIRAcetam 1000 MG TABS Take 1 tablet by mouth 2 times daily 60 tablet 1     metFORMIN (GLUCOPHAGE) 1000 MG tablet TAKE 1 TABLET BY MOUTH ONCE DAILY WITH A MEAL 90 tablet 1     miconazole (MICATIN; MICRO GUARD) 2 % powder Apply topically 2 times daily as needed       miconazole (MICATIN; MICRO GUARD) 2 % powder        order for DME Equipment being ordered: Oxygen  "concentrator and supplies and portable tank for travel. 1 each 1     order for DME Equipment being ordered: wheeled walker 1 each 1     order for DME 4x4 bulk SOFT gauze (disp 1 sleeve, refill x 5) 1 Units 0     order for DME Equipment being ordered: Meplilex Transfer 6 X 8 inch ([id number]) - disp 3;  Aquacel AG 4 X 5 ([id number]); disp 3 - Refills 6 1 each 6     order for DME Equipment being ordered: Oxygen 2 LPM per nasal cannula during the day, portable also 1 each 11     order for DME Equipment being ordered: Wheelchair 1 each 0     oyster shell calcium (OS-MARILIN 500 MG [place]. [place]) 500 MG tablet Take 1 tablet (500 mg) by mouth 2 times daily 180 tablet 2     polyethylene glycol (MIRALAX/GLYCOLAX) powder TAKE 17 GRAMS BY MOUTH DAILY MIXED AS DIRECTED. 527 g 3     sertraline (ZOLOFT) 100 MG tablet TAKE 1 TABLET BY MOUTH ONCE DAILY 90 tablet 1     Silver-Carboxymethylcellulose (AQUACEL-AG EXTRA HYDROFIBER) 4\"X5\" PADS Externally apply 0.25 each topically every other day Use as directed by wound center 5 each 3     simvastatin (ZOCOR) 10 MG tablet TAKE 1 TABLET BY MOUTH NIGHTLY AT BEDTIME 90 tablet 2     sulfamethoxazole-trimethoprim (BACTRIM DS/SEPTRA DS) 800-160 MG per tablet Take 1 tablet by mouth 2 times daily 60 tablet 0     tamsulosin (FLOMAX) 0.4 MG capsule TAKE 2 CAPSULES BY MOUTH DAILY 90 capsule 3     terazosin (HYTRIN) 10 MG capsule TAKE 1 CAPSULE BY MOUTH NIGHTLY AT BEDTIME 90 capsule 2     traMADol (ULTRAM) 50 MG tablet TAKE ONE TABLET BY MOUTH EVERY 8 HOURS AS NEEDED MODERATE PAIN 90 tablet 0     UNABLE TO FIND CPAP       UNIFINE PENTIPS 31G X 8 MM USE 4 PEN NEEDLES DAILY OR AS DIRECTED 200 each 1     vitamin E (TOCOPHEROL) 100 UNIT capsule Take 100 Units by mouth daily Uncertain of dose       [DISCONTINUED] BASAGLAR 100 UNIT/ML injection 18 units daily subq 15 mL 3     [DISCONTINUED] captopril (CAPOTEN) 50 MG tablet TAKE 2 TABLETS (100MG) BY MOUTH THREE TIMES DAILY 270 tablet 2     Allergies   Allergen Reactions " "    Hydrocodone      Intolerance       Penicillins Swelling       Reviewed and updated as needed this visit by clinical staff       Reviewed and updated as needed this visit by Provider         ROS:  Constitutional, HEENT, cardiovascular, pulmonary, gi and gu systems are negative, except as otherwise noted.    OBJECTIVE:                                                    /72  Pulse 83  Temp 97.6  F (36.4  C) (Tympanic)  Ht 5' 10\" (1.778 m)  Wt (!) 365 lb (165.6 kg)  SpO2 (!) 88%  BMI 52.37 kg/m2  Body mass index is 52.37 kg/(m^2).  GENERAL APPEARANCE: Alert, no acute distress  CV: regular rate and rhythm, no murmur, rub or gallop  RESP: lungs clear to auscultation bilaterally  ABDOMEN: umbilical hernia ongoing.   SKIN: no suspicious lesions or rashes to visualized skin  NEURO: Alert, oriented x 3, speech and mentation normal      UDS pending.      ASSESSMENT/PLAN:                                                    1. Chronic pain syndrome  Due for uds.  I ordered.   - Pain Drug Scr UR W Rptd Meds    2. Obstructive sleep apnea  With hypoxia.  Stable.     3. Controlled type 2 diabetes mellitus without complication, with long-term current use of insulin (H)  Stable   Updated insulin at 12 units daily.      4. Essential hypertension with goal blood pressure less than 140/90  Stable.     5. Urinary retention  Reviewed options.  Told him I assume prostatitis, and the bactrim works.  Not a great long term solution.  Asking urology to see and advise.   - sulfamethoxazole-trimethoprim (BACTRIM DS/SEPTRA DS) 800-160 MG per tablet; Take 1 tablet by mouth 2 times daily  Dispense: 60 tablet; Refill: 0  - UROLOGY ADULT REFERRAL    6. Umbilical hernia with gangrene  Chronic ulcer.  Large hernia.  Asking surgery to see.   - GENERAL SURG ADULT REFERRAL    7. IDDM (insulin dependent diabetes mellitus) (H)  As above.   - BASAGLAR 100 UNIT/ML injection; 12 units daily subq  Dispense: 15 mL; Refill: 3        (E66.2) " Hypoventilation associated with obesity syndrome (H)  Comment: reviewed.    Plan: he needs the oxygen.  This is an addendum as apparently it wasn't covered unless I added this more specifically..      Moreno Desai MD  [facility]    Addendum:  Patient had a desaturation to 88% on room air at rest.  Patient needs continual oxygen therapy 24 hours a day.      I was contacted that I have to use a specific dx for his oxygen.  I tis chronic respiratory failure due to hypoxia.  This fits with his hypoxia.  I updated the dx list from this visit.

## 2018-10-08 NOTE — NURSING NOTE
"Chief Complaint   Patient presents with     Diabetes       Initial /72  Pulse 83  Temp 97.6  F (36.4  C) (Tympanic)  Ht 5' 10\" (1.778 m)  Wt (!) 365 lb (165.6 kg)  SpO2 (!) 88%  BMI 52.37 kg/m2 Estimated body mass index is 52.37 kg/(m^2) as calculated from the following:    Height as of this encounter: 5' 10\" (1.778 m).    Weight as of this encounter: 365 lb (165.6 kg).  Medication Reconciliation: complete    Carole Prajapati LPN  "

## 2018-10-08 NOTE — PROGRESS NOTES

## 2018-10-08 NOTE — MR AVS SNAPSHOT
After Visit Summary   10/8/2018    Trey Washington    MRN: [id number]           Patient Information     Date Of Birth          1935        Visit Information        Provider Department      10/8/2018 8:15 AM Moreno Desai MD [facility]        Today's Diagnoses     Chronic pain syndrome    -  1    Obstructive sleep apnea        Controlled type 2 diabetes mellitus without complication, with long-term current use of insulin (H)        Essential hypertension with goal blood pressure less than 140/90        Urinary retention        Umbilical hernia with gangrene        IDDM (insulin dependent diabetes mellitus) (H)        Need for prophylactic vaccination and inoculation against influenza          Care Instructions    F/u with ongoing concerns.             Follow-ups after your visit        Additional Services     GENERAL SURG ADULT REFERRAL       Your provider has referred you to: general surgery at [facility]    Please be aware that coverage of these services is subject to the terms and limitations of your health insurance plan.  Call member services at your health plan with any benefit or coverage questions.      Please bring the following with you to your appointment:    (1) Any X-Rays, CTs or MRIs which have been performed.  Contact the facility where they were done to arrange for  prior to your scheduled appointment.   (2) List of current medications   (3) This referral request   (4) Any documents/labs given to you for this referral            UROLOGY ADULT REFERRAL       Your provider has referred you to: urology in [place].    Please be aware that coverage of these services is subject to the terms and limitations of your health insurance plan.  Call member services at your health plan with any benefit or coverage questions.      Please bring the following with you to your appointment:    (1) Any X-Rays, CTs or MRIs which have been performed.  Contact the  facility where they were done to arrange for  prior to your scheduled appointment.    (2) List of current medications  (3) This referral request   (4) Any documents/labs given to you for this referral                  Your next 10 appointments already scheduled     Nov 28, 2018  2:30 PM CST   (Arrive by 2:15 PM)   [facility]    [address]  [place] [address]   [phone]            Feb 04, 2019  9:45 AM CST   (Arrive by 9:30 AM)   [facility]   [facility]    [address]  [facility] [address]   [phone]              Who to contact     If you have questions or need follow up information about today's clinic visit or your schedule please contact [facility] directly at [phone].  Normal or non-critical lab and imaging results will be communicated to you by MyChart, letter or phone within 4 business days after the clinic has received the results. If you do not hear from us within 7 days, please contact the clinic through [employer] or phone. If you have a critical or abnormal lab result, we will notify you by phone as soon as possible.  Submit refill requests through [employer] or call your pharmacy and they will forward the refill request to us. Please allow 3 business days for your refill to be completed.          Additional Information About Your Visit        [employer] Information     [employer] gives you secure access to your electronic health record. If you see a primary care provider, you can also send messages to your care team and make appointments. If you have questions, please call your primary care clinic.  If you do not have a primary care provider, please call [phone] and they will assist you.        Care EveryWhere ID     This is your Care EveryWhere ID. This could be used by other organizations  "to access your [facility] medical records  [id number]        Your Vitals Were     Pulse Temperature Height Pulse Oximetry BMI (Body Mass Index)       83 97.6  F (36.4  C) (Tympanic) 5' 10\" (1.778 m) 88% 52.37 kg/m2        Blood Pressure from Last 3 Encounters:   10/08/18 118/72   08/27/18 124/78   07/07/18 174/74    Weight from Last 3 Encounters:   10/08/18 (!) 365 lb (165.6 kg)   08/27/18 (!) 360 lb (163.3 kg)   12/22/17 (!) 361 lb 12.4 oz (164.1 kg)              We Performed the Following     ADMIN INFLUENZA (For MEDICARE Patients ONLY) []     GENERAL SURG ADULT REFERRAL     HC FLU VACCINE, INCREASED ANTIGEN, PRESV FREE     Pain Drug Scr UR W Rptd Meds     UROLOGY ADULT REFERRAL          Today's Medication Changes          These changes are accurate as of 10/8/18 10:15 AM.  If you have any questions, ask your nurse or doctor.               These medicines have changed or have updated prescriptions.        Dose/Directions    BASAGLAR 100 UNIT/ML injection   This may have changed:  additional instructions   Used for:  IDDM (insulin dependent diabetes mellitus) (H)        12 units daily subq   Quantity:  15 mL   Refills:  3       CAPOTEN PO   Indication:  High Blood Pressure Disorder   This may have changed:  Another medication with the same name was removed. Continue taking this medication, and follow the directions you see here.        Dose:  50 mg   Take 50 mg by mouth 3 times daily   Refills:  0       tamsulosin 0.4 MG capsule   Commonly known as:  FLOMAX   This may have changed:  Another medication with the same name was removed. Continue taking this medication, and follow the directions you see here.   Used for:  Benign prostatic hyperplasia, unspecified whether lower urinary tract symptoms present        TAKE 2 CAPSULES BY MOUTH DAILY   Quantity:  90 capsule   Refills:  3            Where to get your medicines      These medications were sent to [facility] [place], [place] - [address] [place] [address]  " "[address] [address]     Phone:  [phone]     BASAGLAR 100 UNIT/ML injection    sulfamethoxazole-trimethoprim 800-160 MG per tablet                Primary Care Provider Office Phone # Fax #    Moreno Desai [phone]       [address] [place] [address]        Equal Access to Services     [facility] : Hadii aad ku hadasho Soomaali, waaxda luqadaha, qaybta kaalmada adeegyada, waxay idiin hayaan adeeg kharcenio laroselia . So [facility] [phone].    ATENCIÓN: Si habla español, tiene a cabrera disposición servicios gratuitos de asistencia lingüística. Mereditheric al [phone].    We comply with applicable federal civil rights laws and [place] laws. We do not discriminate on the basis of race, color, national origin, age, disability, sex, sexual orientation, or gender identity.            Thank you!     Thank you for choosing [facility]  for your care. Our goal is always to provide you with excellent care. Hearing back from our patients is one way we can continue to improve our services. Please take a few minutes to complete the written survey that you may receive in the mail after your visit with us. Thank you!             Your Updated Medication List - Protect others around you: Learn how to safely use, store and throw away your medicines at www.disposemymeds.org.          This list is accurate as of 10/8/18 10:15 AM.  Always use your most recent med list.                   Brand Name Dispense Instructions for use Diagnosis    allopurinol 300 MG tablet    ZYLOPRIM    90 tablet    Take 1 tablet (300 mg) by mouth daily    Idiopathic gout, unspecified chronicity, unspecified site       AQUACEL-AG EXTRA HYDROFIBER 4\"X5\" Pads     5 each    Externally apply 0.25 each topically every other day Use as directed by wound center    Open wound of abdomen, subsequent encounter       aspirin 81 MG tablet      Take 1 tablet by mouth every other day        BASAGLAR " 100 UNIT/ML injection     15 mL    12 units daily subq    IDDM (insulin dependent diabetes mellitus) (H)       blood glucose monitoring lancets     300 each    USE AS DIRECTED TESTING BLOOD SUGAR THREE TIMES DAILY OR AS DIRECTED    IDDM (insulin dependent diabetes mellitus) (H)       blood glucose monitoring meter device kit     1 kit    Use to test blood sugars 2 times daily or as directed.    IDDM (insulin dependent diabetes mellitus) (H)       * blood glucose monitoring test strip    KEO CONTOUR    300 each    Use to test blood sugars 3 times daily or as directed.    IDDM (insulin dependent diabetes mellitus) (H)       * blood glucose monitoring test strip    no brand specified    100 strip    Use to test blood sugars 3 times daily or as directed- true matrix test strips.    DM2 (diabetes mellitus, type 2) (H)       calcium carbonate 500 mg (elemental) 500 MG tablet    OSCAL;OYSTER SHELL CALCIUM    180 tablet    Take 1 tablet (500 mg) by mouth 2 times daily    HTN (hypertension)       CAPOTEN PO      Take 50 mg by mouth 3 times daily        cyanocolbalamin 500 MCG tablet    vitamin  B-12    180 tablet    TAKE 2 TABLETS BY MOUTH EVERY DAY    Vitamin deficiency       DOCQLACE 100 MG capsule   Generic drug:  docusate sodium     100 capsule    TAKE 1 CAPSULE BY MOUTH TWICE DAILY    Constipation       fish oil-omega-3 fatty acids 1000 MG capsule      Take 1,000 mg by mouth daily        insulin aspart 100 UNIT/ML injection    NovoLOG FLEXPEN    9 mL    INJECT 8 UNITS SUBCUTANEOUS with lunch    Type 2 diabetes mellitus with diabetic nephropathy, with long-term current use of insulin (H), Physical deconditioning       * insulin pen needle 30G X 8 MM    NOVOFINE    100 each    USE 4 PENS NEEDLES DAILY OR AS DIRECTED    IDDM (insulin dependent diabetes mellitus) (H)       * UNIFINE PENTIPS 31G X 8 MM   Generic drug:  insulin pen needle     200 each    USE 4 PEN NEEDLES DAILY OR AS DIRECTED    IDDM (insulin dependent  diabetes mellitus) (H)       ketoconazole 2 % cream    NIZORAL     Apply topically 2 times daily as needed        levETIRAcetam 1000 MG Tabs     60 tablet    Take 1 tablet by mouth 2 times daily    Seizure disorder (H)       metFORMIN 1000 MG tablet    GLUCOPHAGE    90 tablet    TAKE 1 TABLET BY MOUTH ONCE DAILY WITH A MEAL    IDDM (insulin dependent diabetes mellitus) (H)       * miconazole 2 % powder    MICATIN; MICRO GUARD     Apply topically 2 times daily as needed        * miconazole 2 % powder    MICATIN; MICRO GUARD          * order for DME     1 each    Equipment being ordered: Oxygen 2 LPM per nasal cannula during the day, portable also    Hypoxia       * order for DME     1 each    Equipment being ordered: Wheelchair    Morbid obesity, unspecified obesity type (H)       * order for DME     1 each    Equipment being ordered: Meplilex Transfer 6 X 8 inch ([id number]) - disp 3;  Aquacel AG 4 X 5 ([id number]); disp 3 - Refills 6    Skin ulcer, limited to breakdown of skin (H)       order for DME     1 Units    4x4 bulk SOFT gauze (disp 1 sleeve, refill x 5)    Ulcer of skin (H)       order for DME     1 each    Equipment being ordered: Oxygen concentrator and supplies and portable tank for travel.    Obstructive sleep apnea       order for DME     1 each    Equipment being ordered: wheeled walker    Right hip pain       polyethylene glycol powder    MIRALAX/GLYCOLAX    527 g    TAKE 17 GRAMS BY MOUTH DAILY MIXED AS DIRECTED.    Constipation       sertraline 100 MG tablet    ZOLOFT    90 tablet    TAKE 1 TABLET BY MOUTH ONCE DAILY    Dysthymia       simvastatin 10 MG tablet    ZOCOR    90 tablet    TAKE 1 TABLET BY MOUTH NIGHTLY AT BEDTIME    Hypercholesterolemia       sulfamethoxazole-trimethoprim 800-160 MG per tablet    BACTRIM DS/SEPTRA DS    60 tablet    Take 1 tablet by mouth 2 times daily    Urinary retention       T.E.D. BELOW KNEE/L-REGULAR Misc     6 each    Apply in am and take off in the evening    Swelling  of limb       tamsulosin 0.4 MG capsule    FLOMAX    90 capsule    TAKE 2 CAPSULES BY MOUTH DAILY    Benign prostatic hyperplasia, unspecified whether lower urinary tract symptoms present       terazosin 10 MG capsule    HYTRIN    90 capsule    TAKE 1 CAPSULE BY MOUTH NIGHTLY AT BEDTIME    Essential hypertension       traMADol 50 MG tablet    ULTRAM    90 tablet    TAKE ONE TABLET BY MOUTH EVERY 8 HOURS AS NEEDED MODERATE PAIN    Type 2 diabetes mellitus with diabetic nephropathy, with long-term current use of insulin (H), Chronic pain syndrome, Physical deconditioning, Osteoarthrosis involving lower leg       TYLENOL PO      Take 1,000 mg by mouth At Bedtime        UNABLE TO FIND      CPAP        VITAMIN C PO      Take 500 mg by mouth daily        VITAMIN D HIGH POTENCY 1000 units Caps     90 capsule    TAKE 1 CAPSULE BY MOUTH DAILY    Vitamin D deficiency       vitamin E 100 UNIT capsule    TOCOPHEROL     Take 100 Units by mouth daily Uncertain of dose        * Notice:  This list has 9 medication(s) that are the same as other medications prescribed for you. Read the directions carefully, and ask your doctor or other care provider to review them with you.

## 2018-10-16 NOTE — TELEPHONE ENCOUNTER
levETIRAcetam      Last Written Prescription Date:  8/22/18  Last Fill Quantity: 60,   # refills: 1  Last Office Visit: 10/8/18  Future Office visit:

## 2018-11-12 NOTE — TELEPHONE ENCOUNTER
Controlled Substance Refill Request for traMADol (ULTRAM) 50 MG tablet  Problem List Complete:  Yes    Last Written Prescription Date:  10/1/18  Last Fill Quantity: 90,   # refills: 0    Last Office Visit with [facility] primary care provider: 10/8/18    Future Office visit:   Next 5 appointments (look out 90 days)     Feb 04, 2019  9:45 AM CST   (Arrive by 9:30 AM)   SHORT with Moreno Desai MD   [facility]    [address] [place] [address]   [phone]                  Controlled substance agreement on file: Yes:  Date 10/19/17.     Processing:  Fax Rx to [facility] pharmacy

## 2018-11-28 NOTE — MR AVS SNAPSHOT
After Visit Summary   11/28/2018    Trey Washington    MRN: [id number]           Patient Information     Date Of Birth          1935        Visit Information        Provider Department      11/28/2018 2:30 PM Raza Servin MD [facility]        Today's Diagnoses     Urinary retention           Follow-ups after your visit        Your next 10 appointments already scheduled     Feb 04, 2019  9:45 AM CST   (Arrive by 9:30 AM)   SHORT with Moreno Desai MD   [facility]    [address]  [facility] [address]   [phone]              Who to contact     If you have questions or need follow up information about today's clinic visit or your schedule please contact [facility] directly at [phone].  Normal or non-critical lab and imaging results will be communicated to you by MyChart, letter or phone within 4 business days after the clinic has received the results. If you do not hear from us within 7 days, please contact the clinic through MyChart or phone. If you have a critical or abnormal lab result, we will notify you by phone as soon as possible.  Submit refill requests through Prezi or call your pharmacy and they will forward the refill request to us. Please allow 3 business days for your refill to be completed.          Additional Information About Your Visit        MyChart Information     Prezi gives you secure access to your electronic health record. If you see a primary care provider, you can also send messages to your care team and make appointments. If you have questions, please call your primary care clinic.  If you do not have a primary care provider, please call [phone] and they will assist you.        Care EveryWhere ID     This is your Care EveryWhere ID. This could be used by other organizations to access your [place] medical records  [id number]        Your  "Vitals Were     Pulse Temperature Respirations Height Pulse Oximetry BMI (Body Mass Index)    78 98.2  F (36.8  C) (Tympanic) 24 1.778 m (5' 10\") 97% 52.37 kg/m2       Blood Pressure from Last 3 Encounters:   11/28/18 120/72   10/08/18 118/72   08/27/18 124/78    Weight from Last 3 Encounters:   11/28/18 (!) 165.6 kg (365 lb)   10/08/18 (!) 165.6 kg (365 lb)   08/27/18 (!) 163.3 kg (360 lb)              Today, you had the following     No orders found for display         Today's Medication Changes          These changes are accurate as of 11/28/18  4:59 PM.  If you have any questions, ask your nurse or doctor.               These medicines have changed or have updated prescriptions.        Dose/Directions    miconazole 2 % external powder   Commonly known as:  MICATIN/MICRO GUARD   This may have changed:  Another medication with the same name was removed. Continue taking this medication, and follow the directions you see here.   Changed by:  Raza Servin MD        Apply topically 2 times daily as needed   Refills:  0                Primary Care Provider Office Phone # Fax #    Moreno Desai [phone]       [address] [place] [address]        Equal Access to Services     WISAM SANTIAGO AH: Hadii sofy milian hadasho Soomaali, waaxda luqadaha, qaybta kaalmada adeegyada, waxyuliya martínez. So [facility] [phone].    ATENCIÓN: Si habla español, tiene a cabrera disposición servicios gratuitos de asistencia lingüística. Llame al [phone].    We comply with applicable federal civil rights laws and [place] laws. We do not discriminate on the basis of race, color, national origin, age, disability, sex, sexual orientation, or gender identity.            Thank you!     Thank you for choosing [facility]  for your care. Our goal is always to provide you with excellent care. Hearing back from our patients is one way we can continue to improve our services. " "Please take a few minutes to complete the written survey that you may receive in the mail after your visit with us. Thank you!             Your Updated Medication List - Protect others around you: Learn how to safely use, store and throw away your medicines at www.disposemymeds.org.          This list is accurate as of 11/28/18  4:59 PM.  Always use your most recent med list.                   Brand Name Dispense Instructions for use Diagnosis    allopurinol 300 MG tablet    ZYLOPRIM    90 tablet    Take 1 tablet (300 mg) by mouth daily    Idiopathic gout, unspecified chronicity, unspecified site       AQUACEL-AG EXTRA HYDROFIBER 4\"X5\" Pads     5 each    Externally apply 0.25 each topically every other day Use as directed by wound center    Open wound of abdomen, subsequent encounter       aspirin 81 MG tablet    ASA     Take 1 tablet by mouth every other day        blood glucose monitoring lancets     300 each    USE AS DIRECTED TESTING BLOOD SUGAR THREE TIMES DAILY OR AS DIRECTED    IDDM (insulin dependent diabetes mellitus) (H)       blood glucose monitoring meter device kit     1 kit    Use to test blood sugars 2 times daily or as directed.    IDDM (insulin dependent diabetes mellitus) (H)       * blood glucose monitoring test strip    KEO CONTOUR    300 each    Use to test blood sugars 3 times daily or as directed.    IDDM (insulin dependent diabetes mellitus) (H)       * blood glucose monitoring test strip    NO BRAND SPECIFIED    100 strip    Use to test blood sugars 3 times daily or as directed- true matrix test strips.    DM2 (diabetes mellitus, type 2) (H)       calcium carbonate 500 mg (elemental) 500 MG tablet    OSCAL;OYSTER SHELL CALCIUM    180 tablet    Take 1 tablet (500 mg) by mouth 2 times daily    HTN (hypertension)       CAPOTEN PO      Take 50 mg by mouth 3 times daily        DOCQLACE 100 MG capsule   Generic drug:  docusate sodium     100 capsule    TAKE 1 CAPSULE BY MOUTH TWICE DAILY    " Constipation       fish oil-omega-3 fatty acids 1000 MG capsule      Take 1,000 mg by mouth daily        insulin aspart 100 UNIT/ML pen    NovoLOG FLEXPEN    9 mL    INJECT 8 UNITS SUBCUTANEOUS with lunch    Type 2 diabetes mellitus with diabetic nephropathy, with long-term current use of insulin (H), Physical deconditioning       insulin glargine 100 UNIT/ML pen     15 mL    12 units daily subq    IDDM (insulin dependent diabetes mellitus) (H)       * insulin pen needle 30G X 8 MM miscellaneous    NOVOFINE    100 each    USE 4 PENS NEEDLES DAILY OR AS DIRECTED    IDDM (insulin dependent diabetes mellitus) (H)       * UNIFINE PENTIPS 31G X 8 MM miscellaneous   Generic drug:  insulin pen needle     200 each    USE 4 PEN NEEDLES DAILY OR AS DIRECTED    IDDM (insulin dependent diabetes mellitus) (H)       ketoconazole 2 % external cream    NIZORAL     Apply topically 2 times daily as needed        levETIRAcetam 1000 MG tablet    KEPPRA    60 tablet    TAKE (1) TABLET BY MOUTH TWICE A DAY.    Seizure disorder (H)       metFORMIN 1000 MG tablet    GLUCOPHAGE    90 tablet    TAKE 1 TABLET BY MOUTH ONCE DAILY WITH A MEAL    IDDM (insulin dependent diabetes mellitus) (H)       miconazole 2 % external powder    MICATIN/MICRO GUARD     Apply topically 2 times daily as needed        * order for DME     1 each    Equipment being ordered: Oxygen 2 LPM per nasal cannula during the day, portable also    Hypoxia       * order for DME     1 each    Equipment being ordered: Wheelchair    Morbid obesity, unspecified obesity type (H)       * order for DME     1 each    Equipment being ordered: Meplilex Transfer 6 X 8 inch ([id number]) - disp 3;  Aquacel AG 4 X 5 ([id number]); disp 3 - Refills 6    Skin ulcer, limited to breakdown of skin (H)       order for DME     1 Units    4x4 bulk SOFT gauze (disp 1 sleeve, refill x 5)    Ulcer of skin (H)       order for DME     1 each    Equipment being ordered: Oxygen concentrator and supplies and  portable tank for travel.    Obstructive sleep apnea       order for DME     1 each    Equipment being ordered: wheeled walker    Right hip pain       polyethylene glycol powder    MIRALAX/GLYCOLAX    527 g    TAKE 17 GRAMS BY MOUTH DAILY MIXED AS DIRECTED.    Constipation       sertraline 100 MG tablet    ZOLOFT    90 tablet    TAKE 1 TABLET BY MOUTH ONCE DAILY    Dysthymia       simvastatin 10 MG tablet    ZOCOR    90 tablet    TAKE 1 TABLET BY MOUTH NIGHTLY AT BEDTIME    Hypercholesterolemia       sulfamethoxazole-trimethoprim 800-160 MG tablet    BACTRIM DS/SEPTRA DS    60 tablet    Take 1 tablet by mouth 2 times daily    Urinary retention       T.E.D. BELOW KNEE/L-REGULAR Misc     6 each    Apply in am and take off in the evening    Swelling of limb       tamsulosin 0.4 MG capsule    FLOMAX    90 capsule    TAKE 2 CAPSULES BY MOUTH DAILY    Benign prostatic hyperplasia, unspecified whether lower urinary tract symptoms present       terazosin 10 MG capsule    HYTRIN    90 capsule    TAKE 1 CAPSULE BY MOUTH NIGHTLY AT BEDTIME    Essential hypertension       traMADol 50 MG tablet    ULTRAM    90 tablet    TAKE 1 TABLET BY MOUTH EVERY 8 HOURS AS NEEDED FOR MODERATE PAIN    Type 2 diabetes mellitus with diabetic nephropathy, with long-term current use of insulin (H), Chronic pain syndrome, Physical deconditioning, Osteoarthrosis involving lower leg       TYLENOL PO      Take 1,000 mg by mouth At Bedtime        UNABLE TO FIND      CPAP        vitamin B-12 500 MCG tablet    CYANOCOBALAMIN    180 tablet    TAKE 2 TABLETS BY MOUTH EVERY DAY    Vitamin deficiency       VITAMIN C PO      Take 500 mg by mouth daily        VITAMIN D HIGH POTENCY 1000 units Caps     90 capsule    TAKE 1 CAPSULE BY MOUTH DAILY    Vitamin D deficiency       vitamin E 100 units (45 mg) capsule    TOCOPHEROL     Take 100 Units by mouth daily Uncertain of dose        * Notice:  This list has 7 medication(s) that are the same as other medications  prescribed for you. Read the directions carefully, and ask your doctor or other care provider to review them with you.

## 2018-11-28 NOTE — TELEPHONE ENCOUNTER
docusate sodium (COLACE) 100 MG capsule      Last Written Prescription Date:  5/1/18  Last Fill Quantity: 100,   # refills: 3  Last Office Visit: 10/8/18  Future Office visit:    Next 5 appointments (look out 90 days)     Feb 04, 2019  9:45 AM CST   (Arrive by 9:30 AM)   SHORT with Moreno Desai MD   [facility] ([facility] )    [address] [facility] [address]   [phone]

## 2018-11-28 NOTE — PROGRESS NOTES
Type of Visit  NPV    Chief Complaint  Incontinence    HPI  Mr. Washington is a 83 year old morbidly obese male who presents with incontinence.  Incontinence started about 6 months ago.  The patient has been diagnosed with a UTI intermittently during this timeframe.  His caregiver states that anytime he is on Bactrim his incontinence resolves.  He denies fevers or chills today.  He also denies fevers or chills during the UTIs.  He denies gross hematuria or dysuria.  He does have a history of diabetes however his blood sugars have been within range.  He also has constipation.      Past Medical History  He  has a past medical history of Chronic kidney disease, stage III (moderate) (H); Obesity, unspecified; Obstructive sleep apnea (adult) (pediatric); Osteoarthrosis, unspecified whether generalized or localized, lower leg; Other abnormal blood chemistry; Other B-complex deficiencies; Other choreas; Pure hypercholesterolemia; Skin ulcer (H); Type II or unspecified type diabetes mellitus without mention of complication, not stated as uncontrolled; and Unspecified essential hypertension.  Patient Active Problem List   Diagnosis     Pure hypercholesterolemia     Chronic kidney disease, stage III (moderate) (H)     Morbid (severe) obesity due to excess calories (H)     Obstructive sleep apnea     ACP (advance care planning)     Skin ulcer (H)     Advanced directives, counseling/discussion     Controlled type 2 diabetes mellitus without complication, with long-term current use of insulin (H)     Type 2 diabetes mellitus with diabetic nephropathy, with long-term current use of insulin (H)     Essential hypertension with goal blood pressure less than 140/90     Convulsive syncope     Right hip pain     Vitamin B12 deficiency     Osteoarthrosis involving lower leg     Chronic pain syndrome     Physical deconditioning       Past Surgical History  He  has a past surgical history that includes Abdomen surgery; orthopedic surgery;  orthopedic surgery (2003); orthopedic surgery; tonsillectomy & adenoidectomy (1942); ENT surgery; Phacoemulsification with standard intraocular lens implant (2/11/2014); Phacoemulsification with standard intraocular lens implant (3/11/2014); colonoscopy; and eye lid surgery.    Medications  He has a current medication list which includes the following prescription(s): acetaminophen, allopurinol, ascorbic acid, aspirin, insulin glargine, blood glucose monitoring, blood glucose monitoring, blood glucose monitoring, blood glucose monitoring, captopril, vitamin d high potency, vitamin b-12, docqlace, t.e.d. below knee/l-regular, fish oil-omega-3 fatty acids, insulin aspart, insulin pen needle, ketoconazole, levetiracetam, metformin, miconazole, order for dme, order for dme, order for dme, order for dme, order for dme, order for dme, calcium carbonate 500 mg (elemental), polyethylene glycol, sertraline, aquacel-ag extra hydrofiber, simvastatin, sulfamethoxazole-trimethoprim, tamsulosin, terazosin, tramadol, UNABLE TO FIND, unifine pentips, and vitamin e.    Allergies  Allergies   Allergen Reactions     Hydrocodone      Intolerance       Penicillins Swelling       Social History  He  reports that he quit smoking about 31 years ago. His smoking use included Cigars. He quit after 10.00 years of use. He has never used smokeless tobacco. He reports that he does not drink alcohol or use illicit drugs.  No drug abuse.    Family History  Family History   Problem Relation Age of Onset     C.A.D. Father      MI     Other - See Comments Father      shell shocked in the war     Diabetes Paternal Grandmother        Review of Systems  I personally reviewed the ROS with the patient.    Nursing Notes:   Allie Rene LPN  11/28/2018  2:59 PM  Signed  Chief Complaint   Patient presents with     Consult     Urinary incontinence with taken off Bactrim.. Per Lloyd.       Initial /72 (BP Location: Other (Comment))  Pulse 78   "Temp 98.2  F (36.8  C) (Tympanic)  Resp 24  Ht 1.778 m (5' 10\")  Wt (!) 165.6 kg (365 lb)  SpO2 97%  BMI 52.37 kg/m2 Estimated body mass index is 52.37 kg/(m^2) as calculated from the following:    Height as of this encounter: 1.778 m (5' 10\").    Weight as of this encounter: 165.6 kg (365 lb).  Medication Reconciliation: complete    Allie Rene LPN    Unintentional weight loss:  No  ecent fever/chills: No   Night sweats: No   Current skin rash: No   Recent hair loss: No   Heat intolerance: No   Cold intolerance: No   Chest pain: No   Palpitations: No   Shortness of breath: Yes  Wheezing: No   Constipation: No   Diarrhea: No   Nausea: No   Vomiting:  No   Kidney/side pain: No   Back pain: No  Frequent headaches: No  Dizziness: No   Leg swelling: No   Calf pain: No      Physical Exam  Vitals:    11/28/18 1424   BP: 120/72   BP Location: Other (Comment)   Pulse: 78   Resp: 24   Temp: 98.2  F (36.8  C)   TempSrc: Tympanic   SpO2: 97%   Weight: (!) 165.6 kg (365 lb)   Height: 1.778 m (5' 10\")     Constitutional: No acute distress.  Alert and cooperative  Obese, wheelchair  Head: NCAT  Eyes: Conjunctivae normal  Cardiovascular: Regular rate.  Pulmonary/Chest: Respirations are even and non-labored bilaterally, no audible wheezing  Abdominal: Soft. No distension, tenderness, masses or guarding.   Neurological: A + O x 3.  Cranial Nerves II-XII grossly intact.  Extremities: BRUCE x 4, Warm. No clubbing.  No cyanosis.    Skin: Pink, warm and dry.  No visible rashes noted.  Psychiatric:  Normal mood and affect  Back:  No left CVA tenderness.  No right CVA tenderness.  Genitourinary:  Nonpalpable bladder    Labs  Results for NHUNG MONTALVO (MRN [id number]) as of 11/28/2018 15:00   6/21/2016 10:00 3/11/2017 19:40 12/17/2017 13:20 12/21/2017 21:15 5/7/2018 10:19   Color Urine Yellow Yellow Yellow Yellow Yellow   Appearance Urine Clear Slightly Cloudy Clear Clear Clear   Glucose Urine Negative Negative Negative " Negative Negative   Bilirubin Urine Negative Negative Negative Negative Negative   Ketones Urine Negative 5 (A) Negative Negative Negative   Specific Gravity Urine 1.015 1.015 1.017 1.015 1.020   pH Urine 7.0 5.5 5.5 5.5 5.5   Protein Albumin Urine 30 (A) 100 (A) 100 (A) 30 (A) 100 (A)   Urobilinogen mg/dL  Normal Normal Normal    Urobilinogen Urine 0.2    0.2   Nitrite Urine Negative Negative Negative Negative Negative   Blood Urine Trace (A) Negative Moderate (A) Negative Negative   Leukocyte Esterase Urine Negative Small (A) Negative Negative Negative   Source Midstream Urine Midstream Urine Midstream Urine Midstream Urine Midstream Urine   WBC Urine NONE SEEN 8 (H) 1 1 0 - 5   RBC Urine O - 2 1 1 1 O - 2   Bacteria Urine  Few (A) None (A) None (A)    Squamous Epithelial /HPF Urine   1     Squamous Epithelial /LPF Urine Few       Mucous Urine  Present (A)  Present (A)        Assessment & Plan  Mr. Washington is a 83 year old male who presents with an unusual complaint of incontinence that is apparently improved with Bactrim in spite of consecutive normal urinalyses over the last 3 years.  I explained I have no way to explain this physiologically and I recommended against continued antibiotics without the presence of a UTI.  I recommended discontinuation of Bactrim.  Continue Miralax for constipation.  We discussed anticholinergic medication however after discussing the potential side effects we elected not to start the medication.  He may follow-up with me as needed.

## 2018-11-28 NOTE — NURSING NOTE
"Chief Complaint   Patient presents with     Consult     Urinary incontinence with taken off Bactrim.. Per Desai.       Initial /72 (BP Location: Other (Comment))  Pulse 78  Temp 98.2  F (36.8  C) (Tympanic)  Resp 24  Ht 1.778 m (5' 10\")  Wt (!) 165.6 kg (365 lb)  SpO2 97%  BMI 52.37 kg/m2 Estimated body mass index is 52.37 kg/(m^2) as calculated from the following:    Height as of this encounter: 1.778 m (5' 10\").    Weight as of this encounter: 165.6 kg (365 lb).  Medication Reconciliation: complete    Allie Rene LPN    "

## 2018-12-12 NOTE — TELEPHONE ENCOUNTER
polyethylene glycol (MIRALAX/GLYCOLAX) powder  Last Written Prescription Date:  8/17/18  Last Fill Quantity: 527g,   # refills: 3  Last Office Visit: 10/8/18  Future Office visit:    Next 5 appointments (look out 90 days)    Feb 04, 2019  9:45 AM CST  (Arrive by 9:30 AM)  [facility]no [facility]  [facility] [address] [facility] [address]  [phone]

## 2018-12-26 NOTE — TELEPHONE ENCOUNTER
traMADol (ULTRAM) 50 MG tablet      Last Written Prescription Date:  11/12/18  Last Fill Quantity: 90,   # refills: 0  Last Office Visit: 10/8/18  Future Office visit:    Next 5 appointments (look out 90 days)    Feb 04, 2019  9:45 AM CST  (Arrive by 9:30 AM)  SHORT with Moreno Desai MD  [facility] [address] [facility] [address]  [phone]           Routing refill request to provider for review/approval because:  Drug not on the [facility], [facility] or  Health refill protocol or controlled substance

## 2019-01-01 ENCOUNTER — MEDICAL CORRESPONDENCE (OUTPATIENT)
Dept: HEALTH INFORMATION MANAGEMENT | Facility: CLINIC | Age: 84
End: 2019-01-01

## 2019-01-01 ENCOUNTER — DOCUMENTATION ONLY (OUTPATIENT)
Dept: OTHER | Facility: CLINIC | Age: 84
End: 2019-01-01

## 2019-01-01 ENCOUNTER — OFFICE VISIT (OUTPATIENT)
Dept: FAMILY MEDICINE | Facility: OTHER | Age: 84
End: 2019-01-01
Attending: FAMILY MEDICINE
Payer: COMMERCIAL

## 2019-01-01 ENCOUNTER — TRANSFERRED RECORDS (OUTPATIENT)
Dept: HEALTH INFORMATION MANAGEMENT | Facility: CLINIC | Age: 84
End: 2019-01-01

## 2019-01-01 VITALS
OXYGEN SATURATION: 98 % | DIASTOLIC BLOOD PRESSURE: 80 MMHG | TEMPERATURE: 96.8 F | SYSTOLIC BLOOD PRESSURE: 130 MMHG | HEART RATE: 76 BPM

## 2019-01-01 DIAGNOSIS — R53.81 PHYSICAL DECONDITIONING: ICD-10-CM

## 2019-01-01 DIAGNOSIS — E78.00 HYPERCHOLESTEROLEMIA: ICD-10-CM

## 2019-01-01 DIAGNOSIS — I10 HTN (HYPERTENSION): ICD-10-CM

## 2019-01-01 DIAGNOSIS — I10 ESSENTIAL HYPERTENSION: Primary | ICD-10-CM

## 2019-01-01 DIAGNOSIS — M10.00 IDIOPATHIC GOUT, UNSPECIFIED CHRONICITY, UNSPECIFIED SITE: Primary | ICD-10-CM

## 2019-01-01 DIAGNOSIS — G40.909 SEIZURE DISORDER (H): ICD-10-CM

## 2019-01-01 DIAGNOSIS — F34.1 DYSTHYMIA: ICD-10-CM

## 2019-01-01 DIAGNOSIS — I10 ESSENTIAL HYPERTENSION: ICD-10-CM

## 2019-01-01 DIAGNOSIS — G89.4 CHRONIC PAIN SYNDROME: Chronic | ICD-10-CM

## 2019-01-01 DIAGNOSIS — N40.0 BENIGN PROSTATIC HYPERPLASIA, UNSPECIFIED WHETHER LOWER URINARY TRACT SYMPTOMS PRESENT: ICD-10-CM

## 2019-01-01 DIAGNOSIS — E11.21 TYPE 2 DIABETES MELLITUS WITH DIABETIC NEPHROPATHY, WITH LONG-TERM CURRENT USE OF INSULIN (H): ICD-10-CM

## 2019-01-01 DIAGNOSIS — I10 ESSENTIAL HYPERTENSION WITH GOAL BLOOD PRESSURE LESS THAN 140/90: ICD-10-CM

## 2019-01-01 DIAGNOSIS — M10.00 IDIOPATHIC GOUT, UNSPECIFIED CHRONICITY, UNSPECIFIED SITE: ICD-10-CM

## 2019-01-01 DIAGNOSIS — Z79.4 TYPE 2 DIABETES MELLITUS WITH DIABETIC NEPHROPATHY, WITH LONG-TERM CURRENT USE OF INSULIN (H): ICD-10-CM

## 2019-01-01 DIAGNOSIS — E11.21 TYPE 2 DIABETES MELLITUS WITH DIABETIC NEPHROPATHY, WITH LONG-TERM CURRENT USE OF INSULIN (H): Primary | ICD-10-CM

## 2019-01-01 DIAGNOSIS — E66.01 MORBID (SEVERE) OBESITY DUE TO EXCESS CALORIES (H): ICD-10-CM

## 2019-01-01 DIAGNOSIS — N41.1 CHRONIC PROSTATITIS: ICD-10-CM

## 2019-01-01 DIAGNOSIS — Z79.4 TYPE 2 DIABETES MELLITUS WITH DIABETIC NEPHROPATHY, WITH LONG-TERM CURRENT USE OF INSULIN (H): Primary | ICD-10-CM

## 2019-01-01 DIAGNOSIS — K59.00 CONSTIPATION: ICD-10-CM

## 2019-01-01 LAB
ALBUMIN SERPL-MCNC: 3.6 G/DL (ref 3.4–5)
ALP SERPL-CCNC: 44 U/L (ref 40–150)
ALT SERPL W P-5'-P-CCNC: 15 U/L (ref 0–70)
ANION GAP SERPL CALCULATED.3IONS-SCNC: 9 MMOL/L (ref 3–14)
AST SERPL W P-5'-P-CCNC: 13 U/L (ref 0–45)
BILIRUB SERPL-MCNC: 0.5 MG/DL (ref 0.2–1.3)
BUN SERPL-MCNC: 34 MG/DL (ref 7–30)
CALCIUM SERPL-MCNC: 9.6 MG/DL (ref 8.5–10.1)
CHLORIDE SERPL-SCNC: 104 MMOL/L (ref 94–109)
CHOLEST SERPL-MCNC: 160 MG/DL
CO2 SERPL-SCNC: 29 MMOL/L (ref 20–32)
CREAT SERPL-MCNC: 1.65 MG/DL (ref 0.66–1.25)
EST. AVERAGE GLUCOSE BLD GHB EST-MCNC: 128 MG/DL
GFR SERPL CREATININE-BSD FRML MDRD: 38 ML/MIN/{1.73_M2}
GLUCOSE SERPL-MCNC: 106 MG/DL (ref 70–99)
HBA1C MFR BLD: 6.1 % (ref 0–5.6)
HDLC SERPL-MCNC: 39 MG/DL
LDLC SERPL CALC-MCNC: 93 MG/DL
NONHDLC SERPL-MCNC: 121 MG/DL
POTASSIUM SERPL-SCNC: 4.2 MMOL/L (ref 3.4–5.3)
PROT SERPL-MCNC: 7.3 G/DL (ref 6.8–8.8)
SODIUM SERPL-SCNC: 142 MMOL/L (ref 133–144)
TRIGL SERPL-MCNC: 138 MG/DL

## 2019-01-01 PROCEDURE — G0463 HOSPITAL OUTPT CLINIC VISIT: HCPCS

## 2019-01-01 PROCEDURE — 83036 HEMOGLOBIN GLYCOSYLATED A1C: CPT | Mod: ZL | Performed by: FAMILY MEDICINE

## 2019-01-01 PROCEDURE — 80061 LIPID PANEL: CPT | Mod: ZL | Performed by: FAMILY MEDICINE

## 2019-01-01 PROCEDURE — 99214 OFFICE O/P EST MOD 30 MIN: CPT | Performed by: FAMILY MEDICINE

## 2019-01-01 PROCEDURE — 80053 COMPREHEN METABOLIC PANEL: CPT | Mod: ZL | Performed by: FAMILY MEDICINE

## 2019-01-01 PROCEDURE — 36415 COLL VENOUS BLD VENIPUNCTURE: CPT | Mod: ZL | Performed by: FAMILY MEDICINE

## 2019-01-01 PROCEDURE — 40000788 ZZHCL STATISTIC ESTIMATED AVERAGE GLUCOSE: Mod: ZL | Performed by: FAMILY MEDICINE

## 2019-01-01 RX ORDER — ALLOPURINOL 300 MG/1
TABLET ORAL
Qty: 90 TABLET | Refills: 0 | Status: SHIPPED | OUTPATIENT
Start: 2019-01-01

## 2019-01-01 RX ORDER — TAMSULOSIN HYDROCHLORIDE 0.4 MG/1
CAPSULE ORAL
Qty: 90 CAPSULE | Refills: 3 | Status: SHIPPED | OUTPATIENT
Start: 2019-01-01

## 2019-01-01 RX ORDER — CAPTOPRIL 100 MG/1
TABLET ORAL
Qty: 90 TABLET | Refills: 0 | Status: SHIPPED | OUTPATIENT
Start: 2019-01-01

## 2019-01-01 RX ORDER — METHYLPREDNISOLONE SODIUM SUCCINATE 125 MG/2ML
INJECTION, POWDER, FOR SOLUTION INTRAMUSCULAR; INTRAVENOUS
Qty: 200 EACH | Refills: 11 | Status: SHIPPED | OUTPATIENT
Start: 2019-01-01

## 2019-01-01 RX ORDER — SERTRALINE HYDROCHLORIDE 100 MG/1
TABLET, FILM COATED ORAL
Qty: 90 TABLET | Refills: 0 | Status: SHIPPED | OUTPATIENT
Start: 2019-01-01

## 2019-01-01 RX ORDER — POLYETHYLENE GLYCOL 3350 17 G/17G
POWDER, FOR SOLUTION ORAL
Qty: 510 G | Refills: 3 | Status: SHIPPED | OUTPATIENT
Start: 2019-01-01

## 2019-01-01 RX ORDER — CAPTOPRIL 100 MG/1
TABLET ORAL
Qty: 90 TABLET | Refills: 0 | Status: SHIPPED | OUTPATIENT
Start: 2019-01-01 | End: 2019-01-01

## 2019-01-01 RX ORDER — DOCUSATE SODIUM 100 MG/1
CAPSULE, LIQUID FILLED ORAL
Qty: 100 CAPSULE | Refills: 3 | Status: SHIPPED | OUTPATIENT
Start: 2019-01-01

## 2019-01-01 RX ORDER — LEVETIRACETAM 1000 MG/1
TABLET ORAL
Qty: 60 TABLET | Refills: 0 | Status: SHIPPED | OUTPATIENT
Start: 2019-01-01 | End: 2019-01-01

## 2019-01-01 RX ORDER — LEVETIRACETAM 1000 MG/1
TABLET ORAL
Qty: 60 TABLET | Refills: 3 | Status: SHIPPED | OUTPATIENT
Start: 2019-01-01

## 2019-01-01 RX ORDER — CALCIUM CITRATE/VITAMIN D3 200MG-6.25
TABLET ORAL
Qty: 100 STRIP | Refills: 2 | Status: SHIPPED | OUTPATIENT
Start: 2019-01-01

## 2019-01-01 RX ORDER — TERAZOSIN 10 MG/1
CAPSULE ORAL
Qty: 90 CAPSULE | Refills: 1 | Status: SHIPPED | OUTPATIENT
Start: 2019-01-01

## 2019-01-01 RX ORDER — CALCIUM CARBONATE 500(1250)
TABLET ORAL
Qty: 180 TABLET | Refills: 0 | Status: SHIPPED | OUTPATIENT
Start: 2019-01-01

## 2019-01-01 RX ORDER — SERTRALINE HYDROCHLORIDE 100 MG/1
TABLET, FILM COATED ORAL
Qty: 90 TABLET | Refills: 0 | Status: SHIPPED | OUTPATIENT
Start: 2019-01-01 | End: 2019-01-01

## 2019-01-01 RX ORDER — SULFAMETHOXAZOLE/TRIMETHOPRIM 800-160 MG
1 TABLET ORAL 2 TIMES DAILY
Qty: 60 TABLET | Refills: 5 | Status: SHIPPED | OUTPATIENT
Start: 2019-01-01 | End: 2020-02-04

## 2019-01-01 RX ORDER — SIMVASTATIN 10 MG
10 TABLET ORAL AT BEDTIME
Qty: 90 TABLET | Refills: 2 | Status: SHIPPED | OUTPATIENT
Start: 2019-01-01

## 2019-01-01 ASSESSMENT — PAIN SCALES - GENERAL: PAINLEVEL: NO PAIN (0)

## 2019-01-22 NOTE — TELEPHONE ENCOUNTER
ACE Inhibitors (Including Combos) Protocol Failed   captopril (CAPOTEN) 100 MG tablet [Pharmacy Med Name: CAPTOPRIL 100 MG TABLET]   Rerun Protocol (1/21/2019 1:02 PM)      Normal serum creatinine on file in past 12 months          Recent Labs   Lab Test 08/27/18  1141   CR 2.04*           10/08/18 0822 Discontinue Carole Prajapati LPN     pcp to advise on this medication    captopril      Last Written Prescription Date:  7/11/18 end 10/8/18  Last Fill Quantity: 270,   # refills: 2  Last Office Visit: 10/8/18  Future Office visit:    Next 5 appointments (look out 90 days)    Feb 04, 2019  9:45 AM CST  (Arrive by 9:30 AM)  SHORT with Moreno Desai MD  [facility] [address] [address]  [phone]           Gout Agents Protocol Failed   allopurinol (ZYLOPRIM) 300 MG tablet [Pharmacy Med Name: ALLOPURINOL 300 MG TABLET]   Rerun Protocol (1/21/2019 1:02 PM)      Has Uric Acid on file in past 12 months and value is less than 6          Recent Labs   Lab Test 10/18/17  0933   URIC 5.2   If level is 6mg/dL or greater, ok to refill one time and refer to provider.          Normal serum creatinine on file in the past 12 months          Recent Labs   Lab Test 08/27/18  1141   CR 2.04*             allopurinol      Last Written Prescription Date:  8/17/18  Last Fill Quantity: 90,   # refills: 0  Last Office Visit: 10/8/18  Future Office visit:    Next 5 appointments (look out 90 days)    Feb 04, 2019  9:45 AM CST  (Arrive by 9:30 AM)  SHORT with Moreno Desai MD  [facility] [address] [facility] [address]  [phone]           Routing refill request to provider for review/approval because:  Lab work due pcp to advise.

## 2019-01-31 NOTE — PROGRESS NOTES
SUBJECTIVE:                                                    Trey Washington is a 83 year old male who presents to clinic today for the following health issues:    Diabetes Follow-up    Patient is checking blood sugars: twice daily.    Blood sugar testing frequency justification: Adjustment of medication(s)  Results are as follows:         am - 98              postprandial after supper- 183    Diabetic concerns: None     Symptoms of hypoglycemia (low blood sugar): none     Paresthesias (numbness or burning in feet) or sores: No     Date of last diabetic eye exam: 1 year prior    Diabetes Management Resources    Hyperlipidemia Follow-Up      Rate your low fat/cholesterol diet?: not monitoring fat    Taking statin?  Yes, no muscle aches from statin    Other lipid medications/supplements?:  none    Hypertension Follow-up      Outpatient blood pressures are being checked at home.  Results are WNL.    Low Salt Diet: low salt    BP Readings from Last 2 Encounters:   02/04/19 130/80   11/28/18 120/72     Hemoglobin A1C (%)   Date Value   08/27/2018 5.6   05/07/2018 5.5     LDL Cholesterol Calculated (mg/dL)   Date Value   05/07/2018 88   04/03/2017 103 (H)       Amount of exercise or physical activity: 4-5 days/week for an average of 30-45 minutes    Problems taking medications regularly: No    Medication side effects: none    Diet: low salt, low fat/cholesterol and diabetic        PROBLEMS TO ADD ON...    Diabetic foot exam  1. foot deformity   No  2. Current or previous foot ulceration     No  3. Current or previous pre-ulcerative calluses     No  4. previous partial amputation of one or both feet or complete amputation of one foot     No  5. peripheral neuropathy with evidence of callus formation     No  6. poor circulation     Yes    Problem list and histories reviewed & adjusted, as indicated.  Additional history: having terrible urine issues since off the bactrim.  Wants to be back on.  Urology notes reviewed.   Lengthy review about this.      Patient Active Problem List   Diagnosis     Pure hypercholesterolemia     Chronic kidney disease, stage III (moderate) (H)     Morbid (severe) obesity due to excess calories (H)     Obstructive sleep apnea     ACP (advance care planning)     Skin ulcer (H)     Advanced directives, counseling/discussion     Controlled type 2 diabetes mellitus without complication, with long-term current use of insulin (H)     Type 2 diabetes mellitus with diabetic nephropathy, with long-term current use of insulin (H)     Essential hypertension with goal blood pressure less than 140/90     Convulsive syncope     Right hip pain     Vitamin B12 deficiency     Osteoarthrosis involving lower leg     Chronic pain syndrome     Physical deconditioning     Past Surgical History:   Procedure Laterality Date     COLONOSCOPY       ENT SURGERY      lanced abcess in left ear     eye lid surgery       GENITOURINARY SURGERY      removal of kidney stones     ORTHOPEDIC SURGERY      knee arthroscopy     ORTHOPEDIC SURGERY      bilateral knee replacement     ORTHOPEDIC SURGERY      right knee arthroscopy     PHACOEMULSIFICATION WITH STANDARD INTRAOCULAR LENS IMPLANT  2014    Procedure: PHACOEMULSIFICATION WITH STANDARD INTRAOCULAR LENS IMPLANT;  CATARACT EXTRACTION WITH INTRA OCULAR LENS RIGHT;  Surgeon: Luis James MD;  Location: [facility]     PHACOEMULSIFICATION WITH STANDARD INTRAOCULAR LENS IMPLANT  3/11/2014    Procedure: PHACOEMULSIFICATION WITH STANDARD INTRAOCULAR LENS IMPLANT;  CATARACT EXTRACTION WITH INTRA OCULAR LENS LEFT  ;  Surgeon: Luis James MD;  Location: [facility]     TONSILLECTOMY & ADENOIDECTOMY         Social History     Tobacco Use     Smoking status: Former Smoker     Years: 10.00     Types: Cigars     Start date: 1977     Last attempt to quit: 1987     Years since quittin.3     Smokeless tobacco: Never Used   Substance Use Topics     Alcohol use: No     Alcohol/week:  0.0 oz     Family History   Problem Relation Age of Onset     C.A.D. Father         MI     Other - See Comments Father         shell shocked in the war     Diabetes Paternal Grandmother          Current Outpatient Medications   Medication Sig Dispense Refill     Acetaminophen (TYLENOL PO) Take 1,000 mg by mouth At Bedtime        allopurinol (ZYLOPRIM) 300 MG tablet TAKE (1) TABLET BY MOUTH DAILY 90 tablet 0     Ascorbic Acid (VITAMIN C PO) Take 500 mg by mouth daily        aspirin 81 MG tablet Take 1 tablet by mouth every other day        BASAGLAR 100 UNIT/ML injection 12 units daily subq 15 mL 3     blood glucose monitoring ([employer] CONTOUR MONITOR) meter device kit Use to test blood sugars 2 times daily or as directed. 1 kit 0     blood glucose monitoring (KEO CONTOUR) test strip Use to test blood sugars 3 times daily or as directed. 300 each 3     blood glucose monitoring (KEO MICROLET) lancets USE AS DIRECTED TESTING BLOOD SUGAR THREE TIMES DAILY OR AS DIRECTED 300 each 1     blood glucose monitoring (NO BRAND SPECIFIED) test strip Use to test blood sugars 3 times daily or as directed- true matrix test strips. 100 strip 5     Captopril (CAPOTEN PO) Take 50 mg by mouth 3 times daily       Cholecalciferol (VITAMIN D HIGH POTENCY) 1000 units CAPS TAKE 1 CAPSULE BY MOUTH DAILY 90 capsule 2     cyanocolbalamin (VITAMIN  B-12) 500 MCG tablet TAKE 2 TABLETS BY MOUTH EVERY  tablet 1     docusate sodium (COLACE) 100 MG capsule TAKE (1) CAPSULE BY MOUTH TWICE DAILY. 100 capsule 2     Elastic Bandages & Supports (T.E.D. BELOW KNEE/L-REGULAR) MISC Apply in am and take off in the evening 6 each 11     fish oil-omega-3 fatty acids 1000 MG capsule Take 1,000 mg by mouth daily       insulin aspart (NOVOLOG FLEXPEN) 100 UNIT/ML injection INJECT 8 UNITS SUBCUTANEOUS with lunch 9 mL 0     insulin pen needle (NOVOFINE) 30G X 8 MM USE 4 PENS NEEDLES DAILY OR AS DIRECTED 100 each 6     ketoconazole (NIZORAL) 2 % cream Apply  "topically 2 times daily as needed       levETIRAcetam (KEPPRA) 1000 MG tablet TAKE (1) TABLET BY MOUTH TWICE A DAY. 60 tablet 0     metFORMIN (GLUCOPHAGE) 1000 MG tablet TAKE 1 TABLET BY MOUTH ONCE DAILY WITH A MEAL 90 tablet 1     miconazole (MICATIN; MICRO GUARD) 2 % powder Apply topically 2 times daily as needed       order for DME Equipment being ordered: Oxygen concentrator and supplies and portable tank for travel. 1 each 1     order for DME Equipment being ordered: wheeled walker 1 each 1     order for DME 4x4 bulk SOFT gauze (disp 1 sleeve, refill x 5) 1 Units 0     order for DME Equipment being ordered: Meplilex Transfer 6 X 8 inch (388780) - disp 3;  Aquacel AG 4 X 5 ([id number]); disp 3 - Refills 6 1 each 6     order for DME Equipment being ordered: Oxygen 2 LPM per nasal cannula during the day, portable also 1 each 11     order for DME Equipment being ordered: Wheelchair 1 each 0     oyster shell calcium (OS-MARILIN 500 MG [place] [place]) 500 MG tablet Take 1 tablet (500 mg) by mouth 2 times daily 180 tablet 2     polyethylene glycol (MIRALAX/GLYCOLAX) powder TAKE 17GMS DAILY. MIX IN 8OZ OF WATER, JUICE,COFFEE OR TEA BEFORE TAKING 527 g 3     sertraline (ZOLOFT) 100 MG tablet TAKE 1 TABLET BY MOUTH ONCE DAILY 90 tablet 1     Silver-Carboxymethylcellulose (AQUACEL-AG EXTRA HYDROFIBER) 4\"X5\" PADS Externally apply 0.25 each topically every other day Use as directed by wound center 5 each 3     simvastatin (ZOCOR) 10 MG tablet TAKE 1 TABLET BY MOUTH NIGHTLY AT BEDTIME 90 tablet 2     sulfamethoxazole-trimethoprim (BACTRIM DS/SEPTRA DS) 800-160 MG per tablet Take 1 tablet by mouth 2 times daily 60 tablet 3     tamsulosin (FLOMAX) 0.4 MG capsule TAKE 2 CAPSULES BY MOUTH DAILY 90 capsule 3     terazosin (HYTRIN) 10 MG capsule TAKE 1 CAPSULE BY MOUTH NIGHTLY AT BEDTIME 90 capsule 2     traMADol (ULTRAM) 50 MG tablet TAKE 1 TABLET BY MOUTH EVERY 8 HOURS AS NEEDED FOR MODERATE PAIN 90 tablet 0     ULTICARE SHORT 31G X 8 MM " insulin pen needle USE 4 TIMES DAILY OR AS DIRECTED 200 each 11     UNABLE TO FIND CPAP       vitamin E (TOCOPHEROL) 100 UNIT capsule Take 100 Units by mouth daily Uncertain of dose       Allergies   Allergen Reactions     Hydrocodone      Intolerance       Penicillins Swelling       ROS:  Constitutional, HEENT, cardiovascular, pulmonary, gi and gu systems are negative, except as otherwise noted.    OBJECTIVE:                                                    /80 (BP Location: Right arm, Patient Position: Sitting, Cuff Size: Adult Large)   Pulse 76   Temp 96.8  F (36  C) (Tympanic)   SpO2 98%   There is no height or weight on file to calculate BMI.  GENERAL APPEARANCE: Alert, no acute distress  CV: regular rate and rhythm, no murmur, rub or gallop  RESP: lungs clear to auscultation bilaterally  ABDOMEN: normal bowel sounds, soft, nontender, no hepatosplenomegaly or other masses  SKIN: no suspicious lesions or rashes to visualized skin  NEURO: Alert, oriented x 3, speech and mentation normal      Labs pending.       ASSESSMENT/PLAN:                                                    1. Type 2 diabetes mellitus with diabetic nephropathy, with long-term current use of insulin (H)  Doing well.  Update labs and follow.     2. Essential hypertension with goal blood pressure less than 140/90  Stable.     3. Chronic pain syndrome  Stable.  Using the ultram no problem.  No side effects.      4. Morbid (severe) obesity due to excess calories (H)  Ongoing.  He keeps working on it.      5. Chronic prostatitis  Discussed at length.  Nice discussion.  He really wants to be on this as it helps dramatically.  Given the sx, I think chronic prostatitis is fairly likely.  We are going to treat for 6 months after discussing risk/benefit at length.  PCA here today as well and understands.            Moreno Desai MD  [facility]

## 2019-02-04 NOTE — NURSING NOTE
"Chief Complaint   Patient presents with     Diabetes       Initial /80 (BP Location: Right arm, Patient Position: Sitting, Cuff Size: Adult Large)   Pulse 76   Temp 96.8  F (36  C) (Tympanic)   SpO2 98%  Estimated body mass index is 52.37 kg/m  as calculated from the following:    Height as of 11/28/18: 1.778 m (5' 10\").    Weight as of 11/28/18: 165.6 kg (365 lb).  Medication Reconciliation: complete    Evelia Galicia LPN  "

## 2019-02-18 NOTE — TELEPHONE ENCOUNTER
zoloft       Last Written Prescription Date:  6/5/18  Last Fill Quantity: 90,   # refills: 1  Last Office Visit: 2/4/19  Future Office visit:       keppra       Last Written Prescription Date:  1/9/19  Last Fill Quantity: 60,   # refills: 0  Last Office Visit: 2/4/19  Future Office visit:

## 2019-03-06 NOTE — TELEPHONE ENCOUNTER
NOVOLOG FLEXPEN SYRINGE      Last Written Prescription Date:  9/6/18  Last Fill Quantity: 9 mL,   # refills: 0  Last Office Visit: 2/4/19  Future Office visit:       TAMSULOSIN HCL 0.4 MG CAPSULE      Last Written Prescription Date:  8/17/18  Last Fill Quantity: 90,   # refills: 3  Last Office Visit: 2/4/19  Future Office visit:

## 2019-03-19 NOTE — TELEPHONE ENCOUNTER
zocor       Last Written Prescription Date:  7/11/18  Last Fill Quantity:90,   # refills: 2  Last Office Visit: 2/4/19  Future Office visit:

## 2019-03-19 NOTE — TELEPHONE ENCOUNTER
Keppra  Last Written Prescription Date: 2/19/19  Last Fill Quantity: 60 # of Refills: 0  Last Office Visit: 2/4/19    Metformin  Last Written Prescription Date: 6/26/18  Last Fill Quantity: 90 # of Refills: 1  Last Office Visit: 2/4/19

## 2019-04-03 NOTE — TELEPHONE ENCOUNTER
Captopril - patient reported medication.  Pharmacy is requesting 100 mg TID and med list has 50 TID.    Last visit: 2.4.19    Future appointments:

## 2019-04-17 NOTE — TELEPHONE ENCOUNTER
Gout Agents Protocol Failed   allopurinol (ZYLOPRIM) 300 MG tablet [Pharmacy Med Name: ALLOPURINOL 300 MG TABLET]   Rerun Protocol (4/16/2019 8:10 AM)      Has Uric Acid on file in past 12 months and value is less than 6          Recent Labs   Lab Test 10/18/17  0933   URIC 5.2   If level is 6mg/dL or greater, ok to refill one time and refer to provider.          Normal serum creatinine on file in the past 12 months          Recent Labs   Lab Test 02/04/19  1032   CR 1.65*           pcp to advise on allopurinol refill, due for uric acid.

## 2019-05-30 NOTE — TELEPHONE ENCOUNTER
capoten  Last Written Prescription Date: 4/3/19  Last Fill Quantity: 90 # of Refills: 0  Last Office Visit: 2/4/19    zoloft  Last Written Prescription Date: 2/19/19  Last Fill Quantity: 90 # of Refills: 0  Last Office Visit: 2/4/19    terazosin  Last Written Prescription Date: 6/5/19  Last Fill Quantity: 90 # of Refills: 2  Last Office Visit: 2/4/19

## 2019-05-30 NOTE — TELEPHONE ENCOUNTER
Per protocol due for PHQ-9 route to  nurse pool, please call patient. Thank you    captopril      Last Written Prescription Date:  7/3/19  Last Fill Quantity: 90,   # refills: 0  Last Office Visit: 2/4/19  Future Office visit:       Routing refill request to provider for review/approval because:  pcp to advise

## 2021-10-01 PROBLEM — M17.10 UNILATERAL PRIMARY OSTEOARTHRITIS, UNSPECIFIED KNEE: Status: ACTIVE | Noted: 2017-08-04

## 2022-02-17 PROBLEM — F11.90 CHRONIC, CONTINUOUS USE OF OPIOIDS: Status: ACTIVE | Noted: 2017-12-19

## 2024-03-04 NOTE — PLAN OF CARE
From: Javier Leiva  To: Paula Hemphill  Sent: 3/3/2024 8:57 AM CST  Subject: Legs puffy    Good morning Dr. Hemphill, I have noticed that my legs are starting to retain water after stopping my water pill. My wife and daughter suggested that I start taking the water pill again or every other day. What are your thoughts on it or is there a different medication? Thanks   Nurse to nurse report given to Esperanza Huffman regarding possible transfer to their facility.